# Patient Record
Sex: FEMALE | Race: WHITE | NOT HISPANIC OR LATINO | URBAN - METROPOLITAN AREA
[De-identification: names, ages, dates, MRNs, and addresses within clinical notes are randomized per-mention and may not be internally consistent; named-entity substitution may affect disease eponyms.]

---

## 2023-10-31 ENCOUNTER — TRANSCRIBE ORDERS (OUTPATIENT)
Dept: PHYSICAL THERAPY | Facility: HOSPITAL | Age: 58
End: 2023-10-31

## 2023-10-31 DIAGNOSIS — C50.912 MALIGNANT NEOPLASM OF LEFT FEMALE BREAST, UNSPECIFIED ESTROGEN RECEPTOR STATUS, UNSPECIFIED SITE OF BREAST: Primary | ICD-10-CM

## 2023-11-01 ENCOUNTER — PATIENT OUTREACH (OUTPATIENT)
Dept: OTHER | Facility: HOSPITAL | Age: 58
End: 2023-11-01
Payer: COMMERCIAL

## 2023-11-01 ENCOUNTER — CONSULT (OUTPATIENT)
Dept: ONCOLOGY | Facility: CLINIC | Age: 58
End: 2023-11-01
Payer: COMMERCIAL

## 2023-11-01 VITALS
HEIGHT: 65 IN | WEIGHT: 127 LBS | SYSTOLIC BLOOD PRESSURE: 133 MMHG | TEMPERATURE: 97.5 F | OXYGEN SATURATION: 98 % | RESPIRATION RATE: 18 BRPM | HEART RATE: 98 BPM | BODY MASS INDEX: 21.16 KG/M2 | DIASTOLIC BLOOD PRESSURE: 76 MMHG

## 2023-11-01 DIAGNOSIS — C50.912 MALIGNANT NEOPLASM OF LEFT BREAST IN FEMALE, ESTROGEN RECEPTOR NEGATIVE, UNSPECIFIED SITE OF BREAST: Primary | ICD-10-CM

## 2023-11-01 DIAGNOSIS — I42.7 CARDIOMYOPATHY DUE TO CHEMOTHERAPY: Primary | ICD-10-CM

## 2023-11-01 DIAGNOSIS — C50.912 MALIGNANT NEOPLASM OF LEFT BREAST IN FEMALE, ESTROGEN RECEPTOR NEGATIVE, UNSPECIFIED SITE OF BREAST: ICD-10-CM

## 2023-11-01 DIAGNOSIS — Z17.1 MALIGNANT NEOPLASM OF LEFT BREAST IN FEMALE, ESTROGEN RECEPTOR NEGATIVE, UNSPECIFIED SITE OF BREAST: ICD-10-CM

## 2023-11-01 DIAGNOSIS — T45.1X5A CARDIOMYOPATHY DUE TO CHEMOTHERAPY: Primary | ICD-10-CM

## 2023-11-01 DIAGNOSIS — Z17.1 MALIGNANT NEOPLASM OF LEFT BREAST IN FEMALE, ESTROGEN RECEPTOR NEGATIVE, UNSPECIFIED SITE OF BREAST: Primary | ICD-10-CM

## 2023-11-01 PROBLEM — C50.812 MALIGNANT NEOPLASM OF OVERLAPPING SITES OF LEFT BREAST IN FEMALE, ESTROGEN RECEPTOR NEGATIVE: Status: ACTIVE | Noted: 2023-11-01

## 2023-11-01 NOTE — PROGRESS NOTES
"  Subjective     PROBLEM LIST:  [id number] triple negative (her2 0+) invasive ductal carcinoma of the left breast  Biopsy of a 1.2 cm left breast mass on 10/4/23.  Pathology showed a high grade IDC.    CHIEF COMPLAINT: breast cancer    HISTORY OF PRESENT ILLNESS:  The patient is a 58 y.o. female, referred for evaluation of a recently diagnosed breast cancer.    She presented with a palpable mass in the left breast that she initially thought was a pulled muscle.  She had had a screening mammogram done in July of this year.    She is otherwise in good health.  She lives with her  in [facility].  She works as a school counselor.    REVIEW OF SYSTEMS:  A 14 point review of systems was performed and is negative except as noted above.    No past medical history on file.        Objective     /76   Pulse 98   Temp 97.5 °F (36.4 °C) (Temporal)   Resp 18   Ht 165.1 cm (65\")   Wt 57.6 kg (127 lb)   SpO2 98%   BMI 21.13 kg/m²   Performance Status:0              General: well appearing female in no acute distress  Neuro: alert and oriented  HEENT: sclerae anicteric, oropharynx clear  Lymphatics: no cervical, supraclavicular, or axillary adenopathy  Breast: In the left breast at 3:00 near the chest wall there is a approximately 1 cm palpable movable mass  Extremities: no lower extremity edema  Skin: no rashes, lesions, bruising, or petechiae  Psych: mood and affect appropriate    No results found for: \"WBC\", \"HGB\", \"HCT\", \"MCV\", \"PLT\"  No results found for: \"GLUCOSE\", \"BUN\", \"CREATININE\", \"EGFRIFNONA\", \"EGFRIFAFRI\", \"BCR\", \"K\", \"CO2\", \"CALCIUM\", \"PROTENTOTREF\", \"ALBUMIN\", \"LABIL2\", \"BILIRUBIN\", \"AST\", \"ALT\"    No image results found.            ASSESSMENT AND PLAN:     Swathi Cain is a 58 y.o. female with a clinical stage T1 cN0 M0 triple negative high-grade invasive ductal carcinoma of the left breast.    We discussed neoadjuvant chemotherapy with dose dense Adriamycin and Cytoxan followed by Taxol.   We " reviewed the side effects of this regimen including nausea, fatigue, myelosuppression, infusion reaction, cardiotoxicity, myelodysplasia, neuropathy, alopecia, and constipation or diarrhea.    We discussed that based on the pathologic findings at the time of surgery, that if she still has residual disease we can consider additional treatment such as Xeloda or potentially a clinical trial.    I will order a baseline echocardiogram.    We discussed the clinical trial that is looking at cold compression for the addition of neuropathy.  Our research coordinators will contact her about this.    We will try to start treatment in the next week or 2.    Follow-up with me with cycle 2.             A total greater than 60 mins minutes was spent in face to face patient time, examination, counseling, charting, reviewing test results, and reviewing outside records.    Marga Arreola MD    11/1/2023

## 2023-11-01 NOTE — SIGNIFICANT NOTE
Met patient and her  in consultation with Dr. SMITH. Dr. SMITH reviewed pathology of left breast IG IDC clinical stage IA TNBC and treatment options including chemotherapy before or after surgery. Pt has consult with Dr. Arreola today. MRI ordered. Referral to genetics placed. NN provided education materials and contact info, and encouraged pt to call with any questions. Pt verbalized being overwhelmed with all of the info she received today.     11/01/23 4455   Nurse Navigation   Type of Visit New patient   Location of Visit Cancer Center   Visit Diagnosis Breast - malignant   Referral Source Health professional - outpatient   Treatments Surgery;Chemotherapy   Date of Diagnosis 10/04/23   Chemo - First Consult Appointment 11/01/23   Surgery - First Consult Appointment 11/01/23   Barriers to Care Emotional   Practical Needs Nurse Navigator Referral   Emotional Needs emotional suppport/coping strategies   Physical Needs wigs/caps/cooling cap   Intervention Tasks Performed Education;Symptom management;Cancer prevention/Screening;Outpatient appt;Supportive services referral   Supportive services referral Bioimpedance/Lymphedema;Social Work referral;Genetics referral   Time Navigated Today (Min) 55   Total Time Navigated (Min) 55   Acuity Rating   Time spent with patient 3- greater than 45 minutes   Multimodality treatment coordination and education 3- Complex issues - receiving multiple modalities or concurrent care (chemo, rad, surgery, hospitalization within 30 days)   Caregiver support 1- Family/significant other support available   Distress score 3- 8-10   Coordination of care  - appts 2- Multiple appts   Appt compliance 1- Compliant   ECOG/Karnofsky Score 1- ECOG -Less than or equal to 1,  Karnofsky 90 or greater   PHQ 9 score  1- <10 clinical   Referrals to support services 3- 2 or greater   Acuity score 18   Acuity level Medium acuity

## 2023-11-03 ENCOUNTER — TRANSCRIBE ORDERS (OUTPATIENT)
Dept: GENERAL RADIOLOGY | Facility: HOSPITAL | Age: 58
End: 2023-11-03
Payer: COMMERCIAL

## 2023-11-03 DIAGNOSIS — C50.412 MALIGNANT NEOPLASM OF UPPER-OUTER QUADRANT OF LEFT FEMALE BREAST, UNSPECIFIED ESTROGEN RECEPTOR STATUS: Primary | ICD-10-CM

## 2023-11-06 ENCOUNTER — HOSPITAL ENCOUNTER (OUTPATIENT)
Dept: GENERAL RADIOLOGY | Facility: HOSPITAL | Age: 58
Discharge: HOME | End: 2023-11-06

## 2023-11-06 DIAGNOSIS — C50.412 MALIGNANT NEOPLASM OF UPPER-OUTER QUADRANT OF LEFT FEMALE BREAST, UNSPECIFIED ESTROGEN RECEPTOR STATUS: ICD-10-CM

## 2023-11-06 DIAGNOSIS — Z17.1 MALIGNANT NEOPLASM OF OVERLAPPING SITES OF LEFT BREAST IN FEMALE, ESTROGEN RECEPTOR NEGATIVE: Primary | ICD-10-CM

## 2023-11-06 DIAGNOSIS — C50.812 MALIGNANT NEOPLASM OF OVERLAPPING SITES OF LEFT BREAST IN FEMALE, ESTROGEN RECEPTOR NEGATIVE: Primary | ICD-10-CM

## 2023-11-06 PROCEDURE — 71045 X-RAY EXAM CHEST 1 VIEW: CPT

## 2023-11-06 RX ORDER — CETIRIZINE HYDROCHLORIDE 10 MG/1
10 TABLET ORAL DAILY
COMMUNITY

## 2023-11-06 RX ORDER — TRAZODONE HYDROCHLORIDE 50 MG/1
50 TABLET ORAL NIGHTLY
COMMUNITY

## 2023-11-06 RX ORDER — MULTIPLE VITAMINS W/ MINERALS TAB 9MG-400MCG
1 TAB ORAL DAILY
COMMUNITY

## 2023-11-06 RX ORDER — CHOLECALCIFEROL (VITAMIN D3) 125 MCG
500 CAPSULE ORAL DAILY
COMMUNITY

## 2023-11-06 RX ORDER — ROSUVASTATIN CALCIUM 5 MG/1
5 TABLET, COATED ORAL DAILY
COMMUNITY

## 2023-11-07 ENCOUNTER — RESEARCH ENCOUNTER (OUTPATIENT)
Dept: OTHER | Facility: OTHER | Age: 58
End: 2023-11-07
Payer: COMMERCIAL

## 2023-11-07 ENCOUNTER — EDUCATION (OUTPATIENT)
Dept: ONCOLOGY | Facility: HOSPITAL | Age: 58
End: 2023-11-07
Payer: COMMERCIAL

## 2023-11-07 ENCOUNTER — OFFICE VISIT (OUTPATIENT)
Dept: ONCOLOGY | Facility: CLINIC | Age: 58
End: 2023-11-07
Payer: COMMERCIAL

## 2023-11-07 ENCOUNTER — HOSPITAL ENCOUNTER (OUTPATIENT)
Dept: CARDIOLOGY | Facility: HOSPITAL | Age: 58
Discharge: HOME | End: 2023-11-07
Payer: COMMERCIAL

## 2023-11-07 ENCOUNTER — HOSPITAL ENCOUNTER (OUTPATIENT)
Dept: ONCOLOGY | Facility: HOSPITAL | Age: 58
Discharge: HOME | End: 2023-11-07
Payer: COMMERCIAL

## 2023-11-07 VITALS — HEIGHT: 65 IN | BODY MASS INDEX: 21.16 KG/M2 | WEIGHT: 126.98 LBS

## 2023-11-07 VITALS
WEIGHT: 127 LBS | HEIGHT: 65 IN | SYSTOLIC BLOOD PRESSURE: 155 MMHG | BODY MASS INDEX: 21.16 KG/M2 | TEMPERATURE: 97.9 F | RESPIRATION RATE: 16 BRPM | DIASTOLIC BLOOD PRESSURE: 68 MMHG | OXYGEN SATURATION: 99 % | HEART RATE: 98 BPM

## 2023-11-07 DIAGNOSIS — Z17.1 MALIGNANT NEOPLASM OF OVERLAPPING SITES OF LEFT BREAST IN FEMALE, ESTROGEN RECEPTOR NEGATIVE: ICD-10-CM

## 2023-11-07 DIAGNOSIS — C50.812 MALIGNANT NEOPLASM OF OVERLAPPING SITES OF LEFT BREAST IN FEMALE, ESTROGEN RECEPTOR NEGATIVE: Primary | ICD-10-CM

## 2023-11-07 DIAGNOSIS — Z17.1 MALIGNANT NEOPLASM OF OVERLAPPING SITES OF LEFT BREAST IN FEMALE, ESTROGEN RECEPTOR NEGATIVE: Primary | ICD-10-CM

## 2023-11-07 DIAGNOSIS — L65.8 ALOPECIA DUE TO CYTOTOXIC DRUG: ICD-10-CM

## 2023-11-07 DIAGNOSIS — I42.7 CARDIOMYOPATHY DUE TO CHEMOTHERAPY: ICD-10-CM

## 2023-11-07 DIAGNOSIS — C50.812 MALIGNANT NEOPLASM OF OVERLAPPING SITES OF LEFT BREAST IN FEMALE, ESTROGEN RECEPTOR NEGATIVE: ICD-10-CM

## 2023-11-07 DIAGNOSIS — T45.1X5A ALOPECIA DUE TO CYTOTOXIC DRUG: ICD-10-CM

## 2023-11-07 DIAGNOSIS — T45.1X5A CARDIOMYOPATHY DUE TO CHEMOTHERAPY: ICD-10-CM

## 2023-11-07 LAB
ALBUMIN SERPL-MCNC: 4.5 G/DL (ref 3.5–5.2)
ALBUMIN/GLOB SERPL: 2.3 G/DL
ALP SERPL-CCNC: 74 U/L (ref 39–117)
ALT SERPL W P-5'-P-CCNC: 12 U/L (ref 1–33)
ANION GAP SERPL CALCULATED.3IONS-SCNC: 7 MMOL/L (ref 5–15)
AST SERPL-CCNC: 17 U/L (ref 1–32)
BASOPHILS # BLD AUTO: 0.02 10*3/MM3 (ref 0–0.2)
BASOPHILS NFR BLD AUTO: 0.2 % (ref 0–1.5)
BH CV ECHO LEFT VENTRICLE GLOBAL LONGITUDINAL STRAIN: -20.2 %
BH CV ECHO MEAS - AO MAX PG: 7.1 MMHG
BH CV ECHO MEAS - AO MEAN PG: 4 MMHG
BH CV ECHO MEAS - AO ROOT DIAM: 3 CM
BH CV ECHO MEAS - AO V2 MAX: 133 CM/SEC
BH CV ECHO MEAS - AO V2 VTI: 25.1 CM
BH CV ECHO MEAS - AVA(I,D): 2.13 CM2
BH CV ECHO MEAS - EDV(CUBED): 64 ML
BH CV ECHO MEAS - EDV(MOD-SP2): 54 ML
BH CV ECHO MEAS - EDV(MOD-SP4): 74.6 ML
BH CV ECHO MEAS - EF(MOD-BP): 63.5 %
BH CV ECHO MEAS - EF(MOD-SP2): 61.9 %
BH CV ECHO MEAS - EF(MOD-SP4): 66.6 %
BH CV ECHO MEAS - ESV(CUBED): 13.8 ML
BH CV ECHO MEAS - ESV(MOD-SP2): 20.6 ML
BH CV ECHO MEAS - ESV(MOD-SP4): 24.9 ML
BH CV ECHO MEAS - FS: 40 %
BH CV ECHO MEAS - IVS/LVPW: 0.75 CM
BH CV ECHO MEAS - IVSD: 0.6 CM
BH CV ECHO MEAS - LA DIMENSION: 2.8 CM
BH CV ECHO MEAS - LAT PEAK E' VEL: 12.1 CM/SEC
BH CV ECHO MEAS - LV DIASTOLIC VOL/BSA (35-75): 45.7 CM2
BH CV ECHO MEAS - LV MASS(C)D: 78.4 GRAMS
BH CV ECHO MEAS - LV MAX PG: 3.9 MMHG
BH CV ECHO MEAS - LV MEAN PG: 2 MMHG
BH CV ECHO MEAS - LV SYSTOLIC VOL/BSA (12-30): 15.3 CM2
BH CV ECHO MEAS - LV V1 MAX: 99.2 CM/SEC
BH CV ECHO MEAS - LV V1 VTI: 17 CM
BH CV ECHO MEAS - LVIDD: 4 CM
BH CV ECHO MEAS - LVIDS: 2.4 CM
BH CV ECHO MEAS - LVOT AREA: 3.1 CM2
BH CV ECHO MEAS - LVOT DIAM: 2 CM
BH CV ECHO MEAS - LVPWD: 0.8 CM
BH CV ECHO MEAS - MED PEAK E' VEL: 13.4 CM/SEC
BH CV ECHO MEAS - MV A MAX VEL: 108 CM/SEC
BH CV ECHO MEAS - MV DEC SLOPE: 746 CM/SEC2
BH CV ECHO MEAS - MV DEC TIME: 0.17 SEC
BH CV ECHO MEAS - MV E MAX VEL: 93.4 CM/SEC
BH CV ECHO MEAS - MV E/A: 0.86
BH CV ECHO MEAS - MV MAX PG: 6.1 MMHG
BH CV ECHO MEAS - MV MEAN PG: 3 MMHG
BH CV ECHO MEAS - MV P1/2T: 49.1 MSEC
BH CV ECHO MEAS - MV V2 VTI: 24 CM
BH CV ECHO MEAS - MVA(P1/2T): 4.5 CM2
BH CV ECHO MEAS - MVA(VTI): 2.23 CM2
BH CV ECHO MEAS - PA ACC TIME: 0.12 SEC
BH CV ECHO MEAS - PA V2 MAX: 97.5 CM/SEC
BH CV ECHO MEAS - RAP SYSTOLE: 8 MMHG
BH CV ECHO MEAS - RVSP: 36 MMHG
BH CV ECHO MEAS - SI(MOD-SP2): 20.5 ML/M2
BH CV ECHO MEAS - SI(MOD-SP4): 30.5 ML/M2
BH CV ECHO MEAS - SV(LVOT): 53.4 ML
BH CV ECHO MEAS - SV(MOD-SP2): 33.4 ML
BH CV ECHO MEAS - SV(MOD-SP4): 49.7 ML
BH CV ECHO MEAS - TAPSE (>1.6): 2.21 CM
BH CV ECHO MEAS - TR MAX PG: 28.3 MMHG
BH CV ECHO MEAS - TR MAX VEL: 266 CM/SEC
BH CV ECHO MEASUREMENTS AVERAGE E/E' RATIO: 7.33
BH CV XLRA - RV BASE: 2.7 CM
BH CV XLRA - RV LENGTH: 4.9 CM
BH CV XLRA - RV MID: 2.2 CM
BH CV XLRA - TDI S': 18.6 CM/SEC
BILIRUB SERPL-MCNC: 0.3 MG/DL (ref 0–1.2)
BUN SERPL-MCNC: 13 MG/DL (ref 6–20)
BUN/CREAT SERPL: 19.7 (ref 7–25)
CALCIUM SPEC-SCNC: 9.1 MG/DL (ref 8.6–10.5)
CHLORIDE SERPL-SCNC: 108 MMOL/L (ref 98–107)
CO2 SERPL-SCNC: 26 MMOL/L (ref 22–29)
CREAT SERPL-MCNC: 0.66 MG/DL (ref 0.57–1)
DEPRECATED RDW RBC AUTO: 46.9 FL (ref 37–54)
EGFRCR SERPLBLD CKD-EPI 2021: 103.1 ML/MIN/1.73
EOSINOPHIL # BLD AUTO: 0.03 10*3/MM3 (ref 0–0.4)
EOSINOPHIL NFR BLD AUTO: 0.4 % (ref 0.3–6.2)
ERYTHROCYTE [DISTWIDTH] IN BLOOD BY AUTOMATED COUNT: 12.4 % (ref 12.3–15.4)
GLOBULIN UR ELPH-MCNC: 2 GM/DL
GLUCOSE SERPL-MCNC: 97 MG/DL (ref 65–99)
HCT VFR BLD AUTO: 40.5 % (ref 34–46.6)
HGB BLD-MCNC: 13.5 G/DL (ref 12–15.9)
IMM GRANULOCYTES # BLD AUTO: 0.01 10*3/MM3 (ref 0–0.05)
IMM GRANULOCYTES NFR BLD AUTO: 0.1 % (ref 0–0.5)
LEFT ATRIUM VOLUME INDEX: 16.3 ML/M2
LYMPHOCYTES # BLD AUTO: 2.76 10*3/MM3 (ref 0.7–3.1)
LYMPHOCYTES NFR BLD AUTO: 33.1 % (ref 19.6–45.3)
MCH RBC QN AUTO: 33.6 PG (ref 26.6–33)
MCHC RBC AUTO-ENTMCNC: 33.3 G/DL (ref 31.5–35.7)
MCV RBC AUTO: 100.7 FL (ref 79–97)
MONOCYTES # BLD AUTO: 0.53 10*3/MM3 (ref 0.1–0.9)
MONOCYTES NFR BLD AUTO: 6.3 % (ref 5–12)
NEUTROPHILS NFR BLD AUTO: 5 10*3/MM3 (ref 1.7–7)
NEUTROPHILS NFR BLD AUTO: 59.9 % (ref 42.7–76)
PLATELET # BLD AUTO: 189 10*3/MM3 (ref 140–450)
PMV BLD AUTO: 9 FL (ref 6–12)
POTASSIUM SERPL-SCNC: 3.4 MMOL/L (ref 3.5–5.2)
PROT SERPL-MCNC: 6.5 G/DL (ref 6–8.5)
RBC # BLD AUTO: 4.02 10*6/MM3 (ref 3.77–5.28)
SODIUM SERPL-SCNC: 141 MMOL/L (ref 136–145)
WBC NRBC COR # BLD: 8.35 10*3/MM3 (ref 3.4–10.8)

## 2023-11-07 PROCEDURE — 93356 MYOCRD STRAIN IMG SPCKL TRCK: CPT | Performed by: INTERNAL MEDICINE

## 2023-11-07 PROCEDURE — 36591 DRAW BLOOD OFF VENOUS DEVICE: CPT

## 2023-11-07 PROCEDURE — 80053 COMPREHEN METABOLIC PANEL: CPT | Performed by: INTERNAL MEDICINE

## 2023-11-07 PROCEDURE — 93306 TTE W/DOPPLER COMPLETE: CPT

## 2023-11-07 PROCEDURE — 93356 MYOCRD STRAIN IMG SPCKL TRCK: CPT

## 2023-11-07 PROCEDURE — 93306 TTE W/DOPPLER COMPLETE: CPT | Performed by: INTERNAL MEDICINE

## 2023-11-07 PROCEDURE — 85025 COMPLETE CBC W/AUTO DIFF WBC: CPT | Performed by: INTERNAL MEDICINE

## 2023-11-07 PROCEDURE — 25010000002 HEPARIN LOCK FLUSH PER 10 UNITS: Performed by: INTERNAL MEDICINE

## 2023-11-07 RX ORDER — PALONOSETRON 0.05 MG/ML
0.25 INJECTION, SOLUTION INTRAVENOUS ONCE
Status: CANCELLED | OUTPATIENT
Start: 2023-11-09

## 2023-11-07 RX ORDER — DOXORUBICIN HYDROCHLORIDE 2 MG/ML
60 INJECTION, SOLUTION INTRAVENOUS ONCE
Status: CANCELLED | OUTPATIENT
Start: 2023-11-09

## 2023-11-07 RX ORDER — SODIUM CHLORIDE 9 MG/ML
20 INJECTION, SOLUTION INTRAVENOUS ONCE
Status: CANCELLED | OUTPATIENT
Start: 2023-11-09

## 2023-11-07 RX ORDER — SODIUM CHLORIDE 0.9 % (FLUSH) 0.9 %
10 SYRINGE (ML) INJECTION AS NEEDED
Status: DISCONTINUED | OUTPATIENT
Start: 2023-11-07 | End: 2023-11-08 | Stop reason: HOSPADM

## 2023-11-07 RX ORDER — HEPARIN SODIUM (PORCINE) LOCK FLUSH IV SOLN 100 UNIT/ML 100 UNIT/ML
500 SOLUTION INTRAVENOUS AS NEEDED
Status: CANCELLED | OUTPATIENT
Start: 2023-11-07

## 2023-11-07 RX ORDER — LIDOCAINE AND PRILOCAINE 25; 25 MG/G; MG/G
1 CREAM TOPICAL AS NEEDED
Qty: 30 G | Refills: 3 | Status: SHIPPED | OUTPATIENT
Start: 2023-11-07

## 2023-11-07 RX ORDER — SODIUM CHLORIDE 0.9 % (FLUSH) 0.9 %
10 SYRINGE (ML) INJECTION AS NEEDED
Status: CANCELLED | OUTPATIENT
Start: 2023-11-07

## 2023-11-07 RX ORDER — OLANZAPINE 5 MG/1
5 TABLET ORAL NIGHTLY
Qty: 16 TABLET | Refills: 0 | Status: SHIPPED | OUTPATIENT
Start: 2023-11-07

## 2023-11-07 RX ORDER — ONDANSETRON HYDROCHLORIDE 8 MG/1
8 TABLET, FILM COATED ORAL EVERY 8 HOURS PRN
Qty: 30 TABLET | Refills: 3 | Status: SHIPPED | OUTPATIENT
Start: 2023-11-07

## 2023-11-07 RX ORDER — ONDANSETRON HYDROCHLORIDE 8 MG/1
8 TABLET, FILM COATED ORAL 3 TIMES DAILY PRN
Qty: 30 TABLET | Refills: 5 | Status: SHIPPED | OUTPATIENT
Start: 2023-11-07 | End: 2023-11-07 | Stop reason: SDUPTHER

## 2023-11-07 RX ORDER — OLANZAPINE 5 MG/1
5 TABLET ORAL NIGHTLY
Qty: 4 TABLET | Refills: 3 | Status: SHIPPED | OUTPATIENT
Start: 2023-11-07 | End: 2023-11-07 | Stop reason: SDUPTHER

## 2023-11-07 RX ORDER — HEPARIN SODIUM (PORCINE) LOCK FLUSH IV SOLN 100 UNIT/ML 100 UNIT/ML
500 SOLUTION INTRAVENOUS AS NEEDED
Status: DISCONTINUED | OUTPATIENT
Start: 2023-11-07 | End: 2023-11-08 | Stop reason: HOSPADM

## 2023-11-07 RX ADMIN — HEPARIN 500 UNITS: 100 SYRINGE at 12:20

## 2023-11-07 RX ADMIN — Medication 10 ML: at 12:20

## 2023-11-07 NOTE — PLAN OF CARE
[facility]  [address] [place] [address]  [phone]      CHEMOTHERAPY EDUCATION    NAME:  Swathi Cain      : 1965           DATE: 23    Medication Education Sheets: (select all that apply)  Cyclophosphamide, Doxorubicin, and Paclitaxel + Neulasta OnPro    Other Education Sheets: (select all that apply)  CINV, Diarrhea, Neulasta OnPro Patient Guide, and Symptom Tracker Sheet and KANWAL Information    Chemotherapy Regimen:   Part 1:   OP Breast DD AC DOXOrubicin / Cyclophosphamide  IV on day 1 of every 14-day cycle for 4 cycles  Pegfilgrastim SQ on day 2 of every 14-day cycle for 4 cycles    --followed by--    Part 2:  Paclitaxel IV on day 1 of every 7-day cycle for 12 cycles    TOPICS EDUCATION PROVIDED COMMENTS   ANEMIA:  role of RBC, cause, s/s, ways to manage, role of transfusion [x] Reviewed the role of RBC and the use of transfusions if hemoglobin decreases too much.  Patient to notify us if she experiences shortness of breath, dizziness, or palpitations.  Also let patient know she could feel more tired than usual and to try to stay active, but rest if she needs to.    THROMBOCYTOPENIA:  role of platelet, cause, s/s, ways to prevent bleeding, things to avoid, when to seek help [x] Reviewed the role of platelets in blood clotting and when to call clinic (bloody nose that bleeds for 5 minutes despite pressure, a cut that won't stop bleeding despite pressure, gums that bleed excessively with brushing or flossing). Recommended using a soft bristle toothbrush and blowing the nose gently.    NEUTROPENIA:  role of WBC, cause, infection precautions, s/s of infection, when to call MD [x] Reviewed the role of WBC, good infection prevention practices, and when to call the clinic (temperature 100.4F, sore throat, burning urination, etc)  COVID Vaccines:  COVID vaccine received  Flu Vaccine:  flu vaccine received   NUTRITION & APPETITE CHANGES:  importance  of maintaining healthy diet & weight, ways to manage to improve intake, dietary consult, exercise regimen, electrolyte and/or blood glucose abnormalities [x] Decreased Appetite: Discussed the risk of decreased appetite. Recommended eating smaller, more frequent meals. Instructed the patient to contact the clinic if losing weight or having difficulty eating enough to maintain energy level.   DIARRHEA:  causes, s/s of dehydration, ways to manage, dietary changes, when to call MD [x] Chemotherapy : Discussed the risk of diarrhea. Instructed patient on use OTC loperamide with diarrhea onset, but emphasized the importance of calling the clinic if 4-6 episodes in 24 hours not relieved by OTC loperamide.   NAUSEA & VOMITING:  cause, use of antiemetics, dietary changes, when to call MD [x] Emetic risk:   Part 1: High  Part 2: Low  Premeds:   Part 1: Dexamethasone, Fosaprepitant, and Palonosetron  Part 2: Dexamethasone helps to prevent nausea and infusion reaction  Scheduled home meds:  Part 1: Olanzapine 5 mg by mouth at night on days 1,2,3,4 after chemotherapy to prevent delayed nausea  Part 2: None  PRN home meds: Ondansetron 8mg PO TID PRN N/V  Pharmacy home meds sent to: [facility] in [place]    Instructed the patient to take the scheduled anti-nausea medications even if she does not feel nauseous.  I explained this is to prevent delayed nausea.    Instructed the patient to take a dose of the PRN medication at the first onset of nausea and if it's not working to call us for additional medications.  Also provided non-drug measures to mitigate nausea.   MOUTH SORES:  causes, oral care, ways to manage [x] Mouth sores can be prevented by making a mouth wash mixture of salt, baking soda, and water. The patient was instructed to swish and spit four times daily after meals and before bedtime. Also recommended using a soft bristle toothbrush and avoiding alcohol-containing OTC mouthwashes.    ALOPECIA:  cause,  ways to manage, resources [x] Discussed the possibility of hair loss with the patient. Informed patient that she could request a prescription for a wig if desired and most of the cost is usually covered by insurance. Recommended covering the head with a hat and/or protecting the skin on the head with SPF 30 or higher.    NERVOUS SYSTEM CHANGES:  causes, s/s, neuropathies, cognitive changes, ways to manage [x] Discussed the adverse effect of peripheral neuropathy and signs/symptoms associated with this adverse effect. Instructed patient to call if symptoms become more frequent or worsen.    PAIN:  causes, ways to manage [x] Discussed muscle and joint aches/pains with chemotherapy, and recommended the use of OTC pain relief with ibuprofen or acetaminophen if needed.  Growth Factors: Discussed the possibility for bone pain with pegfilgrastim, and reviewed the use of loratadine 10 mg by mouth at night on days 2-8 of each cycle to help manage this side effect.  Vesicant Pain: Instructed patient to notify the nurse of any pain at the IV site during the infusion.   EMLA Cream: Discussed rx for EMLA cream (sent to [facility] in [place]), and the benefit of use 45-60 minutes prior to access of port for lab draws / infusions.   SKIN & NAIL CHANGES:  cause, s/s, ways to manage [x] Part 2: Informed the patient of the possibility for dark or white lines on finger and toenails during treatment, and that nails may become brittle and even fall off in extreme cases. This will likely reverse after treatment concludes.    ORGAN TOXICITIES:  cause, s/s, need for diagnostic tests, labs, when to notify MD [x] Discussed potential effects on organ systems, monitoring, diagnostic tests, labs, and when to notify the clinic. Discussed the signs/symptoms of the following: cardiotoxicity (part 1) and hepatotoxicity (part 2).   INFUSION RELATED REACTIONS or INJECTION-SITE REACTION:  Cause, s/s, anaphylaxis, monitoring, etc. [x]  Premeds:   Part 1: None  Part 2: Dexamethasone, Diphenhydramine, and Famotidine    Discussed the risk of an infusion reaction and symptoms such as: fever, chills, dizziness, itchiness or rash, flushing, trouble breathing, wheezing, sudden back pain, or feeling faint.  Instructed the patient to notify her nurse if she starts feeling weird at any point during her infusion.    Reviewed how infusion reactions are managed.   SURVIVORSHIP:  distress, distress assessment, secondary malignancies, early/late effects, follow-up, social issues, social support [x] Discussed the rare, but possible risk of secondary malignancies months to years after treatment with cyclophosphamide and doxorubicin, most commonly acute myeloid leukemia.   MISCELLANEOUS:  drug interactions, administration, labs, etc. [x] Discussed chemotherapy schedule, lab draws, infusion times, and total expected visit time.   DDIs:  reviewed the increased possibility of serotonin syndrome with the combined use of Zyprexa, Trazodone, Zoloft, and Zofran. Patient advised to watch for signs of mental status changes, tremor, muscle rigidity, diaphoresis, and hyperthermia.   Drug-food interactions: eating grapefruit and drinking grapefruit juice.  Lab draws: On or before day 1 of each cycle, no sooner than 3 days early.  Reviewed the possibility for hemorrhagic cystitis and emphasized the importance of adequate hydration and frequent voiding of the bladder.  Doxorubicin Red Body Fluids: Discussed the possibility of red body fluids for about 2-3 days after doxorubicin.  Recommend the patient not wear contacts during this time because they could be stained pink.  Patient instructed to contact clinic if urine appears red / pink more than 3 days after receiving doxorubicin.   INFERTILITY & SEXUALITY:    causes, fertility preservation options, sexuality changes, ways to manage, importance of birth control [x] IV Oncology Therapy: Reviewed safe sex practices and the  importance of minimizing exposure to body fluids for 48 hours after each dose of IV oncology therapy. The patient is not of childbearing potential. LMP at age 48.   HOME CARE:  storing of oral chemo, how to manage bodily fluids [x] IV - Counseled on management of soiled linens and proper flush technique.  Discussed how to manage all the side effects at home and advised when to contact the MD office.     Medications:  Prior to Admission medications    Medication Sig Start Date End Date Taking? Authorizing Provider   cetirizine (zyrTEC) 10 MG tablet Take 1 tablet by mouth Daily.    Shani Jefferson MD   multivitamin with minerals (Hair Skin and Nails Formula) tablet tablet Take 1 tablet by mouth Daily.    Shani Jefferson MD   multivitamin with minerals tablet tablet Take 1 tablet by mouth Daily.    Shani Jefferson MD   rosuvastatin (CRESTOR) 5 MG tablet Take 1 tablet by mouth Daily.    Shani Jefferson MD   sertraline (ZOLOFT) 50 MG tablet Take 1 tablet by mouth Daily.    Shani Jefferson MD   traZODone (DESYREL) 50 MG tablet Take 1 tablet by mouth Every Night.    Shani Jefferson MD   vitamin B-12 (CYANOCOBALAMIN) 500 MCG tablet Take 1 tablet by mouth Daily.    Shani Jefferson MD     Notes: All questions and concerns were addressed. Provided a personalized treatment calendar to patient (includes treatment and lab schedule). Provided patient with contact information for the pharmacist and clinic while instructing her to call if any questions or concerns arise. Informed consent for treatment was obtained. Patient and her  were both receptive to information and expressed understanding.     Ann Cowden Mayer, [place], [facility]  Oncology Clinical Pharmacist  Phone [phone]    11/7/2023  10:48 EST

## 2023-11-07 NOTE — PROGRESS NOTES
"CHEMOTHERAPY PREPARATION    Swathi Cain  [id number]  1965    Subjective   Chief Complaint: Treatment Preparation and Needs Assessment    History of present illness:  Swathi Cain is a 58 y.o. year old female who is here today for chemotherapy preparation and needs assessment. The patient has been diagnosed with breast cancer and is scheduled to begin treatment with DOXOrubicin / Cyclophosphamide followed by Taxol.       Oncology History:    Oncology/Hematology History   Malignant neoplasm of overlapping sites of left breast in female, estrogen receptor negative   11/1/2023 Initial Diagnosis    Malignant neoplasm of overlapping sites of left breast in female, estrogen receptor negative     11/7/2023 -  Chemotherapy    OP CENTRAL VENOUS ACCESS DEVICE Access, Care, and Maintenance (CVAD)     11/9/2023 -  Chemotherapy    OP Breast DD AC DOXOrubicin / Cyclophosphamide      1/5/2024 -  Chemotherapy    OP BREAST PACLitaxel  (Weekly X 12)         The current medication list and allergy list were reviewed and reconciled.     Past Medical History, Past Surgical History, Social History, Family History have been reviewed and are without significant changes except as mentioned.    Review of Systems   Constitutional: Negative.    HENT: Negative.     Eyes: Negative.    Respiratory: Negative.     Cardiovascular: Negative.    Gastrointestinal: Negative.    Endocrine: Negative.    Genitourinary: Negative.    Musculoskeletal: Negative.    Skin: Negative.    Allergic/Immunologic: Negative.    Neurological: Negative.    Hematological: Negative.    Psychiatric/Behavioral:  Positive for decreased concentration and sleep disturbance. Negative for confusion and hallucinations. The patient is nervous/anxious.        Objective   Physical Exam  Vital Signs: /68   Pulse 98   Temp 97.9 °F (36.6 °C)   Resp 16   Ht 165.1 cm (65\")   Wt 57.6 kg (127 lb)   SpO2 99%   BMI 21.13 kg/m²   Vitals:    11/07/23 1304   PainSc: 0-No pain "           General Appearance:  alert, cooperative, no apparent distress and appears stated age   Neurologic/Psych: A&O x 3, gait steady, appropriate affect   HEENT:  Normocephalic, without obvious abnormality, mucous membranes moist   Lungs:   Clear to auscultation bilaterally; respirations regular, even, and unlabored bilaterally   Heart:  Regular rate and rhythm, no murmurs appreciated   Extremities: Normal, atraumatic; no clubbing, cyanosis, or edema    Skin: No rashes, lesions, or abnormal coloration noted     ECOG Performance Status: 0 - Asymptomatic            NEEDS ASSESSMENTS    Genetics  The patient's new diagnosis and family history have been reviewed for genetic counseling needs. A genetic referral is recommended.  Referral already placed.  We will follow-up on making sure patient has genetics appointment scheduled.      Psychosocial  The patient has completed a PHQ-9 Depression Screening and the Distress Thermometer (DT) today.   PHQ-9 Total Score: 2 . PHQ-9 results show 1-4 (Minimal Depression). The patient scored their distress today as 8 on a scale of 0-10 with 0 being no distress and 10 being extreme distress.   Problems marked by the patient as being an issue for them within the last week include emotional problems and physical problems.   Results were reviewed along with psychosocial resources offered by our cancer center. Our oncology social worker will be flagged for a DT score of 4 or above, and a same day call will be made for a score of 9 or 10. A mental health referral is recommended at this time. The patient is not accepting of a referral to [facility].   Copies of patient's questionnaires will be scanned into EMR for details and further reference.    Barriers to care  A barriers form was also completed by the patient today. We discussed services offered by our facility to help her have adequate access to care. The patient was given the name and card for our Oncology Social Worker.  "Based upon barriers assessment today, the patient will require a follow-up call from the  to further discuss needs.   A copy of the barriers form will also be scanned into EMR for details and further reference.     VAD Assessment  The patient and I discussed planned intervenous chemotherapy as well as other IV treatments that are often needed throughout the course of treatment. These may include, but are not limited to blood transfusions, antibiotics, and IV hydration. The patient already had a Port-A-Cath insertion prior to initiation of treatment.     Advance Care Planning   ACP discussion was held with the patient during this visit. Patient does not have an advance directive, information provided.  The patient and I discussed advanced care planning, \"Conversations that Matter\".   This service was offered, free of charge, for development of advance directives with a certified ACP facilitator.         Palliative Care  The patient and I discussed palliative care services. Palliative care is not the same as Hospice care. This is specialized medical care for people living with serious illness with the goal of improving quality of life for the patient and their family. [facility] has partnered with [facility] to offer our patients outpatient palliative care early along with their treatment to assist in coordination of care, symptom management, pain management, and medical decision making.  Oncology criteria for palliative care referral is not met at this time. The patient is not interested in a palliative care consultation.     Additional Referral needs  none      CHEMOTHERAPY EDUCATION    Booklets Given: Chemotherapy and You [x]  Eating Hints [x]    Sexuality/Fertility Books []      Chemotherapy/Biotherapy Education Sheets: (list all that apply)  nausea management, acid reflux management, diarrhea management, Cancer resourse contacts information, skin and mouth care, vaccination information, " and wig information                                                                                                                                                                 Chemotherapy Regimen:   Treatment Plans       Name Type Plan Dates Plan Provider         Active    OP Breast DD AC DOXOrubicin / Cyclophosphamide  ONCOLOGY TREATMENT  11/8/2023 - Present Marga Arreola MD                      DETAILED CHEMOTHERAPY TEACHING COMPLETED BY PHARMACY. CHEMOTHERAPY CONSENT COMPLETED BY PHARMACY. SEE PHARMACY EDUCATION FOR DOCUMENTATION.     Chemotherapy education comprehension reviewed. Questions answered and additional information discussed on topics including:  Anemia, Thrombocytopenia, Neutropenia, Nutrition and appetite changes, Constipation, Diarrhea, Nausea & vomiting, Mouth sores, Alopecia, Nervous system changes, Pain, Skin & nail changes, and Organ toxicities        Assessment and Plan:    Diagnoses and all orders for this visit:    1. 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 This time includes time spent by me in the following activities: preparing for the visit, obtaining and/or reviewing a separately obtained history, performing a medically appropriate examination and/or  evaluation, counseling and educating the patient/family/caregiver, ordering medications, tests, or procedures, referring and communicating with other health care professionals, and documenting information in the medical record.     The patient and I have reviewed their new cancer diagnosis and scheduled treatment plan. Needs assessment was completed including genetics, psychosocial needs, barriers to care, VAD evaluation, advanced care planning, and palliative care services. Referrals have been ordered as appropriate based upon our evaluation and patient desires.     Chemotherapy teaching was also completed today as documented above. Adequate time was given to answer all questions to her satisfaction. Patient and family are aware of their care team members and contact information if they have questions or problems throughout the treatment course. Needs assessments and education has been completed. The patient is adequately prepared to begin treatment as scheduled.         Sally Arango, APRN     11/07/23

## 2023-11-07 NOTE — RESEARCH
Patient Name:  Swathi Cain  YOB: 1965  Patient Age:  58 y.o.  Patient's Sex:  female    Date of Service:  11/07/2023    Provider:  ADAM Arreola MD                                       RESEARCH INFORMED CONSENT                                     Protocol:  ASCEND2 / THR-CS-001  Subject ID#:  [id number]    Information (all 8 elements of the ICF) concerning the protocol including, description of procedures to be followed, participant responsibilities, confidentiality, foreseeable risks, compensation, availability, benefits and alternatives to participation, was reviewed with the research participant in an understandable language. The participant was encouraged to ask questions throughout the informed consent process. Any questions and/or concerns were answered to the participant's satisfaction.    After giving the participant time to consider, the participant chose to voluntarily participate in the study. The informed consent document signature process was completed.    Participant authorization for the use and disclosure of protected health information for research purposes (HIPAA Privacy Rule) was acknowledged and signed on the date noted on the consent form.    Contact information for the research department and physician/investigator was provided. A copy of the signed informed consent form was given to the study participant.    No study specific assessments were completed prior to obtaining informed consent.    By signing this document, I attest that I participated in the informed consent discussion with the participant.    Thank you,  Rosio Moise, RN, BSN, CRC

## 2023-11-08 ENCOUNTER — HOSPITAL ENCOUNTER (OUTPATIENT)
Dept: MRI IMAGING | Facility: HOSPITAL | Age: 58
Discharge: HOME | End: 2023-11-08
Admitting: SURGERY
Payer: COMMERCIAL

## 2023-11-08 ENCOUNTER — DOCUMENTATION (OUTPATIENT)
Dept: ONCOLOGY | Facility: CLINIC | Age: 58
End: 2023-11-08
Payer: COMMERCIAL

## 2023-11-08 DIAGNOSIS — C50.412 MALIGNANT NEOPLASM OF UPPER-OUTER QUADRANT OF LEFT FEMALE BREAST, UNSPECIFIED ESTROGEN RECEPTOR STATUS: ICD-10-CM

## 2023-11-08 PROCEDURE — 77049 MRI BREAST C-+ W/CAD BI: CPT

## 2023-11-08 PROCEDURE — 0 GADOBENATE DIMEGLUMINE 529 MG/ML SOLUTION: Performed by: SURGERY

## 2023-11-08 PROCEDURE — A9577 INJ MULTIHANCE: HCPCS | Performed by: SURGERY

## 2023-11-08 RX ADMIN — GADOBENATE DIMEGLUMINE 11 ML: 529 INJECTION, SOLUTION INTRAVENOUS at 15:39

## 2023-11-08 NOTE — PROGRESS NOTES
Distress Screening Follow-up    Name: Swathi Cain    : 1965    Diagnosis: Malignant neoplasm of overlapping sites of left breast in female, estrogen receptor negative     Location of Distress Screening: Needs Assessment  and Breast Surgery     Distress Level: 8 (2023  1:00 PM)    Physical Concerns:  Sleep: Y  Substance abuse: N  Fatigue: N  Tobacco use: N  Memory or concentration: Y  Sexual health: N  Changes in eating: N  Loss or change of physical abilities: N  Pain: N      Emotional Concerns:  Worry or anxiety: Y  Sadness or depression: N  Loss of interest or enjoyment: N  Grief or loss: N  Fear: N  Loneliness: N  Anger: N  Changes in appearance: N  Feelings of worthlessness or being a burden: N    Social Concerns:  Relationship with spouse or partner: N  Relationship with children: N  Relationship with family members: N  Relationship with friends or coworkers: N  Communication with health care team: N  Ability to have children: N    Practical Concerns:  Taking care of myself: N  Taking care of others: N  Work: N  School: N  Housing: N  Finances: N  Insurance: N  Transportation: N  : N  Having enough food: N  Access to medicine: N  Treatment decisions: N      Spiritual Concerns:  Sense of meaning or purpose: N  Changes in senait or beliefs: N  Death, dying or afterlife: N  Conflict between beliefs and cancer treatments: N  Relationship with the sacred: N  Ritual or dietary needs: N       Interventions: n/a      Comments:  LENNOX contacted pt to follow up on Distress Screen from recent visit. LENNOX provided introductions and explained nature of the call. Pt communicated she is doing well at this time, and denied any needs. LENNOX educated on role and services, and provided information on [facility] should she like a referral in the future. Pt was familiar with [facility] and thanked LENNOX for the information. LENNOX provided contact information and will be available ongoing.

## 2023-11-09 ENCOUNTER — HOSPITAL ENCOUNTER (OUTPATIENT)
Dept: ONCOLOGY | Facility: HOSPITAL | Age: 58
Discharge: HOME | End: 2023-11-09
Admitting: INTERNAL MEDICINE
Payer: COMMERCIAL

## 2023-11-09 ENCOUNTER — DOCUMENTATION (OUTPATIENT)
Dept: NUTRITION | Facility: HOSPITAL | Age: 58
End: 2023-11-09
Payer: COMMERCIAL

## 2023-11-09 VITALS
BODY MASS INDEX: 20.83 KG/M2 | WEIGHT: 125 LBS | DIASTOLIC BLOOD PRESSURE: 91 MMHG | HEIGHT: 65 IN | RESPIRATION RATE: 16 BRPM | TEMPERATURE: 98 F | SYSTOLIC BLOOD PRESSURE: 170 MMHG | HEART RATE: 101 BPM

## 2023-11-09 DIAGNOSIS — Z17.1 MALIGNANT NEOPLASM OF OVERLAPPING SITES OF LEFT BREAST IN FEMALE, ESTROGEN RECEPTOR NEGATIVE: Primary | ICD-10-CM

## 2023-11-09 DIAGNOSIS — C50.812 MALIGNANT NEOPLASM OF OVERLAPPING SITES OF LEFT BREAST IN FEMALE, ESTROGEN RECEPTOR NEGATIVE: Primary | ICD-10-CM

## 2023-11-09 PROCEDURE — 25810000003 SODIUM CHLORIDE 0.9 % SOLUTION: Performed by: NURSE PRACTITIONER

## 2023-11-09 PROCEDURE — 25810000003 SODIUM CHLORIDE 0.9 % SOLUTION 250 ML FLEX CONT: Performed by: NURSE PRACTITIONER

## 2023-11-09 PROCEDURE — 96375 TX/PRO/DX INJ NEW DRUG ADDON: CPT

## 2023-11-09 PROCEDURE — 25010000002 FOSAPREPITANT PER 1 MG: Performed by: NURSE PRACTITIONER

## 2023-11-09 PROCEDURE — 25010000002 DOXORUBICIN PER 10 MG: Performed by: NURSE PRACTITIONER

## 2023-11-09 PROCEDURE — 96367 TX/PROPH/DG ADDL SEQ IV INF: CPT

## 2023-11-09 PROCEDURE — 96411 CHEMO IV PUSH ADDL DRUG: CPT

## 2023-11-09 PROCEDURE — 25010000002 PALONOSETRON PER 25 MCG: Performed by: NURSE PRACTITIONER

## 2023-11-09 PROCEDURE — 25010000002 PEGFILGRASTIM 6 MG/0.6ML PREFILLED SYRINGE KIT: Performed by: NURSE PRACTITIONER

## 2023-11-09 PROCEDURE — 25010000002 HEPARIN LOCK FLUSH PER 10 UNITS: Performed by: INTERNAL MEDICINE

## 2023-11-09 PROCEDURE — 96413 CHEMO IV INFUSION 1 HR: CPT

## 2023-11-09 PROCEDURE — 25010000002 CYCLOPHOSPHAMIDE 2 GM/10ML SOLUTION 10 ML VIAL: Performed by: NURSE PRACTITIONER

## 2023-11-09 PROCEDURE — 96377 APPLICATON ON-BODY INJECTOR: CPT

## 2023-11-09 PROCEDURE — 25010000002 DEXAMETHASONE SODIUM PHOSPHATE 100 MG/10ML SOLUTION: Performed by: NURSE PRACTITIONER

## 2023-11-09 RX ORDER — HEPARIN SODIUM (PORCINE) LOCK FLUSH IV SOLN 100 UNIT/ML 100 UNIT/ML
500 SOLUTION INTRAVENOUS AS NEEDED
Status: DISCONTINUED | OUTPATIENT
Start: 2023-11-09 | End: 2023-11-10 | Stop reason: HOSPADM

## 2023-11-09 RX ORDER — HEPARIN SODIUM (PORCINE) LOCK FLUSH IV SOLN 100 UNIT/ML 100 UNIT/ML
500 SOLUTION INTRAVENOUS AS NEEDED
OUTPATIENT
Start: 2023-11-09

## 2023-11-09 RX ORDER — SODIUM CHLORIDE 0.9 % (FLUSH) 0.9 %
10 SYRINGE (ML) INJECTION AS NEEDED
OUTPATIENT
Start: 2023-11-09

## 2023-11-09 RX ORDER — PALONOSETRON 0.05 MG/ML
0.25 INJECTION, SOLUTION INTRAVENOUS ONCE
Status: COMPLETED | OUTPATIENT
Start: 2023-11-09 | End: 2023-11-09

## 2023-11-09 RX ORDER — SODIUM CHLORIDE 9 MG/ML
20 INJECTION, SOLUTION INTRAVENOUS ONCE
Status: COMPLETED | OUTPATIENT
Start: 2023-11-09 | End: 2023-11-09

## 2023-11-09 RX ORDER — DOXORUBICIN HYDROCHLORIDE 2 MG/ML
60 INJECTION, SOLUTION INTRAVENOUS ONCE
Status: COMPLETED | OUTPATIENT
Start: 2023-11-09 | End: 2023-11-09

## 2023-11-09 RX ADMIN — DOXORUBICIN HYDROCHLORIDE 49 MG: 2 INJECTION, SOLUTION INTRAVENOUS at 12:50

## 2023-11-09 RX ADMIN — DEXAMETHASONE SODIUM PHOSPHATE 12 MG: 10 INJECTION, SOLUTION INTRAMUSCULAR; INTRAVENOUS at 11:39

## 2023-11-09 RX ADMIN — PALONOSETRON HYDROCHLORIDE 0.25 MG: 0.25 INJECTION INTRAVENOUS at 11:37

## 2023-11-09 RX ADMIN — HEPARIN 500 UNITS: 100 SYRINGE at 13:47

## 2023-11-09 RX ADMIN — SODIUM CHLORIDE 20 ML/HR: 9 INJECTION, SOLUTION INTRAVENOUS at 11:30

## 2023-11-09 RX ADMIN — CYCLOPHOSPHAMIDE 980 MG: 200 INJECTION, SOLUTION INTRAVENOUS at 13:01

## 2023-11-09 RX ADMIN — PEGFILGRASTIM 6 MG: KIT SUBCUTANEOUS at 13:48

## 2023-11-09 RX ADMIN — FOSAPREPITANT 150 MG: 150 INJECTION, POWDER, LYOPHILIZED, FOR SOLUTION INTRAVENOUS at 11:39

## 2023-11-09 NOTE — ADDENDUM NOTE
Encounter addended by: Luz Elena Kinsey RN on: 11/9/2023 2:37 PM   Actions taken: Flowsheet accepted

## 2023-11-09 NOTE — PROGRESS NOTES
"Outpatient Oncology Nutrition     Reason for Visit:  Oncology Nutrition Screening and Patient Education / Met with patient during her initial chemotherapy infusion appointment.     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    Goal: 64 ounces     Enteral Feeding       Anthropometric Measurements     Height:    Ht Readings from Last 1 Encounters:   23 165.1 cm (65\")       Weight:    Wt Readings from Last 1 Encounters:   23 56.7 kg (125 lb)       BMI: 20.8 - Normal  Usual Body Weight: ~130#   Weight Change:  ~5# (3.8%) x 5 weeks     Review of Lab Data (Time Frame - 1 month / 2 month)   Labs reviewed - 23    Medication Review   MAR reviewed - MVI and Vitamin B12 noted     Nutrition Focused Physical Findings       Nutrition Impact Symptoms   Altered appetite due to nerves since diagnosis but states she is still eating well    Physical Activity   Normal with no limitations    Current Nutritional Intake     Oral diet:  Regular     Oral nutritional supplements: Arbonne protein drink     Intake: oral intake has been normal     Malnutrition Risk Assessment     Recent weight loss over the past 6 months:  Yes    How much weight loss:  1 = 2-13 lbs    Eating poorly because of a decreased appetite:  0 " "= No    Malnutrition Screening Score:     MST = 0 or 1 Patient not at risk for malnutrition    Nutrition Diagnosis     Problem    Etiology    Signs / Symptoms      Nutrition Intervention   Discussed the importance of good nutrition during her treatment course focusing on adequate calorie, protein, nutrient and fluid intake.  Advised her to be consuming smaller more frequent meals/snacks throughout the day to aid with potential nausea management.  Emphasized the importance of protein and its role in the diet; reviewed high protein foods; and recommended she have a protein source at each meal/snack.  Discussed ONS and their role in the diet.  Advised her to drink ONS as needed to aid with calorie / protein intake.  Provided and reviewed written diet material \"Nutritional Considerations in Breast Cancer\" and discussed the basics of survivorship nutrition.    Goal   To achieve adequate nutritional and hydration intake.  To aid with nutrition impact symptom management as needed.     Monitoring / Evaluation   Answered her questions and she voiced understanding of information discussed.  RD's contact information provided and encouraged to call with questions.  Will follow up as indicated.     Vandana Chow MS, RD, LD   "

## 2023-11-13 ENCOUNTER — TELEPHONE (OUTPATIENT)
Dept: MRI IMAGING | Facility: HOSPITAL | Age: 58
End: 2023-11-13
Payer: COMMERCIAL

## 2023-11-16 ENCOUNTER — CLINICAL SUPPORT (OUTPATIENT)
Dept: GENETICS | Facility: HOSPITAL | Age: 58
End: 2023-11-16
Payer: COMMERCIAL

## 2023-11-16 DIAGNOSIS — Z13.79 GENETIC TESTING: Primary | ICD-10-CM

## 2023-11-16 DIAGNOSIS — Z17.1 MALIGNANT NEOPLASM OF OVERLAPPING SITES OF LEFT BREAST IN FEMALE, ESTROGEN RECEPTOR NEGATIVE: ICD-10-CM

## 2023-11-16 DIAGNOSIS — C50.812 MALIGNANT NEOPLASM OF OVERLAPPING SITES OF LEFT BREAST IN FEMALE, ESTROGEN RECEPTOR NEGATIVE: ICD-10-CM

## 2023-11-16 NOTE — PROGRESS NOTES
Swathi Cain, a 58-year-old female, was seen for genetic counseling due to a personal history of breast cancer. Ms. Cain was recently diagnosed with a left-sided triple negative breast cancer at age 58. She is currently undergoing chemotherapy. Ms. Cain retains her uterus and ovaries. She had a colonoscopy five years ago and reports no history polyps. She was interested in discussing her risk for a hereditary cancer syndrome.  Ms. Cain was interested in pursuing a multi-gene panel to evaluate her risk of cancer; therefore, the Hereditary Breast Cancer Guidelines-Based Panel was ordered through [employer] which analyzes BRCA1/2 and 11 additional genes associated with an increased cancer risk. Results are expected in 2-3 weeks.    PERTINENT FAMILY HISTORY: (See attached pedigree)   Mother:  Skin cancer  Mat Cousin 1:  Tongue cancer  Mat Cousin 2:  Leukemia    Records regarding the family history were not available for review.     RISK ASSESSMENT:  Ms. Cain's personal history of triple negative breast cancer led to concern for a hereditary cancer syndrome. We discussed BRCA1/2 testing as well as the option of pursuing a panel that would test for other genes known to impact cancer risk in addition to BRCA1/2. She meets NCCN criteria for BRCA1/2 testing based on her personal history of triple negative breast cancer. These risk assessments are based on the family history information provided at the time of the appointment and could change in the future should new information be obtained.    GENETIC COUNSELING: We reviewed the family history information in detail. Cases of cancer follow three general patterns: sporadic, familial, and hereditary. While most cancer is sporadic, some cases appear to occur in family clusters. These cases are said to be familial and account for 10-20% of cancer cases. Familial cases may be due to a combination of shared genes and environmental factors among family members.  In even  fewer families, the cancer is said to be inherited, and the genes responsible for the cancer are known.      Family histories typical of hereditary cancer syndromes usually include multiple first- and second-degree relatives diagnosed with cancer types that define a syndrome. These cases tend to be diagnosed at younger-than-expected ages and can be bilateral or multifocal. The cancer in these families follows an autosomal dominant inheritance pattern, which indicates the likely presence of a mutation in a cancer susceptibility gene. Children and siblings of an individual believed to carry this mutation have a 50% chance of inheriting that mutation, thereby inheriting the increased risk to develop cancer. These mutations can be passed down from the maternal or the paternal lineage.    Hereditary breast cancer accounts for 5-10% of all cases of breast cancer. A significant proportion of hereditary breast and ovarian cancer can be attributed to mutations in the BRCA1 and BRCA2 genes. Mutations in these genes confer an increased risk for breast cancer, ovarian cancer, male breast cancer, prostate cancer, and pancreatic cancer. Women with a BRCA1 or BRCA2 mutation who have already been diagnosed with breast cancer have a 40-60% lifetime risk of a second breast cancer. Women with a BRCA1 or BRCA2 mutation have up to a 56% risk of ovarian cancer. BRCA1 has been more significantly associated with triple negative breast cancers than other genes.      There are other genes that are known to be associated with an increased risk for cancer. Some of these genes have well defined cancer risks and established management guidelines. Other genes that can be tested for have been more recently described, and there may be less data regarding the risks and therefore may not have established management guidelines. We reviewed that in some cases, the identification of a genetic mutation may impact treatment options for some types of  cancer. We discussed these limitations at length. Based on Ms. Cain's desire to get as much information as possible regarding her personal risks and potential risks for her family, she opted to pursue testing through a panel that would look at several other genes known to increase the risk for cancer.    GENETIC TESTING:  The risks, benefits and limitations of genetic testing and implications for clinical management following testing were reviewed. DNA test results can influence decisions regarding screening, prevention and surgical management. Genetic testing can have significant psychological implications for both individuals and families. Also discussed was the possibility of employment and insurance discrimination based on genetic test results and the laws in place to prevent this ([place]).    We discussed panel testing, which would involve testing for BRCA1/2 as well as 11 additional genes that are associated with increased cancer risk. The benefits and limitations of genetic testing were discussed, and Ms. Cain decided to pursue testing via the panel. The implications of a positive or negative test result were discussed. We discussed the possibility that, in some cases, genetic test results may be informative or may be ambiguous due to the identification of a genetic variant. These variants may or may not be associated with an increased cancer risk. With multigene panel testing, it is not uncommon for a variant of uncertain significance (VUS) to be identified. If a VUS is identified, testing family members is typically not recommended and screening recommendations are made based on the family history. The laboratories that perform genetic testing work to reclassify the VUS and send out an amended report if and when a VUS is reclassified. The majority of variant findings are ultimately reclassified to a negative result. Given her personal and family history, a negative test result would not eliminate all cancer  risk to her relatives, although the risk would not be as high as it would with positive genetic testing.      PLAN: Genetic testing via the Hereditary Breast Cancer Guidelines-Based Panel through [employer] was ordered. She plans to have her blood drawn at [facility] on 11/22. Results are expected in 2-3 weeks. Ms. Cain is welcome to contact us in the meantime with any questions she may have at [phone].      Freya Bailey MS, [facility], [facility]  Licensed Certified Genetic Counselor

## 2023-11-17 DIAGNOSIS — C50.812 MALIGNANT NEOPLASM OF OVERLAPPING SITES OF LEFT BREAST IN FEMALE, ESTROGEN RECEPTOR NEGATIVE: Primary | ICD-10-CM

## 2023-11-17 DIAGNOSIS — Z17.1 MALIGNANT NEOPLASM OF OVERLAPPING SITES OF LEFT BREAST IN FEMALE, ESTROGEN RECEPTOR NEGATIVE: Primary | ICD-10-CM

## 2023-11-22 ENCOUNTER — HOSPITAL ENCOUNTER (OUTPATIENT)
Dept: ONCOLOGY | Facility: HOSPITAL | Age: 58
Discharge: HOME | End: 2023-11-22
Admitting: INTERNAL MEDICINE
Payer: COMMERCIAL

## 2023-11-22 ENCOUNTER — OFFICE VISIT (OUTPATIENT)
Dept: ONCOLOGY | Facility: CLINIC | Age: 58
End: 2023-11-22
Payer: COMMERCIAL

## 2023-11-22 VITALS
HEIGHT: 65 IN | WEIGHT: 125 LBS | DIASTOLIC BLOOD PRESSURE: 73 MMHG | TEMPERATURE: 97.3 F | BODY MASS INDEX: 20.83 KG/M2 | OXYGEN SATURATION: 99 % | SYSTOLIC BLOOD PRESSURE: 142 MMHG | HEART RATE: 131 BPM | RESPIRATION RATE: 16 BRPM

## 2023-11-22 VITALS — HEART RATE: 121 BPM

## 2023-11-22 DIAGNOSIS — C50.812 MALIGNANT NEOPLASM OF OVERLAPPING SITES OF LEFT BREAST IN FEMALE, ESTROGEN RECEPTOR NEGATIVE: Primary | ICD-10-CM

## 2023-11-22 DIAGNOSIS — Z17.1 MALIGNANT NEOPLASM OF OVERLAPPING SITES OF LEFT BREAST IN FEMALE, ESTROGEN RECEPTOR NEGATIVE: Primary | ICD-10-CM

## 2023-11-22 LAB
ALBUMIN SERPL-MCNC: 4 G/DL (ref 3.5–5.2)
ALBUMIN/GLOB SERPL: 1.6 G/DL
ALP SERPL-CCNC: 97 U/L (ref 39–117)
ALT SERPL W P-5'-P-CCNC: 15 U/L (ref 1–33)
ANION GAP SERPL CALCULATED.3IONS-SCNC: 9 MMOL/L (ref 5–15)
AST SERPL-CCNC: 18 U/L (ref 1–32)
BASOPHILS # BLD AUTO: 0.04 10*3/MM3 (ref 0–0.2)
BASOPHILS NFR BLD AUTO: 0.6 % (ref 0–1.5)
BILIRUB SERPL-MCNC: <0.2 MG/DL (ref 0–1.2)
BUN SERPL-MCNC: 11 MG/DL (ref 6–20)
BUN/CREAT SERPL: 15.1 (ref 7–25)
CALCIUM SPEC-SCNC: 8.6 MG/DL (ref 8.6–10.5)
CHLORIDE SERPL-SCNC: 104 MMOL/L (ref 98–107)
CO2 SERPL-SCNC: 24 MMOL/L (ref 22–29)
CREAT SERPL-MCNC: 0.73 MG/DL (ref 0.57–1)
DEPRECATED RDW RBC AUTO: 46.4 FL (ref 37–54)
EGFRCR SERPLBLD CKD-EPI 2021: 96.7 ML/MIN/1.73
EOSINOPHIL # BLD AUTO: 0.02 10*3/MM3 (ref 0–0.4)
EOSINOPHIL NFR BLD AUTO: 0.3 % (ref 0.3–6.2)
ERYTHROCYTE [DISTWIDTH] IN BLOOD BY AUTOMATED COUNT: 12.3 % (ref 12.3–15.4)
GLOBULIN UR ELPH-MCNC: 2.5 GM/DL
GLUCOSE SERPL-MCNC: 115 MG/DL (ref 65–99)
HCT VFR BLD AUTO: 34.5 % (ref 34–46.6)
HGB BLD-MCNC: 11.4 G/DL (ref 12–15.9)
IMM GRANULOCYTES # BLD AUTO: 0.23 10*3/MM3 (ref 0–0.05)
IMM GRANULOCYTES NFR BLD AUTO: 3.2 % (ref 0–0.5)
LYMPHOCYTES # BLD AUTO: 1.46 10*3/MM3 (ref 0.7–3.1)
LYMPHOCYTES NFR BLD AUTO: 20.4 % (ref 19.6–45.3)
MCH RBC QN AUTO: 33.8 PG (ref 26.6–33)
MCHC RBC AUTO-ENTMCNC: 33 G/DL (ref 31.5–35.7)
MCV RBC AUTO: 102.4 FL (ref 79–97)
MONOCYTES # BLD AUTO: 1.04 10*3/MM3 (ref 0.1–0.9)
MONOCYTES NFR BLD AUTO: 14.5 % (ref 5–12)
NEUTROPHILS NFR BLD AUTO: 4.37 10*3/MM3 (ref 1.7–7)
NEUTROPHILS NFR BLD AUTO: 61 % (ref 42.7–76)
PLATELET # BLD AUTO: 229 10*3/MM3 (ref 140–450)
PMV BLD AUTO: 9.2 FL (ref 6–12)
POTASSIUM SERPL-SCNC: 3.5 MMOL/L (ref 3.5–5.2)
PROT SERPL-MCNC: 6.5 G/DL (ref 6–8.5)
RBC # BLD AUTO: 3.37 10*6/MM3 (ref 3.77–5.28)
SODIUM SERPL-SCNC: 137 MMOL/L (ref 136–145)
WBC NRBC COR # BLD AUTO: 7.16 10*3/MM3 (ref 3.4–10.8)

## 2023-11-22 PROCEDURE — 96375 TX/PRO/DX INJ NEW DRUG ADDON: CPT

## 2023-11-22 PROCEDURE — 96377 APPLICATON ON-BODY INJECTOR: CPT

## 2023-11-22 PROCEDURE — 85025 COMPLETE CBC W/AUTO DIFF WBC: CPT | Performed by: INTERNAL MEDICINE

## 2023-11-22 PROCEDURE — 25010000002 PALONOSETRON PER 25 MCG: Performed by: NURSE PRACTITIONER

## 2023-11-22 PROCEDURE — 99214 OFFICE O/P EST MOD 30 MIN: CPT | Performed by: INTERNAL MEDICINE

## 2023-11-22 PROCEDURE — 96367 TX/PROPH/DG ADDL SEQ IV INF: CPT

## 2023-11-22 PROCEDURE — 25010000002 CYCLOPHOSPHAMIDE 2 GM/10ML SOLUTION 10 ML VIAL: Performed by: NURSE PRACTITIONER

## 2023-11-22 PROCEDURE — 96413 CHEMO IV INFUSION 1 HR: CPT

## 2023-11-22 PROCEDURE — 25010000002 FOSAPREPITANT PER 1 MG: Performed by: NURSE PRACTITIONER

## 2023-11-22 PROCEDURE — 25010000002 HEPARIN LOCK FLUSH PER 10 UNITS: Performed by: INTERNAL MEDICINE

## 2023-11-22 PROCEDURE — 80053 COMPREHEN METABOLIC PANEL: CPT | Performed by: INTERNAL MEDICINE

## 2023-11-22 PROCEDURE — 25810000003 SODIUM CHLORIDE 0.9 % SOLUTION: Performed by: NURSE PRACTITIONER

## 2023-11-22 PROCEDURE — 25010000002 DEXAMETHASONE SODIUM PHOSPHATE 100 MG/10ML SOLUTION: Performed by: NURSE PRACTITIONER

## 2023-11-22 PROCEDURE — 25810000003 SODIUM CHLORIDE 0.9 % SOLUTION 250 ML FLEX CONT: Performed by: NURSE PRACTITIONER

## 2023-11-22 PROCEDURE — 25010000002 PEGFILGRASTIM 6 MG/0.6ML PREFILLED SYRINGE KIT: Performed by: NURSE PRACTITIONER

## 2023-11-22 PROCEDURE — 25010000002 DOXORUBICIN PER 10 MG: Performed by: NURSE PRACTITIONER

## 2023-11-22 PROCEDURE — 96411 CHEMO IV PUSH ADDL DRUG: CPT

## 2023-11-22 RX ORDER — SODIUM CHLORIDE 0.9 % (FLUSH) 0.9 %
10 SYRINGE (ML) INJECTION AS NEEDED
OUTPATIENT
Start: 2023-11-22

## 2023-11-22 RX ORDER — PALONOSETRON 0.05 MG/ML
0.25 INJECTION, SOLUTION INTRAVENOUS ONCE
Status: CANCELLED | OUTPATIENT
Start: 2023-11-22

## 2023-11-22 RX ORDER — PALONOSETRON 0.05 MG/ML
0.25 INJECTION, SOLUTION INTRAVENOUS ONCE
Status: COMPLETED | OUTPATIENT
Start: 2023-11-22 | End: 2023-11-22

## 2023-11-22 RX ORDER — HEPARIN SODIUM (PORCINE) LOCK FLUSH IV SOLN 100 UNIT/ML 100 UNIT/ML
500 SOLUTION INTRAVENOUS AS NEEDED
OUTPATIENT
Start: 2023-11-22

## 2023-11-22 RX ORDER — DOXORUBICIN HYDROCHLORIDE 2 MG/ML
60 INJECTION, SOLUTION INTRAVENOUS ONCE
Status: COMPLETED | OUTPATIENT
Start: 2023-11-22 | End: 2023-11-22

## 2023-11-22 RX ORDER — DOXORUBICIN HYDROCHLORIDE 2 MG/ML
60 INJECTION, SOLUTION INTRAVENOUS ONCE
Status: CANCELLED | OUTPATIENT
Start: 2023-11-22

## 2023-11-22 RX ORDER — SODIUM CHLORIDE 9 MG/ML
20 INJECTION, SOLUTION INTRAVENOUS ONCE
Status: COMPLETED | OUTPATIENT
Start: 2023-11-22 | End: 2023-11-22

## 2023-11-22 RX ORDER — SODIUM CHLORIDE 0.9 % (FLUSH) 0.9 %
10 SYRINGE (ML) INJECTION AS NEEDED
Status: DISCONTINUED | OUTPATIENT
Start: 2023-11-22 | End: 2023-11-23 | Stop reason: HOSPADM

## 2023-11-22 RX ORDER — SODIUM CHLORIDE 9 MG/ML
20 INJECTION, SOLUTION INTRAVENOUS ONCE
Status: CANCELLED | OUTPATIENT
Start: 2023-11-22

## 2023-11-22 RX ORDER — HEPARIN SODIUM (PORCINE) LOCK FLUSH IV SOLN 100 UNIT/ML 100 UNIT/ML
500 SOLUTION INTRAVENOUS AS NEEDED
Status: DISCONTINUED | OUTPATIENT
Start: 2023-11-22 | End: 2023-11-23 | Stop reason: HOSPADM

## 2023-11-22 RX ADMIN — Medication 10 ML: at 15:04

## 2023-11-22 RX ADMIN — DOXORUBICIN HYDROCHLORIDE 49 MG: 2 INJECTION, SOLUTION INTRAVENOUS at 14:02

## 2023-11-22 RX ADMIN — CYCLOPHOSPHAMIDE 980 MG: 200 INJECTION, SOLUTION INTRAVENOUS at 14:28

## 2023-11-22 RX ADMIN — PEGFILGRASTIM 6 MG: KIT SUBCUTANEOUS at 15:11

## 2023-11-22 RX ADMIN — PALONOSETRON HYDROCHLORIDE 0.25 MG: 0.25 INJECTION, SOLUTION INTRAVENOUS at 13:07

## 2023-11-22 RX ADMIN — SODIUM CHLORIDE 150 MG: 9 INJECTION, SOLUTION INTRAVENOUS at 13:11

## 2023-11-22 RX ADMIN — DEXAMETHASONE SODIUM PHOSPHATE 12 MG: 10 INJECTION, SOLUTION INTRAMUSCULAR; INTRAVENOUS at 13:08

## 2023-11-22 RX ADMIN — SODIUM CHLORIDE 20 ML/HR: 9 INJECTION, SOLUTION INTRAVENOUS at 13:04

## 2023-11-22 RX ADMIN — HEPARIN 500 UNITS: 100 SYRINGE at 15:04

## 2023-11-22 NOTE — PROGRESS NOTES
"      PROBLEM LIST:  [id number] triple negative (her2 0+) invasive ductal carcinoma of the left breast  Biopsy of a 1.2 cm left breast mass on 10/4/23.  Pathology showed a high grade IDC.  Neoadjuvant chemotherapy with ddAC followed by taxol started 11/9/23    Subjective     CHIEF COMPLAINT: breast cancer    HISTORY OF PRESENT ILLNESS:   Swathi Cain returns for follow-up.   She had her first dose of chemotherapy.  Overall she says it was not bad.  She did not have any nausea.  She has noticed that the breast mass seems a little bit smaller.  She started having hair loss this morning.      Objective      /73   Pulse (!) 131   Temp 97.3 °F (36.3 °C) (Infrared)   Resp 16   Ht 165.1 cm (65\")   Wt 56.7 kg (125 lb)   SpO2 99%   BMI 20.80 kg/m²      Performance Status:  ECOG score: 0             General: well appearing female in no acute distress  Neuro: alert and oriented  HEENT: sclera anicteric, oropharynx clear  Breast: In the left breast at 3:00 there is a movable approximately 8 mm mass, softer compared to previous exam.  Cardiovascular: regular rate and rhythm, no murmurs  Lungs: clear to auscultation bilaterally  Abdomen: soft, nontender, nondistended.  No palpable organomegaly  Extremeties: no lower extremity edema  Skin: no rashes, lesions, bruising, or petechiae  Psych: mood and affect appropriate            RECENT LABS:  Lab Results   Component Value Date    WBC 7.16 11/22/2023    HGB 11.4 (L) 11/22/2023    HCT 34.5 11/22/2023    .4 (H) 11/22/2023     11/22/2023       Lab Results   Component Value Date    GLUCOSE 97 11/07/2023    BUN 13 11/07/2023    CREATININE 0.66 11/07/2023    BCR 19.7 11/07/2023    K 3.4 (L) 11/07/2023    CO2 26.0 11/07/2023    CALCIUM 9.1 11/07/2023    ALBUMIN 4.5 11/07/2023    AST 17 11/07/2023    ALT 12 11/07/2023       MRI Breast Bilateral Diagnostic With & Without Contrast  Narrative: BILATERAL BREAST MRI WITH CONTRAST     CLINICAL HISTORY: Patient is a " 58-year-old female who was previously  diagnosed with invasive ductal carcinoma status post biopsy of a  suspicious palpable mass specifically at the far lateral 4 o'clock  position. Evaluate the left breast for extent of disease. Evaluate the  right breast for malignancy.     TECHNIQUE: MRI was performed on a 1.5 Patricia magnet utilizing an  8-channel sentinel breast coil. Precontrast spin-echo T1-weighted and  T2-weighted sequences were obtained in the axial plane. Routine dynamic  images were performed following the administration of 11 mL of  MultiHance contrast. Four postcontrast runs were obtained. No contrast  complications occurred. Delayed high resolution postcontrast T1 weighted  sagittal images were also obtained. A CAD system ([employer]) was utilized  for data analysis.     COMPARISON: I have a screening mammogram from 7/31/2023 as well as  diagnostic mammographic and targeted ultrasound images from 9/29/2023 as  well as biopsy images from 10/4/2023 for review and correlation. No  prior breast MRI.     FINDINGS: There are heterogeneous fibroglandular tissues. There is mild  background contrast enhancement of the fibroglandular tissue. Contrast  within the heart is indicative of an adequate contrast bolus.     There is a 1.1 x 1.1 x 1.3 cm rim-enhancing mass within the posterior  depth lateral left breast which correlates nicely with the previously  biopsied mass on the left representing the known malignancy. A vitamin E  capsule also marked that this is the area of palpable concern. There are  no other suspicious enhancing masses or suspicious areas of enhancement  on the left.     There are no suspicious areas of enhancement or suspicious enhancing  masses on the right.     There is no axillary or internal mammary lymphadenopathy.     Incidental note made of MediPort placed over the high right chest within  the superficial tissues.     Impression: Known biopsy-proven 1.3 cm malignancy far lateral left  4  o'clock position.. No MRI findings to suggest multicentric or multifocal  involvement. No adenopathy. No suspicious enhancement on the right.     BI-RADS CATEGORY: 6, KNOWN BIOPSY PROVEN MALIGNANCY     RECOMMENDATIONS: Known left breast malignancy, appropriate clinical  action should be pursued.        This report was finalized on 11/13/2023 10:11 AM by Tatiana Lanier MD.         I personally reviewed the imaging studies.        ASSESSMENT AND PLAN:     Swathi Cain is a 58 y.o. female with a T1 cN0 M0 triple negative invasive ductal carcinoma of the left breast.    She has started neoadjuvant chemotherapy and so far has tolerated it very well with minimal side effects.  We discussed that fatigue will likely get worse if she continues treatment.  She is having a clinical response after the first cycle.  We discussed that even if the mass becomes undetectable on exam, we would still plan to complete the planned treatment.    Follow-up in 2 weeks for cycle 3.      Total time of patient care on day of service including time prior to, face to face with patient, and following visit spent in reviewing records, lab results, imaging studies, discussion with patient, and documentation/charting was > 31 minutes.                    Marga Arreola MD  [facility]    11/22/2023          CC:

## 2023-12-04 ENCOUNTER — TELEPHONE (OUTPATIENT)
Dept: GENETICS | Facility: HOSPITAL | Age: 58
End: 2023-12-04
Payer: COMMERCIAL

## 2023-12-04 ENCOUNTER — DOCUMENTATION (OUTPATIENT)
Dept: GENETICS | Facility: HOSPITAL | Age: 58
End: 2023-12-04
Payer: COMMERCIAL

## 2023-12-04 DIAGNOSIS — Z17.1 MALIGNANT NEOPLASM OF OVERLAPPING SITES OF LEFT BREAST IN FEMALE, ESTROGEN RECEPTOR NEGATIVE: Primary | ICD-10-CM

## 2023-12-04 DIAGNOSIS — C50.812 MALIGNANT NEOPLASM OF OVERLAPPING SITES OF LEFT BREAST IN FEMALE, ESTROGEN RECEPTOR NEGATIVE: Primary | ICD-10-CM

## 2023-12-04 NOTE — PROGRESS NOTES
Swathi Cain, a 58-year-old female, was seen for genetic counseling due to a personal history of breast cancer. Ms. Cain was recently diagnosed with a left-sided triple negative breast cancer at age 58. She is currently undergoing chemotherapy. Ms. Cain retains her uterus and ovaries. She had a colonoscopy five years ago and reports no history polyps. She was interested in discussing her risk for a hereditary cancer syndrome.  Ms. Cain was interested in pursuing a multi-gene panel to evaluate her risk of cancer; therefore, the Hereditary Breast Cancer Guidelines-Based Panel was ordered through [employer] which analyzes BRCA1/2 and 11 additional genes associated with an increased cancer risk. Genetic testing was negative for deleterious mutations in the BRCA1/2 genes and 11 additional genes on this panel (see attached results). These normal results were discussed with Ms. Cain by telephone on 12/42023.    PERTINENT FAMILY HISTORY: (See attached pedigree)   Mother:  Skin cancer  Mat Cousin 1:  Tongue cancer  Mat Cousin 2:  Leukemia    Records regarding the family history were not available for review.     RISK ASSESSMENT:  Ms. Cain's personal history of triple negative breast cancer led to concern for a hereditary cancer syndrome. We discussed BRCA1/2 testing as well as the option of pursuing a panel that would test for other genes known to impact cancer risk in addition to BRCA1/2. She meets NCCN criteria for BRCA1/2 testing based on her personal history of triple negative breast cancer. These risk assessments are based on the family history information provided at the time of the appointment and could change in the future should new information be obtained.    GENETIC COUNSELING: We reviewed the family history information in detail. Cases of cancer follow three general patterns: sporadic, familial, and hereditary. While most cancer is sporadic, some cases appear to occur in family clusters. These cases are  said to be familial and account for 10-20% of cancer cases. Familial cases may be due to a combination of shared genes and environmental factors among family members.  In even fewer families, the cancer is said to be inherited, and the genes responsible for the cancer are known.      Family histories typical of hereditary cancer syndromes usually include multiple first- and second-degree relatives diagnosed with cancer types that define a syndrome. These cases tend to be diagnosed at younger-than-expected ages and can be bilateral or multifocal. The cancer in these families follows an autosomal dominant inheritance pattern, which indicates the likely presence of a mutation in a cancer susceptibility gene. Children and siblings of an individual believed to carry this mutation have a 50% chance of inheriting that mutation, thereby inheriting the increased risk to develop cancer. These mutations can be passed down from the maternal or the paternal lineage.    Hereditary breast cancer accounts for 5-10% of all cases of breast cancer. A significant proportion of hereditary breast and ovarian cancer can be attributed to mutations in the BRCA1 and BRCA2 genes. Mutations in these genes confer an increased risk for breast cancer, ovarian cancer, male breast cancer, prostate cancer, and pancreatic cancer. Women with a BRCA1 or BRCA2 mutation who have already been diagnosed with breast cancer have a 40-60% lifetime risk of a second breast cancer. Women with a BRCA1 or BRCA2 mutation have up to a 56% risk of ovarian cancer. BRCA1 has been more significantly associated with triple negative breast cancers than other genes.      There are other genes that are known to be associated with an increased risk for cancer. Some of these genes have well defined cancer risks and established management guidelines. Other genes that can be tested for have been more recently described, and there may be less data regarding the risks and  therefore may not have established management guidelines. We reviewed that in some cases, the identification of a genetic mutation may impact treatment options for some types of cancer. We discussed these limitations at length. Based on Ms. Cain's desire to get as much information as possible regarding her personal risks and potential risks for her family, she opted to pursue testing through a panel that would look at several other genes known to increase the risk for cancer.    GENETIC TESTING:  The risks, benefits and limitations of genetic testing and implications for clinical management following testing were reviewed. DNA test results can influence decisions regarding screening, prevention and surgical management. Genetic testing can have significant psychological implications for both individuals and families. Also discussed was the possibility of employment and insurance discrimination based on genetic test results and the laws in place to prevent this ([place]).    We discussed panel testing, which would involve testing for BRCA1/2 as well as 11 additional genes that are associated with increased cancer risk. The benefits and limitations of genetic testing were discussed, and Ms. Cain decided to pursue testing via the panel. The implications of a positive or negative test result were discussed. We discussed the possibility that, in some cases, genetic test results may be informative or may be ambiguous due to the identification of a genetic variant. These variants may or may not be associated with an increased cancer risk. With multigene panel testing, it is not uncommon for a variant of uncertain significance (VUS) to be identified. If a VUS is identified, testing family members is typically not recommended and screening recommendations are made based on the family history. The laboratories that perform genetic testing work to reclassify the VUS and send out an amended report if and when a VUS is  reclassified. The majority of variant findings are ultimately reclassified to a negative result. Given her personal and family history, a negative test result would not eliminate all cancer risk to her relatives, although the risk would not be as high as it would with positive genetic testing.      TEST RESULTS:  Genetic testing was negative by sequencing and deletion/duplication testing for mutations in BRCA1/2 and the additional 11 genes on the Hereditary Breast Cancer Guidelines-Based panel. The negative result greatly lowers the risk of a hereditary cancer syndrome for Ms. Cain. Her unaffected female relatives may still be considered to have a somewhat increased risk for breast cancer based on family history. This assessment is based on the information provided at the time of the consultation.    CANCER PREVENTION:  Despite the negative genetic test results, Ms. Cain's female relatives may have a somewhat increased lifetime risk for breast cancer based on family history. Female relatives could have a risk assessment performed using a family history-based model, such as the Tyrer-Cuzick model, to determine their individual risks. Given the increased risk, options available to individuals with an elevated lifetime risk for breast cancer were briefly discussed. Surveillance for relatives found to have an elevated lifetime risk of breast cancer (>20%, versus the average risk of 12%), based on NCCN guidelines, would consist of semi-annual clinical breast exams and monthly self-breast exams starting by age 18 and annual mammography starting 10 years younger than the earliest diagnosis in a close relative, or by age 40. According to an American Cancer Society expert panel and NCCN guidelines, annual breast MRI should be offered to women whose lifetime risk of breast cancer is 20-25 percent or more, also starting 10 years prior to the earliest diagnosis in the family, or by age 40.      PLAN: Genetic counseling  remains available to Ms. Cain. She is welcome to contact us in the future with any questions she may have at [phone]    Freya Bailey MS, [facility] [place], [place]  Licensed Certified Genetic Counselor       Cc: Swathi Lopez MD

## 2023-12-04 NOTE — TELEPHONE ENCOUNTER
Spoke with patient and disclosed negative genetic results. Informed patient these results would be on [email or web address] and sent to her

## 2023-12-07 ENCOUNTER — HOSPITAL ENCOUNTER (OUTPATIENT)
Dept: ONCOLOGY | Facility: HOSPITAL | Age: 58
Discharge: HOME | End: 2023-12-07

## 2023-12-07 ENCOUNTER — HOSPITAL ENCOUNTER (OUTPATIENT)
Dept: ONCOLOGY | Facility: HOSPITAL | Age: 58
Discharge: HOME | End: 2023-12-07
Admitting: INTERNAL MEDICINE
Payer: COMMERCIAL

## 2023-12-07 ENCOUNTER — TELEPHONE (OUTPATIENT)
Dept: ONCOLOGY | Facility: CLINIC | Age: 58
End: 2023-12-07

## 2023-12-07 ENCOUNTER — OFFICE VISIT (OUTPATIENT)
Dept: ONCOLOGY | Facility: CLINIC | Age: 58
End: 2023-12-07
Payer: COMMERCIAL

## 2023-12-07 VITALS
RESPIRATION RATE: 16 BRPM | OXYGEN SATURATION: 98 % | HEART RATE: 116 BPM | WEIGHT: 125 LBS | SYSTOLIC BLOOD PRESSURE: 152 MMHG | TEMPERATURE: 98.2 F | HEIGHT: 65 IN | DIASTOLIC BLOOD PRESSURE: 70 MMHG | BODY MASS INDEX: 20.83 KG/M2

## 2023-12-07 DIAGNOSIS — C50.812 MALIGNANT NEOPLASM OF OVERLAPPING SITES OF LEFT BREAST IN FEMALE, ESTROGEN RECEPTOR NEGATIVE: Primary | ICD-10-CM

## 2023-12-07 DIAGNOSIS — Z17.1 MALIGNANT NEOPLASM OF OVERLAPPING SITES OF LEFT BREAST IN FEMALE, ESTROGEN RECEPTOR NEGATIVE: Primary | ICD-10-CM

## 2023-12-07 LAB
ALBUMIN SERPL-MCNC: 4 G/DL (ref 3.5–5.2)
ALBUMIN/GLOB SERPL: 1.5 G/DL
ALP SERPL-CCNC: 103 U/L (ref 39–117)
ALT SERPL W P-5'-P-CCNC: 15 U/L (ref 1–33)
ANION GAP SERPL CALCULATED.3IONS-SCNC: 10 MMOL/L (ref 5–15)
AST SERPL-CCNC: 16 U/L (ref 1–32)
BASOPHILS # BLD AUTO: 0.04 10*3/MM3 (ref 0–0.2)
BASOPHILS NFR BLD AUTO: 0.4 % (ref 0–1.5)
BILIRUB SERPL-MCNC: <0.2 MG/DL (ref 0–1.2)
BUN SERPL-MCNC: 12 MG/DL (ref 6–20)
BUN/CREAT SERPL: 16.4 (ref 7–25)
CALCIUM SPEC-SCNC: 9 MG/DL (ref 8.6–10.5)
CHLORIDE SERPL-SCNC: 106 MMOL/L (ref 98–107)
CO2 SERPL-SCNC: 23 MMOL/L (ref 22–29)
CREAT SERPL-MCNC: 0.73 MG/DL (ref 0.57–1)
DEPRECATED RDW RBC AUTO: 45 FL (ref 37–54)
EGFRCR SERPLBLD CKD-EPI 2021: 96.7 ML/MIN/1.73
EOSINOPHIL # BLD AUTO: 0.04 10*3/MM3 (ref 0–0.4)
EOSINOPHIL NFR BLD AUTO: 0.4 % (ref 0.3–6.2)
ERYTHROCYTE [DISTWIDTH] IN BLOOD BY AUTOMATED COUNT: 12.6 % (ref 12.3–15.4)
GLOBULIN UR ELPH-MCNC: 2.6 GM/DL
GLUCOSE SERPL-MCNC: 115 MG/DL (ref 65–99)
HCT VFR BLD AUTO: 31.5 % (ref 34–46.6)
HGB BLD-MCNC: 10.7 G/DL (ref 12–15.9)
IMM GRANULOCYTES # BLD AUTO: 0.2 10*3/MM3 (ref 0–0.05)
IMM GRANULOCYTES NFR BLD AUTO: 2.2 % (ref 0–0.5)
LYMPHOCYTES # BLD AUTO: 1.31 10*3/MM3 (ref 0.7–3.1)
LYMPHOCYTES NFR BLD AUTO: 14.5 % (ref 19.6–45.3)
MCH RBC QN AUTO: 34.1 PG (ref 26.6–33)
MCHC RBC AUTO-ENTMCNC: 34 G/DL (ref 31.5–35.7)
MCV RBC AUTO: 100.3 FL (ref 79–97)
MONOCYTES # BLD AUTO: 1.01 10*3/MM3 (ref 0.1–0.9)
MONOCYTES NFR BLD AUTO: 11.2 % (ref 5–12)
NEUTROPHILS NFR BLD AUTO: 6.45 10*3/MM3 (ref 1.7–7)
NEUTROPHILS NFR BLD AUTO: 71.3 % (ref 42.7–76)
PLATELET # BLD AUTO: 162 10*3/MM3 (ref 140–450)
PMV BLD AUTO: 9 FL (ref 6–12)
POTASSIUM SERPL-SCNC: 4 MMOL/L (ref 3.5–5.2)
PROT SERPL-MCNC: 6.6 G/DL (ref 6–8.5)
RBC # BLD AUTO: 3.14 10*6/MM3 (ref 3.77–5.28)
SODIUM SERPL-SCNC: 139 MMOL/L (ref 136–145)
WBC NRBC COR # BLD AUTO: 9.05 10*3/MM3 (ref 3.4–10.8)

## 2023-12-07 PROCEDURE — 96376 TX/PRO/DX INJ SAME DRUG ADON: CPT

## 2023-12-07 PROCEDURE — 25810000003 SODIUM CHLORIDE 0.9 % SOLUTION: Performed by: NURSE PRACTITIONER

## 2023-12-07 PROCEDURE — 96375 TX/PRO/DX INJ NEW DRUG ADDON: CPT

## 2023-12-07 PROCEDURE — 25810000003 SODIUM CHLORIDE 0.9 % SOLUTION 250 ML FLEX CONT: Performed by: NURSE PRACTITIONER

## 2023-12-07 PROCEDURE — 25010000002 DEXAMETHASONE SODIUM PHOSPHATE 100 MG/10ML SOLUTION: Performed by: NURSE PRACTITIONER

## 2023-12-07 PROCEDURE — 96411 CHEMO IV PUSH ADDL DRUG: CPT

## 2023-12-07 PROCEDURE — 80053 COMPREHEN METABOLIC PANEL: CPT | Performed by: INTERNAL MEDICINE

## 2023-12-07 PROCEDURE — 25010000002 CYCLOPHOSPHAMIDE 2 GM/10ML SOLUTION 10 ML VIAL: Performed by: NURSE PRACTITIONER

## 2023-12-07 PROCEDURE — 96367 TX/PROPH/DG ADDL SEQ IV INF: CPT

## 2023-12-07 PROCEDURE — 96377 APPLICATON ON-BODY INJECTOR: CPT

## 2023-12-07 PROCEDURE — 99213 OFFICE O/P EST LOW 20 MIN: CPT | Performed by: NURSE PRACTITIONER

## 2023-12-07 PROCEDURE — 25010000002 HEPARIN LOCK FLUSH PER 10 UNITS: Performed by: INTERNAL MEDICINE

## 2023-12-07 PROCEDURE — 25010000002 PALONOSETRON PER 25 MCG: Performed by: NURSE PRACTITIONER

## 2023-12-07 PROCEDURE — 25010000002 PEGFILGRASTIM 6 MG/0.6ML PREFILLED SYRINGE KIT: Performed by: NURSE PRACTITIONER

## 2023-12-07 PROCEDURE — 25010000002 DOXORUBICIN PER 10 MG: Performed by: NURSE PRACTITIONER

## 2023-12-07 PROCEDURE — 96409 CHEMO IV PUSH SNGL DRUG: CPT

## 2023-12-07 PROCEDURE — 85025 COMPLETE CBC W/AUTO DIFF WBC: CPT | Performed by: INTERNAL MEDICINE

## 2023-12-07 PROCEDURE — 25010000002 FOSAPREPITANT PER 1 MG: Performed by: NURSE PRACTITIONER

## 2023-12-07 PROCEDURE — 96413 CHEMO IV INFUSION 1 HR: CPT

## 2023-12-07 RX ORDER — SODIUM CHLORIDE 9 MG/ML
20 INJECTION, SOLUTION INTRAVENOUS ONCE
Status: CANCELLED | OUTPATIENT
Start: 2023-12-07

## 2023-12-07 RX ORDER — HEPARIN SODIUM (PORCINE) LOCK FLUSH IV SOLN 100 UNIT/ML 100 UNIT/ML
500 SOLUTION INTRAVENOUS AS NEEDED
Status: DISCONTINUED | OUTPATIENT
Start: 2023-12-07 | End: 2023-12-08 | Stop reason: HOSPADM

## 2023-12-07 RX ORDER — PALONOSETRON 0.05 MG/ML
0.25 INJECTION, SOLUTION INTRAVENOUS ONCE
Status: CANCELLED | OUTPATIENT
Start: 2023-12-07

## 2023-12-07 RX ORDER — DOXORUBICIN HYDROCHLORIDE 2 MG/ML
60 INJECTION, SOLUTION INTRAVENOUS ONCE
Status: CANCELLED | OUTPATIENT
Start: 2023-12-07

## 2023-12-07 RX ORDER — SODIUM CHLORIDE 0.9 % (FLUSH) 0.9 %
10 SYRINGE (ML) INJECTION AS NEEDED
OUTPATIENT
Start: 2023-12-07

## 2023-12-07 RX ORDER — HEPARIN SODIUM (PORCINE) LOCK FLUSH IV SOLN 100 UNIT/ML 100 UNIT/ML
500 SOLUTION INTRAVENOUS AS NEEDED
OUTPATIENT
Start: 2023-12-07

## 2023-12-07 RX ORDER — SODIUM CHLORIDE 9 MG/ML
20 INJECTION, SOLUTION INTRAVENOUS ONCE
Status: COMPLETED | OUTPATIENT
Start: 2023-12-07 | End: 2023-12-07

## 2023-12-07 RX ORDER — PALONOSETRON 0.05 MG/ML
0.25 INJECTION, SOLUTION INTRAVENOUS ONCE
Status: COMPLETED | OUTPATIENT
Start: 2023-12-07 | End: 2023-12-07

## 2023-12-07 RX ORDER — DOXORUBICIN HYDROCHLORIDE 2 MG/ML
60 INJECTION, SOLUTION INTRAVENOUS ONCE
Status: COMPLETED | OUTPATIENT
Start: 2023-12-07 | End: 2023-12-07

## 2023-12-07 RX ADMIN — SODIUM CHLORIDE 150 MG: 9 INJECTION, SOLUTION INTRAVENOUS at 12:20

## 2023-12-07 RX ADMIN — DOXORUBICIN HYDROCHLORIDE 49 MG: 2 INJECTION, SOLUTION INTRAVENOUS at 13:07

## 2023-12-07 RX ADMIN — DEXAMETHASONE SODIUM PHOSPHATE 12 MG: 10 INJECTION, SOLUTION INTRAMUSCULAR; INTRAVENOUS at 12:20

## 2023-12-07 RX ADMIN — SODIUM CHLORIDE 20 ML/HR: 9 INJECTION, SOLUTION INTRAVENOUS at 12:15

## 2023-12-07 RX ADMIN — PALONOSETRON 0.25 MG: 0.05 INJECTION, SOLUTION INTRAVENOUS at 12:15

## 2023-12-07 RX ADMIN — HEPARIN 500 UNITS: 100 SYRINGE at 13:57

## 2023-12-07 RX ADMIN — PEGFILGRASTIM 6 MG: KIT SUBCUTANEOUS at 14:00

## 2023-12-07 RX ADMIN — CYCLOPHOSPHAMIDE 980 MG: 200 INJECTION, SOLUTION INTRAVENOUS at 13:19

## 2023-12-07 NOTE — TELEPHONE ENCOUNTER
Pt dropped off set of [employer] insurance claim forms to be completed on her behalf.  Requesting them to be faxed forward.    No form fee collected    Paperwork placed in MA box ([employer])

## 2023-12-07 NOTE — PROGRESS NOTES
"      PROBLEM LIST:  [id number] triple negative (her2 0+) invasive ductal carcinoma of the left breast  Biopsy of a 1.2 cm left breast mass on 10/4/23.  Pathology showed a high grade IDC.  Neoadjuvant chemotherapy with ddAC followed by taxol started 11/9/23    Subjective     CHIEF COMPLAINT: breast cancer    HISTORY OF PRESENT ILLNESS:   Swathi Cain returns for follow-up.   She has completed 2 cycles of dose dense AC.  She tolerated treatment well.  She has some mild fatigue.  She denies any nausea.  She has had some increased nasal drainage which is clear over the past month.  She is using Ocean nasal spray and taking Zyrtec daily.  She denies any cough or shortness of air.    She feels like the breast mass is a little flatter and smaller.      Objective      /70   Pulse 116   Temp 98.2 °F (36.8 °C)   Resp 16   Ht 165.1 cm (65\")   Wt 56.7 kg (125 lb)   SpO2 98%   BMI 20.80 kg/m²   Vitals:    12/07/23 1050   PainSc: 0-No pain               ECOG score: 0             General: well appearing female in no acute distress  Neuro: alert and oriented  HEENT: sclera anicteric, oropharynx clear  Breast: In the left breast at 3:00 there is a movable approximately 5 mm mass  Cardiovascular: regular rate and rhythm, no murmurs  Lungs: clear to auscultation bilaterally  Abdomen: soft, nontender, nondistended.  No palpable organomegaly  Extremeties: no lower extremity edema  Skin: no rashes, lesions, bruising, or petechiae  Psych: mood and affect appropriate            RECENT LABS:  Lab Results   Component Value Date    WBC 9.05 12/07/2023    HGB 10.7 (L) 12/07/2023    HCT 31.5 (L) 12/07/2023    .3 (H) 12/07/2023     12/07/2023       Lab Results   Component Value Date    GLUCOSE 115 (H) 11/22/2023    BUN 11 11/22/2023    CREATININE 0.73 11/22/2023    BCR 15.1 11/22/2023    K 3.5 11/22/2023    CO2 24.0 11/22/2023    CALCIUM 8.6 11/22/2023    ALBUMIN 4.0 11/22/2023    AST 18 11/22/2023    ALT 15 11/22/2023 "       MRI Breast Bilateral Diagnostic With & Without Contrast  Narrative: BILATERAL BREAST MRI WITH CONTRAST     CLINICAL HISTORY: Patient is a 58-year-old female who was previously  diagnosed with invasive ductal carcinoma status post biopsy of a  suspicious palpable mass specifically at the far lateral 4 o'clock  position. Evaluate the left breast for extent of disease. Evaluate the  right breast for malignancy.     TECHNIQUE: MRI was performed on a 1.5 Patricia magnet utilizing an  8-channel sentinel breast coil. Precontrast spin-echo T1-weighted and  T2-weighted sequences were obtained in the axial plane. Routine dynamic  images were performed following the administration of 11 mL of  MultiHance contrast. Four postcontrast runs were obtained. No contrast  complications occurred. Delayed high resolution postcontrast T1 weighted  sagittal images were also obtained. A CAD system ([employer]) was utilized  for data analysis.     COMPARISON: I have a screening mammogram from 7/31/2023 as well as  diagnostic mammographic and targeted ultrasound images from 9/29/2023 as  well as biopsy images from 10/4/2023 for review and correlation. No  prior breast MRI.     FINDINGS: There are heterogeneous fibroglandular tissues. There is mild  background contrast enhancement of the fibroglandular tissue. Contrast  within the heart is indicative of an adequate contrast bolus.     There is a 1.1 x 1.1 x 1.3 cm rim-enhancing mass within the posterior  depth lateral left breast which correlates nicely with the previously  biopsied mass on the left representing the known malignancy. A vitamin E  capsule also marked that this is the area of palpable concern. There are  no other suspicious enhancing masses or suspicious areas of enhancement  on the left.     There are no suspicious areas of enhancement or suspicious enhancing  masses on the right.     There is no axillary or internal mammary lymphadenopathy.     Incidental note made of  MediPort placed over the high right chest within  the superficial tissues.     Impression: Known biopsy-proven 1.3 cm malignancy far lateral left 4  o'clock position.. No MRI findings to suggest multicentric or multifocal  involvement. No adenopathy. No suspicious enhancement on the right.     BI-RADS CATEGORY: 6, KNOWN BIOPSY PROVEN MALIGNANCY     RECOMMENDATIONS: Known left breast malignancy, appropriate clinical  action should be pursued.        This report was finalized on 11/13/2023 10:11 AM by Tatiana Lanier MD.         I personally reviewed the imaging studies.        ASSESSMENT AND PLAN:     Swathi Cain is a 58 y.o. female with a T1 cN0 M0 triple negative invasive ductal carcinoma of the left breast.    She has completed 2 cycles of neoadjuvant dose dense AC and is tolerating it fairly well with minimal side effects.  We will continue with cycle 3 with doses unchanged.  CBC from today was reviewed and is stable.    She is considering the cryo compression study which would begin with the Taxol.  Research staff will be meeting with patient today in infusion.    Follow-up in 2 weeks for cycle 4.                        Sally Arango, ANABELA  [facility] Hematology and On[facility]    12/7/2023          CC:

## 2023-12-08 ENCOUNTER — DOCUMENTATION (OUTPATIENT)
Dept: ONCOLOGY | Facility: CLINIC | Age: 58
End: 2023-12-08
Payer: COMMERCIAL

## 2023-12-15 DIAGNOSIS — C50.812 MALIGNANT NEOPLASM OF OVERLAPPING SITES OF LEFT BREAST IN FEMALE, ESTROGEN RECEPTOR NEGATIVE: Primary | ICD-10-CM

## 2023-12-15 DIAGNOSIS — Z17.1 MALIGNANT NEOPLASM OF OVERLAPPING SITES OF LEFT BREAST IN FEMALE, ESTROGEN RECEPTOR NEGATIVE: Primary | ICD-10-CM

## 2023-12-21 ENCOUNTER — RESEARCH ENCOUNTER (OUTPATIENT)
Dept: OTHER | Facility: OTHER | Age: 58
End: 2023-12-21
Payer: COMMERCIAL

## 2023-12-21 ENCOUNTER — OFFICE VISIT (OUTPATIENT)
Dept: ONCOLOGY | Facility: CLINIC | Age: 58
End: 2023-12-21
Payer: COMMERCIAL

## 2023-12-21 ENCOUNTER — HOSPITAL ENCOUNTER (OUTPATIENT)
Dept: ONCOLOGY | Facility: HOSPITAL | Age: 58
Discharge: HOME | End: 2023-12-21
Admitting: INTERNAL MEDICINE
Payer: COMMERCIAL

## 2023-12-21 VITALS
OXYGEN SATURATION: 100 % | WEIGHT: 126 LBS | DIASTOLIC BLOOD PRESSURE: 76 MMHG | TEMPERATURE: 98.5 F | RESPIRATION RATE: 16 BRPM | HEART RATE: 118 BPM | SYSTOLIC BLOOD PRESSURE: 132 MMHG | HEIGHT: 65 IN | BODY MASS INDEX: 20.99 KG/M2

## 2023-12-21 DIAGNOSIS — Z17.1 MALIGNANT NEOPLASM OF OVERLAPPING SITES OF LEFT BREAST IN FEMALE, ESTROGEN RECEPTOR NEGATIVE: Primary | ICD-10-CM

## 2023-12-21 DIAGNOSIS — C50.812 MALIGNANT NEOPLASM OF OVERLAPPING SITES OF LEFT BREAST IN FEMALE, ESTROGEN RECEPTOR NEGATIVE: Primary | ICD-10-CM

## 2023-12-21 LAB
ALBUMIN SERPL-MCNC: 4.1 G/DL (ref 3.5–5.2)
ALBUMIN/GLOB SERPL: 1.7 G/DL
ALP SERPL-CCNC: 104 U/L (ref 39–117)
ALT SERPL W P-5'-P-CCNC: 15 U/L (ref 1–33)
ANION GAP SERPL CALCULATED.3IONS-SCNC: 9 MMOL/L (ref 5–15)
AST SERPL-CCNC: 18 U/L (ref 1–32)
BASOPHILS # BLD AUTO: 0.05 10*3/MM3 (ref 0–0.2)
BASOPHILS NFR BLD AUTO: 0.5 % (ref 0–1.5)
BILIRUB SERPL-MCNC: 0.2 MG/DL (ref 0–1.2)
BUN SERPL-MCNC: 13 MG/DL (ref 6–20)
BUN/CREAT SERPL: 22.4 (ref 7–25)
CALCIUM SPEC-SCNC: 8.9 MG/DL (ref 8.6–10.5)
CHLORIDE SERPL-SCNC: 106 MMOL/L (ref 98–107)
CO2 SERPL-SCNC: 24 MMOL/L (ref 22–29)
CREAT SERPL-MCNC: 0.58 MG/DL (ref 0.57–1)
DEPRECATED RDW RBC AUTO: 48.2 FL (ref 37–54)
EGFRCR SERPLBLD CKD-EPI 2021: 106.4 ML/MIN/1.73
EOSINOPHIL # BLD AUTO: 0.06 10*3/MM3 (ref 0–0.4)
EOSINOPHIL NFR BLD AUTO: 0.6 % (ref 0.3–6.2)
ERYTHROCYTE [DISTWIDTH] IN BLOOD BY AUTOMATED COUNT: 14.8 % (ref 12.3–15.4)
GLOBULIN UR ELPH-MCNC: 2.4 GM/DL
GLUCOSE SERPL-MCNC: 119 MG/DL (ref 65–99)
HCT VFR BLD AUTO: 30.2 % (ref 34–46.6)
HGB BLD-MCNC: 10.1 G/DL (ref 12–15.9)
IMM GRANULOCYTES # BLD AUTO: 0.43 10*3/MM3 (ref 0–0.05)
IMM GRANULOCYTES NFR BLD AUTO: 4.5 % (ref 0–0.5)
LYMPHOCYTES # BLD AUTO: 1.09 10*3/MM3 (ref 0.7–3.1)
LYMPHOCYTES NFR BLD AUTO: 11.5 % (ref 19.6–45.3)
MCH RBC QN AUTO: 34 PG (ref 26.6–33)
MCHC RBC AUTO-ENTMCNC: 33.4 G/DL (ref 31.5–35.7)
MCV RBC AUTO: 101.7 FL (ref 79–97)
MONOCYTES # BLD AUTO: 1.11 10*3/MM3 (ref 0.1–0.9)
MONOCYTES NFR BLD AUTO: 11.7 % (ref 5–12)
NEUTROPHILS NFR BLD AUTO: 6.72 10*3/MM3 (ref 1.7–7)
NEUTROPHILS NFR BLD AUTO: 71.2 % (ref 42.7–76)
PLATELET # BLD AUTO: 224 10*3/MM3 (ref 140–450)
PMV BLD AUTO: 9.2 FL (ref 6–12)
POTASSIUM SERPL-SCNC: 4.1 MMOL/L (ref 3.5–5.2)
PROT SERPL-MCNC: 6.5 G/DL (ref 6–8.5)
RBC # BLD AUTO: 2.97 10*6/MM3 (ref 3.77–5.28)
SODIUM SERPL-SCNC: 139 MMOL/L (ref 136–145)
WBC NRBC COR # BLD AUTO: 9.46 10*3/MM3 (ref 3.4–10.8)

## 2023-12-21 PROCEDURE — 25810000003 SODIUM CHLORIDE 0.9 % SOLUTION: Performed by: NURSE PRACTITIONER

## 2023-12-21 PROCEDURE — 25010000002 DEXAMETHASONE SODIUM PHOSPHATE 100 MG/10ML SOLUTION: Performed by: NURSE PRACTITIONER

## 2023-12-21 PROCEDURE — 25010000002 CYCLOPHOSPHAMIDE 2 GM/10ML SOLUTION 10 ML VIAL: Performed by: NURSE PRACTITIONER

## 2023-12-21 PROCEDURE — 25010000002 DOXORUBICIN PER 10 MG: Performed by: NURSE PRACTITIONER

## 2023-12-21 PROCEDURE — 25010000002 FOSAPREPITANT PER 1 MG: Performed by: NURSE PRACTITIONER

## 2023-12-21 PROCEDURE — 96367 TX/PROPH/DG ADDL SEQ IV INF: CPT

## 2023-12-21 PROCEDURE — 25010000002 PEGFILGRASTIM 6 MG/0.6ML PREFILLED SYRINGE KIT: Performed by: NURSE PRACTITIONER

## 2023-12-21 PROCEDURE — 96409 CHEMO IV PUSH SNGL DRUG: CPT

## 2023-12-21 PROCEDURE — 80053 COMPREHEN METABOLIC PANEL: CPT | Performed by: INTERNAL MEDICINE

## 2023-12-21 PROCEDURE — 99213 OFFICE O/P EST LOW 20 MIN: CPT | Performed by: NURSE PRACTITIONER

## 2023-12-21 PROCEDURE — 25810000003 SODIUM CHLORIDE 0.9 % SOLUTION 250 ML FLEX CONT: Performed by: NURSE PRACTITIONER

## 2023-12-21 PROCEDURE — 85025 COMPLETE CBC W/AUTO DIFF WBC: CPT | Performed by: INTERNAL MEDICINE

## 2023-12-21 PROCEDURE — 96376 TX/PRO/DX INJ SAME DRUG ADON: CPT

## 2023-12-21 PROCEDURE — 96411 CHEMO IV PUSH ADDL DRUG: CPT

## 2023-12-21 PROCEDURE — 96375 TX/PRO/DX INJ NEW DRUG ADDON: CPT

## 2023-12-21 PROCEDURE — 25010000002 PALONOSETRON PER 25 MCG: Performed by: NURSE PRACTITIONER

## 2023-12-21 PROCEDURE — 96413 CHEMO IV INFUSION 1 HR: CPT

## 2023-12-21 PROCEDURE — 25010000002 HEPARIN LOCK FLUSH PER 10 UNITS: Performed by: INTERNAL MEDICINE

## 2023-12-21 PROCEDURE — 96377 APPLICATON ON-BODY INJECTOR: CPT

## 2023-12-21 RX ORDER — DOXORUBICIN HYDROCHLORIDE 2 MG/ML
60 INJECTION, SOLUTION INTRAVENOUS ONCE
Status: COMPLETED | OUTPATIENT
Start: 2023-12-21 | End: 2023-12-21

## 2023-12-21 RX ORDER — HEPARIN SODIUM (PORCINE) LOCK FLUSH IV SOLN 100 UNIT/ML 100 UNIT/ML
500 SOLUTION INTRAVENOUS AS NEEDED
Status: DISCONTINUED | OUTPATIENT
Start: 2023-12-21 | End: 2023-12-22 | Stop reason: HOSPADM

## 2023-12-21 RX ORDER — SODIUM CHLORIDE 9 MG/ML
20 INJECTION, SOLUTION INTRAVENOUS ONCE
Status: CANCELLED | OUTPATIENT
Start: 2023-12-21

## 2023-12-21 RX ORDER — SODIUM CHLORIDE 0.9 % (FLUSH) 0.9 %
10 SYRINGE (ML) INJECTION AS NEEDED
OUTPATIENT
Start: 2023-12-21

## 2023-12-21 RX ORDER — SODIUM CHLORIDE 9 MG/ML
20 INJECTION, SOLUTION INTRAVENOUS ONCE
Status: COMPLETED | OUTPATIENT
Start: 2023-12-21 | End: 2023-12-21

## 2023-12-21 RX ORDER — SODIUM CHLORIDE 0.9 % (FLUSH) 0.9 %
10 SYRINGE (ML) INJECTION AS NEEDED
Status: DISCONTINUED | OUTPATIENT
Start: 2023-12-21 | End: 2023-12-22 | Stop reason: HOSPADM

## 2023-12-21 RX ORDER — PALONOSETRON 0.05 MG/ML
0.25 INJECTION, SOLUTION INTRAVENOUS ONCE
Status: CANCELLED | OUTPATIENT
Start: 2023-12-21

## 2023-12-21 RX ORDER — DOXORUBICIN HYDROCHLORIDE 2 MG/ML
60 INJECTION, SOLUTION INTRAVENOUS ONCE
Status: CANCELLED | OUTPATIENT
Start: 2023-12-21

## 2023-12-21 RX ORDER — HEPARIN SODIUM (PORCINE) LOCK FLUSH IV SOLN 100 UNIT/ML 100 UNIT/ML
500 SOLUTION INTRAVENOUS AS NEEDED
OUTPATIENT
Start: 2023-12-21

## 2023-12-21 RX ORDER — PALONOSETRON 0.05 MG/ML
0.25 INJECTION, SOLUTION INTRAVENOUS ONCE
Status: COMPLETED | OUTPATIENT
Start: 2023-12-21 | End: 2023-12-21

## 2023-12-21 RX ADMIN — PALONOSETRON HYDROCHLORIDE 0.25 MG: 0.25 INJECTION INTRAVENOUS at 10:04

## 2023-12-21 RX ADMIN — CYCLOPHOSPHAMIDE 980 MG: 200 INJECTION, SOLUTION INTRAVENOUS at 11:26

## 2023-12-21 RX ADMIN — SODIUM CHLORIDE 150 MG: 9 INJECTION, SOLUTION INTRAVENOUS at 10:05

## 2023-12-21 RX ADMIN — PEGFILGRASTIM 6 MG: KIT SUBCUTANEOUS at 12:13

## 2023-12-21 RX ADMIN — DEXAMETHASONE SODIUM PHOSPHATE 12 MG: 10 INJECTION, SOLUTION INTRAMUSCULAR; INTRAVENOUS at 10:05

## 2023-12-21 RX ADMIN — HEPARIN 500 UNITS: 100 SYRINGE at 12:09

## 2023-12-21 RX ADMIN — DOXORUBICIN HYDROCHLORIDE 49 MG: 2 INJECTION, SOLUTION INTRAVENOUS at 11:07

## 2023-12-21 RX ADMIN — SODIUM CHLORIDE 20 ML/HR: 9 INJECTION, SOLUTION INTRAVENOUS at 10:04

## 2023-12-21 NOTE — RESEARCH
Patient Name:  Swathi Cain  YOB: 1965  Patient Age:  58 y.o.  Patient's Sex:  female    Date of Service:  12/21/2023    Provider:  ADAM Arreola MD                     RESEARCH NOTE                  Protocol --ICE COMPRESS: Randomized Trial of Limb Cryocompression Versus Continuous Compression Versus Low Cyclic Compression for the Prevention of Taxane-Induced Peripheral Neuropathy   NCT #[id number]      Information (all 8 elements of the ICF) concerning the protocol including, description of procedures to be followed, participant responsibilities, confidentiality, foreseeable risks, compensation, availability, benefits and alternatives to participation, was reviewed with the research participant in an understandable language. The participant was encouraged to ask questions throughout the informed consent process. Any questions and/or concerns were answered to the participant's satisfaction.     After giving the participant time to consider, the participant chose to voluntarily participate in the study. The informed consent document signature process was completed.     Participant authorization for the use and disclosure of protected health information for research purposes (HIPAA Privacy Rule) was acknowledged and signed on the date noted on the consent form.     Contact information for the research department and physician/investigator was provided. A copy of the signed informed consent form was given to the study participant.     No study specific assessments were completed prior to obtaining informed consent.     By signing this document, I attest that I participated in the informed consent discussion with the participant.    At the time of consent, the participant was given the Patient Information Sheet.    Required QOLs were completed. Eligibility to be reviewed and plan for randomization to study next week within protocol timeframe.    The participant agreed to specimen biobanking which will  be collected and processed prior to first treatment, once participant is enrolled.  The following assessments will also be completed prior to first treatment:  Neuropen (monofilament and Neurotip)  [place] ([address])  Timed Get Up and Go  Participant plans to begin treatment, Thursday, 01/04/2024.    Thank you.  Rosio Moise RN, BSN, CRC

## 2023-12-21 NOTE — PROGRESS NOTES
"      PROBLEM LIST:  [id number] triple negative (her2 0+) invasive ductal carcinoma of the left breast  Biopsy of a 1.2 cm left breast mass on 10/4/23.  Pathology showed a high grade IDC.  Neoadjuvant chemotherapy with ddAC followed by taxol started 11/9/23    Subjective     CHIEF COMPLAINT: breast cancer    HISTORY OF PRESENT ILLNESS:   Swathi Cain returns for follow-up.   She has completed 3 cycles of dose dense AC.  She tolerated treatment well.  She noticed some increased fatigue following cycle 3.  She had 1 day of nausea that was controlled with Zofran.  She denies any cough or shortness of air.    The last few days she has been able to exercise about 30 minutes a day.        Objective      /76   Pulse 118   Temp 98.5 °F (36.9 °C) (Temporal)   Resp 16   Ht 165.1 cm (65\")   Wt 57.2 kg (126 lb)   SpO2 100%   BMI 20.97 kg/m²   Vitals:    12/21/23 0854   PainSc: 0-No pain                 ECOG score: 0             General: well appearing female in no acute distress  Neuro: alert and oriented  HEENT: sclera anicteric, oropharynx clear  Breast: In the left breast at 3:00 there is no longer palpable mass noted  Cardiovascular: regular rate and rhythm, no murmurs  Lungs: clear to auscultation bilaterally  Abdomen: soft, nontender, nondistended.  No palpable organomegaly  Extremeties: no lower extremity edema  Skin: no rashes, lesions, bruising, or petechiae  Psych: mood and affect appropriate            RECENT LABS:  Lab Results   Component Value Date    WBC 9.46 12/21/2023    HGB 10.1 (L) 12/21/2023    HCT 30.2 (L) 12/21/2023    .7 (H) 12/21/2023     12/21/2023       Lab Results   Component Value Date    GLUCOSE 115 (H) 12/07/2023    BUN 12 12/07/2023    CREATININE 0.73 12/07/2023    BCR 16.4 12/07/2023    K 4.0 12/07/2023    CO2 23.0 12/07/2023    CALCIUM 9.0 12/07/2023    ALBUMIN 4.0 12/07/2023    AST 16 12/07/2023    ALT 15 12/07/2023       MRI Breast Bilateral Diagnostic With & Without " Contrast  Narrative: BILATERAL BREAST MRI WITH CONTRAST     CLINICAL HISTORY: Patient is a 58-year-old female who was previously  diagnosed with invasive ductal carcinoma status post biopsy of a  suspicious palpable mass specifically at the far lateral 4 o'clock  position. Evaluate the left breast for extent of disease. Evaluate the  right breast for malignancy.     TECHNIQUE: MRI was performed on a 1.5 Patricia magnet utilizing an  8-channel sentinel breast coil. Precontrast spin-echo T1-weighted and  T2-weighted sequences were obtained in the axial plane. Routine dynamic  images were performed following the administration of 11 mL of  MultiHance contrast. Four postcontrast runs were obtained. No contrast  complications occurred. Delayed high resolution postcontrast T1 weighted  sagittal images were also obtained. A CAD system ([employer]) was utilized  for data analysis.     COMPARISON: I have a screening mammogram from 7/31/2023 as well as  diagnostic mammographic and targeted ultrasound images from 9/29/2023 as  well as biopsy images from 10/4/2023 for review and correlation. No  prior breast MRI.     FINDINGS: There are heterogeneous fibroglandular tissues. There is mild  background contrast enhancement of the fibroglandular tissue. Contrast  within the heart is indicative of an adequate contrast bolus.     There is a 1.1 x 1.1 x 1.3 cm rim-enhancing mass within the posterior  depth lateral left breast which correlates nicely with the previously  biopsied mass on the left representing the known malignancy. A vitamin E  capsule also marked that this is the area of palpable concern. There are  no other suspicious enhancing masses or suspicious areas of enhancement  on the left.     There are no suspicious areas of enhancement or suspicious enhancing  masses on the right.     There is no axillary or internal mammary lymphadenopathy.     Incidental note made of MediPort placed over the high right chest within  the  superficial tissues.     Impression: Known biopsy-proven 1.3 cm malignancy far lateral left 4  o'clock position.. No MRI findings to suggest multicentric or multifocal  involvement. No adenopathy. No suspicious enhancement on the right.     BI-RADS CATEGORY: 6, KNOWN BIOPSY PROVEN MALIGNANCY     RECOMMENDATIONS: Known left breast malignancy, appropriate clinical  action should be pursued.        This report was finalized on 11/13/2023 10:11 AM by Tatiana Lanier MD.         I personally reviewed the imaging studies.        ASSESSMENT AND PLAN:     Swathi Cain is a 58 y.o. female with a T1 cN0 M0 triple negative invasive ductal carcinoma of the left breast.    She has completed 3 cycles of neoadjuvant dose dense AC and is tolerating it fairly well with minimal side effects.  We will continue with cycle 4 with doses unchanged.  CBC from today was reviewed and is stable.  In 2 weeks she will begin weekly Taxol.  We discussed potential side effects of Taxol including myelosuppression, fatigue, hair loss, body aches and neuropathy.    She has consented to the cryo compression study which will begin with Taxol in 2 weeks.  Research staff will be meeting with patient today to have consent signed.    Follow-up in 2 weeks to begin weekly Taxol.                        Sally Arango APRN  [facility] Hematology and Oncology    12/21/2023          CC:

## 2024-01-04 ENCOUNTER — DOCUMENTATION (OUTPATIENT)
Dept: NUTRITION | Facility: HOSPITAL | Age: 59
End: 2024-01-04
Payer: COMMERCIAL

## 2024-01-04 ENCOUNTER — HOSPITAL ENCOUNTER (OUTPATIENT)
Dept: ONCOLOGY | Facility: HOSPITAL | Age: 59
Discharge: HOME | End: 2024-01-04
Admitting: INTERNAL MEDICINE
Payer: COMMERCIAL

## 2024-01-04 ENCOUNTER — OFFICE VISIT (OUTPATIENT)
Dept: ONCOLOGY | Facility: CLINIC | Age: 59
End: 2024-01-04
Payer: COMMERCIAL

## 2024-01-04 ENCOUNTER — RESEARCH ENCOUNTER (OUTPATIENT)
Dept: OTHER | Facility: OTHER | Age: 59
End: 2024-01-04
Payer: COMMERCIAL

## 2024-01-04 VITALS
OXYGEN SATURATION: 98 % | TEMPERATURE: 97.5 F | WEIGHT: 125 LBS | HEIGHT: 65 IN | RESPIRATION RATE: 18 BRPM | HEART RATE: 125 BPM | DIASTOLIC BLOOD PRESSURE: 76 MMHG | SYSTOLIC BLOOD PRESSURE: 140 MMHG | BODY MASS INDEX: 20.83 KG/M2

## 2024-01-04 VITALS — DIASTOLIC BLOOD PRESSURE: 68 MMHG | RESPIRATION RATE: 16 BRPM | HEART RATE: 96 BPM | SYSTOLIC BLOOD PRESSURE: 130 MMHG

## 2024-01-04 DIAGNOSIS — Z17.1 MALIGNANT NEOPLASM OF OVERLAPPING SITES OF LEFT BREAST IN FEMALE, ESTROGEN RECEPTOR NEGATIVE: Primary | ICD-10-CM

## 2024-01-04 DIAGNOSIS — C50.812 MALIGNANT NEOPLASM OF OVERLAPPING SITES OF LEFT BREAST IN FEMALE, ESTROGEN RECEPTOR NEGATIVE: Primary | ICD-10-CM

## 2024-01-04 LAB
ALBUMIN SERPL-MCNC: 4.1 G/DL (ref 3.5–5.2)
ALBUMIN/GLOB SERPL: 1.8 G/DL
ALP SERPL-CCNC: 105 U/L (ref 39–117)
ALT SERPL W P-5'-P-CCNC: 17 U/L (ref 1–33)
ANION GAP SERPL CALCULATED.3IONS-SCNC: 11 MMOL/L (ref 5–15)
AST SERPL-CCNC: 17 U/L (ref 1–32)
BASOPHILS # BLD AUTO: 0.04 10*3/MM3 (ref 0–0.2)
BASOPHILS NFR BLD AUTO: 0.6 % (ref 0–1.5)
BILIRUB SERPL-MCNC: 0.2 MG/DL (ref 0–1.2)
BUN SERPL-MCNC: 8 MG/DL (ref 6–20)
BUN/CREAT SERPL: 13.3 (ref 7–25)
CALCIUM SPEC-SCNC: 8.8 MG/DL (ref 8.6–10.5)
CHLORIDE SERPL-SCNC: 108 MMOL/L (ref 98–107)
CO2 SERPL-SCNC: 22 MMOL/L (ref 22–29)
CREAT SERPL-MCNC: 0.6 MG/DL (ref 0.57–1)
DEPRECATED RDW RBC AUTO: 60.9 FL (ref 37–54)
EGFRCR SERPLBLD CKD-EPI 2021: 105.5 ML/MIN/1.73
EOSINOPHIL # BLD AUTO: 0.06 10*3/MM3 (ref 0–0.4)
EOSINOPHIL NFR BLD AUTO: 0.9 % (ref 0.3–6.2)
ERYTHROCYTE [DISTWIDTH] IN BLOOD BY AUTOMATED COUNT: 17.7 % (ref 12.3–15.4)
GLOBULIN UR ELPH-MCNC: 2.3 GM/DL
GLUCOSE SERPL-MCNC: 119 MG/DL (ref 65–99)
HCT VFR BLD AUTO: 28.4 % (ref 34–46.6)
HGB BLD-MCNC: 9.2 G/DL (ref 12–15.9)
IMM GRANULOCYTES # BLD AUTO: 0.1 10*3/MM3 (ref 0–0.05)
IMM GRANULOCYTES NFR BLD AUTO: 1.6 % (ref 0–0.5)
LYMPHOCYTES # BLD AUTO: 0.82 10*3/MM3 (ref 0.7–3.1)
LYMPHOCYTES NFR BLD AUTO: 13 % (ref 19.6–45.3)
MCH RBC QN AUTO: 34.3 PG (ref 26.6–33)
MCHC RBC AUTO-ENTMCNC: 32.4 G/DL (ref 31.5–35.7)
MCV RBC AUTO: 106 FL (ref 79–97)
MONOCYTES # BLD AUTO: 1.04 10*3/MM3 (ref 0.1–0.9)
MONOCYTES NFR BLD AUTO: 16.5 % (ref 5–12)
NEUTROPHILS NFR BLD AUTO: 4.26 10*3/MM3 (ref 1.7–7)
NEUTROPHILS NFR BLD AUTO: 67.4 % (ref 42.7–76)
PLATELET # BLD AUTO: 161 10*3/MM3 (ref 140–450)
PMV BLD AUTO: 9.6 FL (ref 6–12)
POTASSIUM SERPL-SCNC: 4 MMOL/L (ref 3.5–5.2)
PROT SERPL-MCNC: 6.4 G/DL (ref 6–8.5)
RBC # BLD AUTO: 2.68 10*6/MM3 (ref 3.77–5.28)
SODIUM SERPL-SCNC: 141 MMOL/L (ref 136–145)
WBC NRBC COR # BLD AUTO: 6.32 10*3/MM3 (ref 3.4–10.8)

## 2024-01-04 PROCEDURE — 99214 OFFICE O/P EST MOD 30 MIN: CPT | Performed by: INTERNAL MEDICINE

## 2024-01-04 PROCEDURE — 25010000002 PACLITAXEL PER 1 MG: Performed by: INTERNAL MEDICINE

## 2024-01-04 PROCEDURE — 25810000003 SODIUM CHLORIDE 0.9 % SOLUTION 250 ML FLEX CONT: Performed by: INTERNAL MEDICINE

## 2024-01-04 PROCEDURE — 96375 TX/PRO/DX INJ NEW DRUG ADDON: CPT

## 2024-01-04 PROCEDURE — 25010000002 DIPHENHYDRAMINE PER 50 MG: Performed by: INTERNAL MEDICINE

## 2024-01-04 PROCEDURE — 85025 COMPLETE CBC W/AUTO DIFF WBC: CPT | Performed by: INTERNAL MEDICINE

## 2024-01-04 PROCEDURE — 25010000002 HEPARIN LOCK FLUSH PER 10 UNITS: Performed by: INTERNAL MEDICINE

## 2024-01-04 PROCEDURE — 25010000002 DEXAMETHASONE SODIUM PHOSPHATE 100 MG/10ML SOLUTION: Performed by: INTERNAL MEDICINE

## 2024-01-04 PROCEDURE — 80053 COMPREHEN METABOLIC PANEL: CPT | Performed by: INTERNAL MEDICINE

## 2024-01-04 PROCEDURE — 96413 CHEMO IV INFUSION 1 HR: CPT

## 2024-01-04 PROCEDURE — 96367 TX/PROPH/DG ADDL SEQ IV INF: CPT

## 2024-01-04 PROCEDURE — 25810000003 SODIUM CHLORIDE 0.9 % SOLUTION: Performed by: INTERNAL MEDICINE

## 2024-01-04 RX ORDER — DIPHENHYDRAMINE HYDROCHLORIDE 50 MG/ML
50 INJECTION INTRAMUSCULAR; INTRAVENOUS AS NEEDED
OUTPATIENT
Start: 2024-01-18

## 2024-01-04 RX ORDER — SODIUM CHLORIDE 9 MG/ML
20 INJECTION, SOLUTION INTRAVENOUS ONCE
Status: CANCELLED | OUTPATIENT
Start: 2024-01-04

## 2024-01-04 RX ORDER — FAMOTIDINE 10 MG/ML
20 INJECTION, SOLUTION INTRAVENOUS ONCE
OUTPATIENT
Start: 2024-01-18

## 2024-01-04 RX ORDER — FAMOTIDINE 10 MG/ML
20 INJECTION, SOLUTION INTRAVENOUS AS NEEDED
OUTPATIENT
Start: 2024-01-18

## 2024-01-04 RX ORDER — FAMOTIDINE 10 MG/ML
20 INJECTION, SOLUTION INTRAVENOUS ONCE
OUTPATIENT
Start: 2024-01-25

## 2024-01-04 RX ORDER — FAMOTIDINE 10 MG/ML
20 INJECTION, SOLUTION INTRAVENOUS AS NEEDED
Status: CANCELLED | OUTPATIENT
Start: 2024-01-04

## 2024-01-04 RX ORDER — SODIUM CHLORIDE 9 MG/ML
20 INJECTION, SOLUTION INTRAVENOUS ONCE
OUTPATIENT
Start: 2024-01-25

## 2024-01-04 RX ORDER — FAMOTIDINE 10 MG/ML
20 INJECTION, SOLUTION INTRAVENOUS ONCE
OUTPATIENT
Start: 2024-01-11

## 2024-01-04 RX ORDER — DIPHENHYDRAMINE HYDROCHLORIDE 50 MG/ML
50 INJECTION INTRAMUSCULAR; INTRAVENOUS AS NEEDED
OUTPATIENT
Start: 2024-01-25

## 2024-01-04 RX ORDER — FAMOTIDINE 10 MG/ML
20 INJECTION, SOLUTION INTRAVENOUS ONCE
Status: CANCELLED | OUTPATIENT
Start: 2024-01-04

## 2024-01-04 RX ORDER — DIPHENHYDRAMINE HYDROCHLORIDE 50 MG/ML
50 INJECTION INTRAMUSCULAR; INTRAVENOUS AS NEEDED
OUTPATIENT
Start: 2024-01-11

## 2024-01-04 RX ORDER — HEPARIN SODIUM (PORCINE) LOCK FLUSH IV SOLN 100 UNIT/ML 100 UNIT/ML
500 SOLUTION INTRAVENOUS AS NEEDED
OUTPATIENT
Start: 2024-01-04

## 2024-01-04 RX ORDER — SODIUM CHLORIDE 9 MG/ML
20 INJECTION, SOLUTION INTRAVENOUS ONCE
OUTPATIENT
Start: 2024-01-18

## 2024-01-04 RX ORDER — FAMOTIDINE 10 MG/ML
20 INJECTION, SOLUTION INTRAVENOUS AS NEEDED
OUTPATIENT
Start: 2024-01-25

## 2024-01-04 RX ORDER — SODIUM CHLORIDE 0.9 % (FLUSH) 0.9 %
10 SYRINGE (ML) INJECTION AS NEEDED
OUTPATIENT
Start: 2024-01-04

## 2024-01-04 RX ORDER — FAMOTIDINE 10 MG/ML
20 INJECTION, SOLUTION INTRAVENOUS ONCE
Status: COMPLETED | OUTPATIENT
Start: 2024-01-04 | End: 2024-01-04

## 2024-01-04 RX ORDER — FAMOTIDINE 10 MG/ML
20 INJECTION, SOLUTION INTRAVENOUS AS NEEDED
OUTPATIENT
Start: 2024-01-11

## 2024-01-04 RX ORDER — SODIUM CHLORIDE 9 MG/ML
20 INJECTION, SOLUTION INTRAVENOUS ONCE
OUTPATIENT
Start: 2024-01-11

## 2024-01-04 RX ORDER — DIPHENHYDRAMINE HYDROCHLORIDE 50 MG/ML
50 INJECTION INTRAMUSCULAR; INTRAVENOUS AS NEEDED
Status: CANCELLED | OUTPATIENT
Start: 2024-01-04

## 2024-01-04 RX ORDER — SODIUM CHLORIDE 9 MG/ML
20 INJECTION, SOLUTION INTRAVENOUS ONCE
Status: COMPLETED | OUTPATIENT
Start: 2024-01-04 | End: 2024-01-04

## 2024-01-04 RX ORDER — HEPARIN SODIUM (PORCINE) LOCK FLUSH IV SOLN 100 UNIT/ML 100 UNIT/ML
500 SOLUTION INTRAVENOUS AS NEEDED
Status: DISCONTINUED | OUTPATIENT
Start: 2024-01-04 | End: 2024-01-05 | Stop reason: HOSPADM

## 2024-01-04 RX ADMIN — HEPARIN 500 UNITS: 100 SYRINGE at 11:59

## 2024-01-04 RX ADMIN — DEXAMETHASONE SODIUM PHOSPHATE 12 MG: 10 INJECTION, SOLUTION INTRAMUSCULAR; INTRAVENOUS at 09:47

## 2024-01-04 RX ADMIN — SODIUM CHLORIDE 130 MG: 9 INJECTION, SOLUTION INTRAVENOUS at 10:50

## 2024-01-04 RX ADMIN — SODIUM CHLORIDE 20 ML/HR: 9 INJECTION, SOLUTION INTRAVENOUS at 09:40

## 2024-01-04 RX ADMIN — FAMOTIDINE 20 MG: 10 INJECTION, SOLUTION INTRAVENOUS at 10:20

## 2024-01-04 RX ADMIN — DIPHENHYDRAMINE HYDROCHLORIDE 50 MG: 50 INJECTION INTRAMUSCULAR; INTRAVENOUS at 10:20

## 2024-01-04 NOTE — RESEARCH
Patient Name:  Swathi Cain  YOB: 1965  Patient Age:  58 y.o.  Patient's Sex:  female    Date of Service:  01/04/2024    Provider:                       RESEARCH NOTE                  Protocol --ICE COMPRESS: Randomized Trial of Limb Cryocompression Versus Continuous Compression Versus Low Cyclic Compression for the Prevention of Taxane-Induced Peripheral Neuropathy   NCT #[id number]     Subject ID:  [id number]  ARM 3:  Low Cyclic Compression     Participant here for C1D1 of Taxol.  Planned every week x 12 cycles.     Randomized to low cyclic compression.    Port in place.  All four extremities used for device intervention.  Upon assessment, there were no signs of redness, open wounds or break in skin integrity prior to start of device intervention.  Participant tolerated device intervention without any adjustments. No AEs observed.     Prior to start of treatment, neuroassessments completed as well as the Timed Get Up and Go evaluation.  Study blood specimens also collected and processed per protocol prior to start of treatment.     RTC in 1 week for #2/12 Taxane infusions and device intervention.       Thank you.  Rosio Moise, RN, BSN, CRC

## 2024-01-04 NOTE — PROGRESS NOTES
Onc Nutrition    Patient: Swathi Cain  YOB: 1965    Diagnosis: clinical stage T1 cN0 M0 triple negative high-grade invasive ductal carcinoma of the left breast     Surgery: once neoadjuvant chemo is completed      Neoadjuvant Chemotherapy: OP Breast DD AC DOXOrubicin / Cyclophosphamide - 4/4 cycles completed 12/21/23 followed by PACLitaxel - every 7 days x 12 cycles (cycle 1)    Weight - 125# / stable     Follow up with patient during her infusion appointment.  Note patient has completed her first portion of chemo and is starting on the second portion today.  She reports tolerating treatment well thus far.  She states her appetite and oral intake have been normal and she denies nutritional complaints at this time.    Reviewed the importance of good nutrition during her treatment course.  Advised her to be consuming smaller more frequent snacks throughout the day to aid with potential nausea management.  Discussed the importance of protein, reviewed high protein foods, and recommended she include protein at each meal / snack.  Also reviewed the importance of hydration and recommended she continue to sip on water throughout the day with a goal of ~64 ounces daily.      She denies nutritional questions at this time.  Advised her to contact RD if questions arise.  She voiced understanding of information discussed.  Will follow up as indicated.     Vandana Chow RD  01/04/24

## 2024-01-11 ENCOUNTER — RESEARCH ENCOUNTER (OUTPATIENT)
Dept: OTHER | Facility: OTHER | Age: 59
End: 2024-01-11
Payer: COMMERCIAL

## 2024-01-11 ENCOUNTER — APPOINTMENT (OUTPATIENT)
Dept: ONCOLOGY | Facility: HOSPITAL | Age: 59
End: 2024-01-11
Payer: COMMERCIAL

## 2024-01-11 ENCOUNTER — HOSPITAL ENCOUNTER (OUTPATIENT)
Dept: ONCOLOGY | Facility: HOSPITAL | Age: 59
Discharge: HOME | End: 2024-01-11

## 2024-01-11 ENCOUNTER — HOSPITAL ENCOUNTER (OUTPATIENT)
Dept: ONCOLOGY | Facility: HOSPITAL | Age: 59
Discharge: HOME | End: 2024-01-11
Admitting: INTERNAL MEDICINE
Payer: COMMERCIAL

## 2024-01-11 VITALS
RESPIRATION RATE: 18 BRPM | WEIGHT: 124 LBS | DIASTOLIC BLOOD PRESSURE: 63 MMHG | HEART RATE: 89 BPM | HEIGHT: 65 IN | BODY MASS INDEX: 20.66 KG/M2 | TEMPERATURE: 97.5 F | SYSTOLIC BLOOD PRESSURE: 106 MMHG

## 2024-01-11 DIAGNOSIS — Z17.1 MALIGNANT NEOPLASM OF OVERLAPPING SITES OF LEFT BREAST IN FEMALE, ESTROGEN RECEPTOR NEGATIVE: Primary | ICD-10-CM

## 2024-01-11 DIAGNOSIS — C50.812 MALIGNANT NEOPLASM OF OVERLAPPING SITES OF LEFT BREAST IN FEMALE, ESTROGEN RECEPTOR NEGATIVE: Primary | ICD-10-CM

## 2024-01-11 LAB
ALBUMIN SERPL-MCNC: 4.3 G/DL (ref 3.5–5.2)
ALBUMIN/GLOB SERPL: 1.9 G/DL
ALP SERPL-CCNC: 81 U/L (ref 39–117)
ALT SERPL W P-5'-P-CCNC: 34 U/L (ref 1–33)
ANION GAP SERPL CALCULATED.3IONS-SCNC: 9 MMOL/L (ref 5–15)
AST SERPL-CCNC: 36 U/L (ref 1–32)
BASOPHILS # BLD AUTO: 0.06 10*3/MM3 (ref 0–0.2)
BASOPHILS NFR BLD AUTO: 1.1 % (ref 0–1.5)
BILIRUB SERPL-MCNC: 0.2 MG/DL (ref 0–1.2)
BUN SERPL-MCNC: 11 MG/DL (ref 6–20)
BUN/CREAT SERPL: 16.9 (ref 7–25)
CALCIUM SPEC-SCNC: 9.2 MG/DL (ref 8.6–10.5)
CHLORIDE SERPL-SCNC: 106 MMOL/L (ref 98–107)
CO2 SERPL-SCNC: 25 MMOL/L (ref 22–29)
CREAT SERPL-MCNC: 0.65 MG/DL (ref 0.57–1)
DEPRECATED RDW RBC AUTO: 70.1 FL (ref 37–54)
EGFRCR SERPLBLD CKD-EPI 2021: 103.5 ML/MIN/1.73
EOSINOPHIL # BLD AUTO: 0.09 10*3/MM3 (ref 0–0.4)
EOSINOPHIL NFR BLD AUTO: 1.6 % (ref 0.3–6.2)
ERYTHROCYTE [DISTWIDTH] IN BLOOD BY AUTOMATED COUNT: 18.9 % (ref 12.3–15.4)
GLOBULIN UR ELPH-MCNC: 2.3 GM/DL
GLUCOSE SERPL-MCNC: 95 MG/DL (ref 65–99)
HCT VFR BLD AUTO: 27.2 % (ref 34–46.6)
HGB BLD-MCNC: 9.2 G/DL (ref 12–15.9)
IMM GRANULOCYTES # BLD AUTO: 0.03 10*3/MM3 (ref 0–0.05)
IMM GRANULOCYTES NFR BLD AUTO: 0.5 % (ref 0–0.5)
LYMPHOCYTES # BLD AUTO: 0.98 10*3/MM3 (ref 0.7–3.1)
LYMPHOCYTES NFR BLD AUTO: 17.9 % (ref 19.6–45.3)
MCH RBC QN AUTO: 35.8 PG (ref 26.6–33)
MCHC RBC AUTO-ENTMCNC: 33.8 G/DL (ref 31.5–35.7)
MCV RBC AUTO: 105.8 FL (ref 79–97)
MONOCYTES # BLD AUTO: 0.73 10*3/MM3 (ref 0.1–0.9)
MONOCYTES NFR BLD AUTO: 13.3 % (ref 5–12)
NEUTROPHILS NFR BLD AUTO: 3.58 10*3/MM3 (ref 1.7–7)
NEUTROPHILS NFR BLD AUTO: 65.6 % (ref 42.7–76)
PLATELET # BLD AUTO: 242 10*3/MM3 (ref 140–450)
PMV BLD AUTO: 8.9 FL (ref 6–12)
POTASSIUM SERPL-SCNC: 4.1 MMOL/L (ref 3.5–5.2)
PROT SERPL-MCNC: 6.6 G/DL (ref 6–8.5)
RBC # BLD AUTO: 2.57 10*6/MM3 (ref 3.77–5.28)
SODIUM SERPL-SCNC: 140 MMOL/L (ref 136–145)
WBC NRBC COR # BLD AUTO: 5.47 10*3/MM3 (ref 3.4–10.8)

## 2024-01-11 PROCEDURE — 25810000003 SODIUM CHLORIDE 0.9 % SOLUTION 250 ML FLEX CONT: Performed by: INTERNAL MEDICINE

## 2024-01-11 PROCEDURE — 96413 CHEMO IV INFUSION 1 HR: CPT

## 2024-01-11 PROCEDURE — 25010000002 HEPARIN LOCK FLUSH PER 10 UNITS: Performed by: INTERNAL MEDICINE

## 2024-01-11 PROCEDURE — 85025 COMPLETE CBC W/AUTO DIFF WBC: CPT | Performed by: INTERNAL MEDICINE

## 2024-01-11 PROCEDURE — 96375 TX/PRO/DX INJ NEW DRUG ADDON: CPT

## 2024-01-11 PROCEDURE — 96367 TX/PROPH/DG ADDL SEQ IV INF: CPT

## 2024-01-11 PROCEDURE — 80053 COMPREHEN METABOLIC PANEL: CPT | Performed by: INTERNAL MEDICINE

## 2024-01-11 PROCEDURE — 25010000002 PACLITAXEL PER 1 MG: Performed by: INTERNAL MEDICINE

## 2024-01-11 PROCEDURE — 25810000003 SODIUM CHLORIDE 0.9 % SOLUTION: Performed by: INTERNAL MEDICINE

## 2024-01-11 PROCEDURE — 25010000002 DIPHENHYDRAMINE PER 50 MG: Performed by: INTERNAL MEDICINE

## 2024-01-11 PROCEDURE — 25010000002 DEXAMETHASONE SODIUM PHOSPHATE 100 MG/10ML SOLUTION: Performed by: INTERNAL MEDICINE

## 2024-01-11 RX ORDER — SODIUM CHLORIDE 0.9 % (FLUSH) 0.9 %
10 SYRINGE (ML) INJECTION AS NEEDED
OUTPATIENT
Start: 2024-01-11

## 2024-01-11 RX ORDER — HEPARIN SODIUM (PORCINE) LOCK FLUSH IV SOLN 100 UNIT/ML 100 UNIT/ML
500 SOLUTION INTRAVENOUS AS NEEDED
OUTPATIENT
Start: 2024-01-11

## 2024-01-11 RX ORDER — FAMOTIDINE 10 MG/ML
20 INJECTION, SOLUTION INTRAVENOUS ONCE
Status: COMPLETED | OUTPATIENT
Start: 2024-01-11 | End: 2024-01-11

## 2024-01-11 RX ORDER — HEPARIN SODIUM (PORCINE) LOCK FLUSH IV SOLN 100 UNIT/ML 100 UNIT/ML
500 SOLUTION INTRAVENOUS AS NEEDED
Status: DISCONTINUED | OUTPATIENT
Start: 2024-01-11 | End: 2024-01-12 | Stop reason: HOSPADM

## 2024-01-11 RX ORDER — SODIUM CHLORIDE 9 MG/ML
20 INJECTION, SOLUTION INTRAVENOUS ONCE
Status: COMPLETED | OUTPATIENT
Start: 2024-01-11 | End: 2024-01-11

## 2024-01-11 RX ADMIN — DIPHENHYDRAMINE HYDROCHLORIDE 25 MG: 50 INJECTION INTRAMUSCULAR; INTRAVENOUS at 11:45

## 2024-01-11 RX ADMIN — SODIUM CHLORIDE 130 MG: 9 INJECTION, SOLUTION INTRAVENOUS at 12:30

## 2024-01-11 RX ADMIN — FAMOTIDINE 20 MG: 10 INJECTION, SOLUTION INTRAVENOUS at 11:45

## 2024-01-11 RX ADMIN — DEXAMETHASONE SODIUM PHOSPHATE 12 MG: 10 INJECTION, SOLUTION INTRAMUSCULAR; INTRAVENOUS at 11:26

## 2024-01-11 RX ADMIN — SODIUM CHLORIDE 20 ML/HR: 9 INJECTION, SOLUTION INTRAVENOUS at 11:26

## 2024-01-11 RX ADMIN — HEPARIN 500 UNITS: 100 SYRINGE at 14:20

## 2024-01-11 NOTE — RESEARCH
Patient Name:  Swathi Cain  YOB: 1965  Patient Age:  58 y.o.  Patient's Sex:  female    Date of Service:  01/11/2024    Provider:  ADAM Arreola MD                     RESEARCH NOTE                  Protocol --ICE COMPRESS: Randomized Trial of Limb Cryocompression Versus Continuous Compression Versus Low Cyclic Compression for the Prevention of Taxane-Induced Peripheral Neuropathy   NCT #[id number]     Subject ID:  [id number]  ARM 3:  Low Cyclic Compression     Participant here for C2D1 of Taxol.  Planned every week x 12 cycles.     Randomized to low cyclic compression.    Port in place.  All four extremities used for device intervention.  Upon assessment, there were no signs of redness, open wounds or break in skin integrity prior to start of device intervention.  Participant tolerated device intervention without any adjustments. No AEs observed.        RTC in 1 week for #3/12 Taxane infusions and device intervention.       Thank you.  Rosio Moise, RN, BSN, CRC

## 2024-01-18 ENCOUNTER — HOSPITAL ENCOUNTER (OUTPATIENT)
Dept: ONCOLOGY | Facility: HOSPITAL | Age: 59
Discharge: HOME | End: 2024-01-18
Admitting: INTERNAL MEDICINE
Payer: COMMERCIAL

## 2024-01-18 ENCOUNTER — RESEARCH ENCOUNTER (OUTPATIENT)
Dept: OTHER | Facility: OTHER | Age: 59
End: 2024-01-18
Payer: COMMERCIAL

## 2024-01-18 VITALS
SYSTOLIC BLOOD PRESSURE: 105 MMHG | WEIGHT: 125 LBS | RESPIRATION RATE: 16 BRPM | HEIGHT: 65 IN | BODY MASS INDEX: 20.83 KG/M2 | HEART RATE: 94 BPM | TEMPERATURE: 99 F | DIASTOLIC BLOOD PRESSURE: 64 MMHG

## 2024-01-18 DIAGNOSIS — C50.812 MALIGNANT NEOPLASM OF OVERLAPPING SITES OF LEFT BREAST IN FEMALE, ESTROGEN RECEPTOR NEGATIVE: Primary | ICD-10-CM

## 2024-01-18 DIAGNOSIS — Z17.1 MALIGNANT NEOPLASM OF OVERLAPPING SITES OF LEFT BREAST IN FEMALE, ESTROGEN RECEPTOR NEGATIVE: Primary | ICD-10-CM

## 2024-01-18 LAB
ALBUMIN SERPL-MCNC: 4.2 G/DL (ref 3.5–5.2)
ALBUMIN/GLOB SERPL: 2.6 G/DL
ALP SERPL-CCNC: 72 U/L (ref 39–117)
ALT SERPL W P-5'-P-CCNC: 33 U/L (ref 1–33)
ANION GAP SERPL CALCULATED.3IONS-SCNC: 8 MMOL/L (ref 5–15)
AST SERPL-CCNC: 27 U/L (ref 1–32)
BASOPHILS # BLD AUTO: 0.04 10*3/MM3 (ref 0–0.2)
BASOPHILS NFR BLD AUTO: 1 % (ref 0–1.5)
BILIRUB SERPL-MCNC: 0.3 MG/DL (ref 0–1.2)
BUN SERPL-MCNC: 10 MG/DL (ref 6–20)
BUN/CREAT SERPL: 17.2 (ref 7–25)
CALCIUM SPEC-SCNC: 9.1 MG/DL (ref 8.6–10.5)
CHLORIDE SERPL-SCNC: 106 MMOL/L (ref 98–107)
CO2 SERPL-SCNC: 25 MMOL/L (ref 22–29)
CREAT SERPL-MCNC: 0.58 MG/DL (ref 0.57–1)
DEPRECATED RDW RBC AUTO: 73.2 FL (ref 37–54)
EGFRCR SERPLBLD CKD-EPI 2021: 106.4 ML/MIN/1.73
EOSINOPHIL # BLD AUTO: 0.27 10*3/MM3 (ref 0–0.4)
EOSINOPHIL NFR BLD AUTO: 7 % (ref 0.3–6.2)
ERYTHROCYTE [DISTWIDTH] IN BLOOD BY AUTOMATED COUNT: 19.1 % (ref 12.3–15.4)
GLOBULIN UR ELPH-MCNC: 1.6 GM/DL
GLUCOSE SERPL-MCNC: 86 MG/DL (ref 65–99)
HCT VFR BLD AUTO: 27 % (ref 34–46.6)
HGB BLD-MCNC: 9 G/DL (ref 12–15.9)
IMM GRANULOCYTES # BLD AUTO: 0.01 10*3/MM3 (ref 0–0.05)
IMM GRANULOCYTES NFR BLD AUTO: 0.3 % (ref 0–0.5)
LYMPHOCYTES # BLD AUTO: 0.7 10*3/MM3 (ref 0.7–3.1)
LYMPHOCYTES NFR BLD AUTO: 18.3 % (ref 19.6–45.3)
MCH RBC QN AUTO: 35.7 PG (ref 26.6–33)
MCHC RBC AUTO-ENTMCNC: 33.3 G/DL (ref 31.5–35.7)
MCV RBC AUTO: 107.1 FL (ref 79–97)
MONOCYTES # BLD AUTO: 0.56 10*3/MM3 (ref 0.1–0.9)
MONOCYTES NFR BLD AUTO: 14.6 % (ref 5–12)
NEUTROPHILS NFR BLD AUTO: 2.25 10*3/MM3 (ref 1.7–7)
NEUTROPHILS NFR BLD AUTO: 58.8 % (ref 42.7–76)
PLATELET # BLD AUTO: 166 10*3/MM3 (ref 140–450)
PMV BLD AUTO: 9.4 FL (ref 6–12)
POTASSIUM SERPL-SCNC: 4.2 MMOL/L (ref 3.5–5.2)
PROT SERPL-MCNC: 5.8 G/DL (ref 6–8.5)
RBC # BLD AUTO: 2.52 10*6/MM3 (ref 3.77–5.28)
SODIUM SERPL-SCNC: 139 MMOL/L (ref 136–145)
WBC NRBC COR # BLD AUTO: 3.83 10*3/MM3 (ref 3.4–10.8)

## 2024-01-18 PROCEDURE — 25010000002 HEPARIN LOCK FLUSH PER 10 UNITS: Performed by: INTERNAL MEDICINE

## 2024-01-18 PROCEDURE — 25010000002 DEXAMETHASONE SODIUM PHOSPHATE 100 MG/10ML SOLUTION: Performed by: INTERNAL MEDICINE

## 2024-01-18 PROCEDURE — 25810000003 SODIUM CHLORIDE 0.9 % SOLUTION 250 ML FLEX CONT: Performed by: INTERNAL MEDICINE

## 2024-01-18 PROCEDURE — 96375 TX/PRO/DX INJ NEW DRUG ADDON: CPT

## 2024-01-18 PROCEDURE — 85025 COMPLETE CBC W/AUTO DIFF WBC: CPT | Performed by: INTERNAL MEDICINE

## 2024-01-18 PROCEDURE — 96413 CHEMO IV INFUSION 1 HR: CPT

## 2024-01-18 PROCEDURE — 80053 COMPREHEN METABOLIC PANEL: CPT | Performed by: INTERNAL MEDICINE

## 2024-01-18 PROCEDURE — 25810000003 SODIUM CHLORIDE 0.9 % SOLUTION: Performed by: INTERNAL MEDICINE

## 2024-01-18 PROCEDURE — 25010000002 PACLITAXEL PER 1 MG: Performed by: INTERNAL MEDICINE

## 2024-01-18 PROCEDURE — 25010000002 DIPHENHYDRAMINE PER 50 MG: Performed by: INTERNAL MEDICINE

## 2024-01-18 RX ORDER — SODIUM CHLORIDE 0.9 % (FLUSH) 0.9 %
10 SYRINGE (ML) INJECTION AS NEEDED
OUTPATIENT
Start: 2024-01-18

## 2024-01-18 RX ORDER — HEPARIN SODIUM (PORCINE) LOCK FLUSH IV SOLN 100 UNIT/ML 100 UNIT/ML
500 SOLUTION INTRAVENOUS AS NEEDED
Status: DISCONTINUED | OUTPATIENT
Start: 2024-01-18 | End: 2024-01-19 | Stop reason: HOSPADM

## 2024-01-18 RX ORDER — FAMOTIDINE 10 MG/ML
20 INJECTION, SOLUTION INTRAVENOUS ONCE
Status: COMPLETED | OUTPATIENT
Start: 2024-01-18 | End: 2024-01-18

## 2024-01-18 RX ORDER — SODIUM CHLORIDE 9 MG/ML
20 INJECTION, SOLUTION INTRAVENOUS ONCE
Status: COMPLETED | OUTPATIENT
Start: 2024-01-18 | End: 2024-01-18

## 2024-01-18 RX ORDER — HEPARIN SODIUM (PORCINE) LOCK FLUSH IV SOLN 100 UNIT/ML 100 UNIT/ML
500 SOLUTION INTRAVENOUS AS NEEDED
OUTPATIENT
Start: 2024-01-18

## 2024-01-18 RX ADMIN — DIPHENHYDRAMINE HYDROCHLORIDE 25 MG: 50 INJECTION INTRAMUSCULAR; INTRAVENOUS at 11:08

## 2024-01-18 RX ADMIN — SODIUM CHLORIDE 20 ML/HR: 9 INJECTION, SOLUTION INTRAVENOUS at 11:03

## 2024-01-18 RX ADMIN — HEPARIN 500 UNITS: 100 SYRINGE at 13:58

## 2024-01-18 RX ADMIN — DEXAMETHASONE SODIUM PHOSPHATE 12 MG: 10 INJECTION, SOLUTION INTRAMUSCULAR; INTRAVENOUS at 11:36

## 2024-01-18 RX ADMIN — FAMOTIDINE 20 MG: 10 INJECTION, SOLUTION INTRAVENOUS at 11:04

## 2024-01-18 RX ADMIN — PACLITAXEL 130 MG: 6 INJECTION, SOLUTION INTRAVENOUS at 12:33

## 2024-01-18 NOTE — RESEARCH
Patient Name:  Swathi Cain  YOB: 1965  Patient Age:  58 y.o.  Patient's Sex:  female    Date of Service:  01/18/2024    Provider:  ADAM Arreola MD                     RESEARCH NOTE                  Protocol --ICE COMPRESS: Randomized Trial of Limb Cryocompression Versus Continuous Compression Versus Low Cyclic Compression for the Prevention of Taxane-Induced Peripheral Neuropathy   NCT #[id number]     Subject ID:  [id number]  ARM 3:  Low Cyclic Compression     Participant here for C3D1 of Taxol.  Planned every week x 12 cycles.     Randomized to low cyclic compression.    Port in place.  All four extremities used for device intervention.  Upon assessment, there were no signs of redness, open wounds or break in skin integrity prior to start of device intervention.  Participant tolerated device intervention without any adjustments. No AEs observed.        RTC in 1 week for #4/12 Taxane infusions and device intervention.       Thank you.  Rosio Moise, RN, BSN, CRC

## 2024-01-25 ENCOUNTER — RESEARCH ENCOUNTER (OUTPATIENT)
Dept: OTHER | Facility: OTHER | Age: 59
End: 2024-01-25
Payer: COMMERCIAL

## 2024-01-25 ENCOUNTER — HOSPITAL ENCOUNTER (OUTPATIENT)
Dept: ONCOLOGY | Facility: HOSPITAL | Age: 59
Discharge: HOME | End: 2024-01-25
Payer: COMMERCIAL

## 2024-01-25 ENCOUNTER — OFFICE VISIT (OUTPATIENT)
Dept: ONCOLOGY | Facility: CLINIC | Age: 59
End: 2024-01-25
Payer: COMMERCIAL

## 2024-01-25 VITALS
SYSTOLIC BLOOD PRESSURE: 135 MMHG | HEART RATE: 117 BPM | RESPIRATION RATE: 18 BRPM | WEIGHT: 127 LBS | HEIGHT: 65 IN | OXYGEN SATURATION: 99 % | BODY MASS INDEX: 21.16 KG/M2 | DIASTOLIC BLOOD PRESSURE: 59 MMHG | TEMPERATURE: 97.2 F

## 2024-01-25 VITALS — HEART RATE: 95 BPM | DIASTOLIC BLOOD PRESSURE: 60 MMHG | SYSTOLIC BLOOD PRESSURE: 121 MMHG

## 2024-01-25 DIAGNOSIS — Z17.1 MALIGNANT NEOPLASM OF OVERLAPPING SITES OF LEFT BREAST IN FEMALE, ESTROGEN RECEPTOR NEGATIVE: Primary | ICD-10-CM

## 2024-01-25 DIAGNOSIS — C50.812 MALIGNANT NEOPLASM OF OVERLAPPING SITES OF LEFT BREAST IN FEMALE, ESTROGEN RECEPTOR NEGATIVE: Primary | ICD-10-CM

## 2024-01-25 LAB
ALBUMIN SERPL-MCNC: 3.9 G/DL (ref 3.5–5.2)
ALBUMIN/GLOB SERPL: 1.6 G/DL
ALP SERPL-CCNC: 65 U/L (ref 39–117)
ALT SERPL W P-5'-P-CCNC: 34 U/L (ref 1–33)
ANION GAP SERPL CALCULATED.3IONS-SCNC: 10 MMOL/L (ref 5–15)
AST SERPL-CCNC: 29 U/L (ref 1–32)
BASOPHILS # BLD AUTO: 0.04 10*3/MM3 (ref 0–0.2)
BASOPHILS NFR BLD AUTO: 0.9 % (ref 0–1.5)
BILIRUB SERPL-MCNC: 0.2 MG/DL (ref 0–1.2)
BUN SERPL-MCNC: 10 MG/DL (ref 6–20)
BUN/CREAT SERPL: 15.9 (ref 7–25)
CALCIUM SPEC-SCNC: 9.1 MG/DL (ref 8.6–10.5)
CHLORIDE SERPL-SCNC: 106 MMOL/L (ref 98–107)
CO2 SERPL-SCNC: 24 MMOL/L (ref 22–29)
CREAT SERPL-MCNC: 0.63 MG/DL (ref 0.57–1)
DEPRECATED RDW RBC AUTO: 75.2 FL (ref 37–54)
EGFRCR SERPLBLD CKD-EPI 2021: 104.3 ML/MIN/1.73
EOSINOPHIL # BLD AUTO: 0.32 10*3/MM3 (ref 0–0.4)
EOSINOPHIL NFR BLD AUTO: 7.2 % (ref 0.3–6.2)
ERYTHROCYTE [DISTWIDTH] IN BLOOD BY AUTOMATED COUNT: 18.7 % (ref 12.3–15.4)
GLOBULIN UR ELPH-MCNC: 2.4 GM/DL
GLUCOSE SERPL-MCNC: 113 MG/DL (ref 65–99)
HCT VFR BLD AUTO: 27.7 % (ref 34–46.6)
HGB BLD-MCNC: 9.1 G/DL (ref 12–15.9)
IMM GRANULOCYTES # BLD AUTO: 0.01 10*3/MM3 (ref 0–0.05)
IMM GRANULOCYTES NFR BLD AUTO: 0.2 % (ref 0–0.5)
LYMPHOCYTES # BLD AUTO: 0.69 10*3/MM3 (ref 0.7–3.1)
LYMPHOCYTES NFR BLD AUTO: 15.5 % (ref 19.6–45.3)
MCH RBC QN AUTO: 36.3 PG (ref 26.6–33)
MCHC RBC AUTO-ENTMCNC: 32.9 G/DL (ref 31.5–35.7)
MCV RBC AUTO: 110.4 FL (ref 79–97)
MONOCYTES # BLD AUTO: 0.51 10*3/MM3 (ref 0.1–0.9)
MONOCYTES NFR BLD AUTO: 11.5 % (ref 5–12)
NEUTROPHILS NFR BLD AUTO: 2.88 10*3/MM3 (ref 1.7–7)
NEUTROPHILS NFR BLD AUTO: 64.7 % (ref 42.7–76)
PLATELET # BLD AUTO: 190 10*3/MM3 (ref 140–450)
PMV BLD AUTO: 9.5 FL (ref 6–12)
POTASSIUM SERPL-SCNC: 4.1 MMOL/L (ref 3.5–5.2)
PROT SERPL-MCNC: 6.3 G/DL (ref 6–8.5)
RBC # BLD AUTO: 2.51 10*6/MM3 (ref 3.77–5.28)
SODIUM SERPL-SCNC: 140 MMOL/L (ref 136–145)
WBC NRBC COR # BLD AUTO: 4.45 10*3/MM3 (ref 3.4–10.8)

## 2024-01-25 PROCEDURE — 99213 OFFICE O/P EST LOW 20 MIN: CPT | Performed by: NURSE PRACTITIONER

## 2024-01-25 PROCEDURE — 25010000002 DIPHENHYDRAMINE PER 50 MG: Performed by: INTERNAL MEDICINE

## 2024-01-25 PROCEDURE — 85025 COMPLETE CBC W/AUTO DIFF WBC: CPT | Performed by: INTERNAL MEDICINE

## 2024-01-25 PROCEDURE — 96375 TX/PRO/DX INJ NEW DRUG ADDON: CPT

## 2024-01-25 PROCEDURE — 25010000002 DEXAMETHASONE SODIUM PHOSPHATE 100 MG/10ML SOLUTION: Performed by: INTERNAL MEDICINE

## 2024-01-25 PROCEDURE — 25810000003 SODIUM CHLORIDE 0.9 % SOLUTION: Performed by: INTERNAL MEDICINE

## 2024-01-25 PROCEDURE — 25010000002 PACLITAXEL PER 1 MG: Performed by: INTERNAL MEDICINE

## 2024-01-25 PROCEDURE — 80053 COMPREHEN METABOLIC PANEL: CPT | Performed by: INTERNAL MEDICINE

## 2024-01-25 PROCEDURE — 25810000003 SODIUM CHLORIDE 0.9 % SOLUTION 250 ML FLEX CONT: Performed by: INTERNAL MEDICINE

## 2024-01-25 PROCEDURE — 96413 CHEMO IV INFUSION 1 HR: CPT

## 2024-01-25 PROCEDURE — 25010000002 HEPARIN LOCK FLUSH PER 10 UNITS: Performed by: INTERNAL MEDICINE

## 2024-01-25 RX ORDER — SODIUM CHLORIDE 0.9 % (FLUSH) 0.9 %
10 SYRINGE (ML) INJECTION AS NEEDED
OUTPATIENT
Start: 2024-01-25

## 2024-01-25 RX ORDER — FAMOTIDINE 10 MG/ML
20 INJECTION, SOLUTION INTRAVENOUS AS NEEDED
OUTPATIENT
Start: 2024-02-15

## 2024-01-25 RX ORDER — HEPARIN SODIUM (PORCINE) LOCK FLUSH IV SOLN 100 UNIT/ML 100 UNIT/ML
500 SOLUTION INTRAVENOUS AS NEEDED
Status: DISCONTINUED | OUTPATIENT
Start: 2024-01-25 | End: 2024-01-27 | Stop reason: HOSPADM

## 2024-01-25 RX ORDER — DIPHENHYDRAMINE HYDROCHLORIDE 50 MG/ML
50 INJECTION INTRAMUSCULAR; INTRAVENOUS AS NEEDED
OUTPATIENT
Start: 2024-02-01

## 2024-01-25 RX ORDER — FAMOTIDINE 10 MG/ML
20 INJECTION, SOLUTION INTRAVENOUS AS NEEDED
OUTPATIENT
Start: 2024-02-08

## 2024-01-25 RX ORDER — SODIUM CHLORIDE 9 MG/ML
20 INJECTION, SOLUTION INTRAVENOUS ONCE
OUTPATIENT
Start: 2024-02-15

## 2024-01-25 RX ORDER — FAMOTIDINE 10 MG/ML
20 INJECTION, SOLUTION INTRAVENOUS ONCE
OUTPATIENT
Start: 2024-02-15

## 2024-01-25 RX ORDER — HEPARIN SODIUM (PORCINE) LOCK FLUSH IV SOLN 100 UNIT/ML 100 UNIT/ML
500 SOLUTION INTRAVENOUS AS NEEDED
OUTPATIENT
Start: 2024-01-25

## 2024-01-25 RX ORDER — DIPHENHYDRAMINE HYDROCHLORIDE 50 MG/ML
50 INJECTION INTRAMUSCULAR; INTRAVENOUS AS NEEDED
OUTPATIENT
Start: 2024-02-08

## 2024-01-25 RX ORDER — SODIUM CHLORIDE 9 MG/ML
20 INJECTION, SOLUTION INTRAVENOUS ONCE
OUTPATIENT
Start: 2024-02-08

## 2024-01-25 RX ORDER — FAMOTIDINE 10 MG/ML
20 INJECTION, SOLUTION INTRAVENOUS ONCE
OUTPATIENT
Start: 2024-02-08

## 2024-01-25 RX ORDER — FAMOTIDINE 10 MG/ML
20 INJECTION, SOLUTION INTRAVENOUS AS NEEDED
OUTPATIENT
Start: 2024-02-01

## 2024-01-25 RX ORDER — FAMOTIDINE 10 MG/ML
20 INJECTION, SOLUTION INTRAVENOUS ONCE
OUTPATIENT
Start: 2024-02-01

## 2024-01-25 RX ORDER — SODIUM CHLORIDE 9 MG/ML
20 INJECTION, SOLUTION INTRAVENOUS ONCE
OUTPATIENT
Start: 2024-02-01

## 2024-01-25 RX ORDER — FAMOTIDINE 10 MG/ML
20 INJECTION, SOLUTION INTRAVENOUS ONCE
Status: COMPLETED | OUTPATIENT
Start: 2024-01-25 | End: 2024-01-25

## 2024-01-25 RX ORDER — SODIUM CHLORIDE 9 MG/ML
20 INJECTION, SOLUTION INTRAVENOUS ONCE
Status: COMPLETED | OUTPATIENT
Start: 2024-01-25 | End: 2024-01-25

## 2024-01-25 RX ORDER — DIPHENHYDRAMINE HYDROCHLORIDE 50 MG/ML
50 INJECTION INTRAMUSCULAR; INTRAVENOUS AS NEEDED
OUTPATIENT
Start: 2024-02-15

## 2024-01-25 RX ADMIN — DIPHENHYDRAMINE HYDROCHLORIDE 25 MG: 50 INJECTION INTRAMUSCULAR; INTRAVENOUS at 10:10

## 2024-01-25 RX ADMIN — FAMOTIDINE 20 MG: 10 INJECTION, SOLUTION INTRAVENOUS at 10:08

## 2024-01-25 RX ADMIN — SODIUM CHLORIDE 130 MG: 9 INJECTION, SOLUTION INTRAVENOUS at 11:12

## 2024-01-25 RX ADMIN — DEXAMETHASONE SODIUM PHOSPHATE 12 MG: 10 INJECTION, SOLUTION INTRAMUSCULAR; INTRAVENOUS at 10:10

## 2024-01-25 RX ADMIN — HEPARIN 500 UNITS: 100 SYRINGE at 12:59

## 2024-01-25 RX ADMIN — SODIUM CHLORIDE 20 ML/HR: 9 INJECTION, SOLUTION INTRAVENOUS at 10:04

## 2024-01-25 NOTE — RESEARCH
Patient Name:  Swathi Cain  YOB: 1965  Patient Age:  58 y.o.  Patient's Sex:  female    Date of Service:  01/25/2024    Provider:  ADAM Arreola MD                     RESEARCH NOTE                  Protocol --ICE COMPRESS: Randomized Trial of Limb Cryocompression Versus Continuous Compression Versus Low Cyclic Compression for the Prevention of Taxane-Induced Peripheral Neuropathy   NCT #[id number]     Subject ID:  [id number]  ARM 3:  Low Cyclic Compression     Participant here for C4D1 of Taxol.  Planned every week x 12 cycles.     Randomized to low cyclic compression.    Port in place.  All four extremities used for device intervention.  Upon assessment, there were no signs of redness, open wounds or break in skin integrity prior to start of device intervention.  Participant tolerated device intervention without any adjustments. No AEs observed.        RTC in 1 week for #5/12 Taxane infusions and device intervention.       Thank you.  Rosio Moise, RN, BSN, CRC

## 2024-01-25 NOTE — PROGRESS NOTES
"      PROBLEM LIST:  [id number] triple negative (her2 0+) invasive ductal carcinoma of the left breast  Biopsy of a 1.2 cm left breast mass on 10/4/23.  Pathology showed a high grade IDC.  Neoadjuvant chemotherapy with ddAC followed by taxol started 11/9/23    Subjective     CHIEF COMPLAINT: breast cancer    HISTORY OF PRESENT ILLNESS:   Swathi Cain returns for follow-up.     She has completed dose dense AC and 3 cycles of weekly Taxol.  She is tolerating the Taxol very well.  She denies any peripheral neuropathy.  No nausea or vomiting.  No body aches.  She has some mild fatigue but continues to work.            Objective      /59   Pulse 117   Temp 97.2 °F (36.2 °C)   Resp 18   Ht 165.1 cm (65\")   Wt 57.6 kg (127 lb)   SpO2 99%   BMI 21.13 kg/m²   Vitals:    01/25/24 0911   PainSc: 0-No pain                 ECOG score: 0             General: well appearing female in no acute distress  Neuro: alert and oriented  HEENT: sclera anicteric, oropharynx clear  Breast: In the left breast at 3:00 there is no palpable mass noted  Extremeties: no lower extremity edema  Skin: no rashes, lesions, bruising, or petechiae  Psych: mood and affect appropriate            RECENT LABS:  Lab Results   Component Value Date    WBC 3.83 01/18/2024    HGB 9.0 (L) 01/18/2024    HCT 27.0 (L) 01/18/2024    .1 (H) 01/18/2024     01/18/2024       Lab Results   Component Value Date    GLUCOSE 86 01/18/2024    BUN 10 01/18/2024    CREATININE 0.58 01/18/2024    BCR 17.2 01/18/2024    K 4.2 01/18/2024    CO2 25.0 01/18/2024    CALCIUM 9.1 01/18/2024    ALBUMIN 4.2 01/18/2024    AST 27 01/18/2024    ALT 33 01/18/2024                 ASSESSMENT AND PLAN:     Swathi Cain is a 58 y.o. female with a T1 cN0 M0 triple negative invasive ductal carcinoma of the left breast.    She has completed 3 cycles of weekly Taxol.  She is tolerating the Taxol well.  CBC from today was reviewed and is stable.  We will proceed with cycle 4 " today unchanged.    She continues to be a part of the cryo compression study.    She is still undecided about mastectomy versus lumpectomy.    Follow-up in 3 weeks.                    Sally Arango APRN  [facility]    1/25/2024          CC:

## 2024-01-26 ENCOUNTER — TELEPHONE (OUTPATIENT)
Dept: ONCOLOGY | Facility: CLINIC | Age: 59
End: 2024-01-26
Payer: COMMERCIAL

## 2024-01-26 NOTE — TELEPHONE ENCOUNTER
Caller: Swathi Cain    Relationship to patient: Self    Best call back number: [phone]    Chief complaint: PT CALLED TO RESCHEDULE     Type of visit: LAB FOLLOW UP 1 AND INFUSION     Requested date: 2-16-24 STARTING AT 9 OR 10 AM     If rescheduling, when is the original appointment: 2-15-24

## 2024-02-01 ENCOUNTER — HOSPITAL ENCOUNTER (OUTPATIENT)
Dept: ONCOLOGY | Facility: HOSPITAL | Age: 59
Discharge: HOME | End: 2024-02-01
Payer: COMMERCIAL

## 2024-02-01 VITALS
HEART RATE: 94 BPM | TEMPERATURE: 97.2 F | BODY MASS INDEX: 20.66 KG/M2 | HEIGHT: 65 IN | SYSTOLIC BLOOD PRESSURE: 103 MMHG | WEIGHT: 124 LBS | DIASTOLIC BLOOD PRESSURE: 57 MMHG | RESPIRATION RATE: 16 BRPM

## 2024-02-01 DIAGNOSIS — C50.812 MALIGNANT NEOPLASM OF OVERLAPPING SITES OF LEFT BREAST IN FEMALE, ESTROGEN RECEPTOR NEGATIVE: Primary | ICD-10-CM

## 2024-02-01 DIAGNOSIS — Z17.1 MALIGNANT NEOPLASM OF OVERLAPPING SITES OF LEFT BREAST IN FEMALE, ESTROGEN RECEPTOR NEGATIVE: Primary | ICD-10-CM

## 2024-02-01 LAB
ALBUMIN SERPL-MCNC: 4.1 G/DL (ref 3.5–5.2)
ALBUMIN/GLOB SERPL: 1.8 G/DL
ALP SERPL-CCNC: 68 U/L (ref 39–117)
ALT SERPL W P-5'-P-CCNC: 19 U/L (ref 1–33)
ANION GAP SERPL CALCULATED.3IONS-SCNC: 8 MMOL/L (ref 5–15)
AST SERPL-CCNC: 17 U/L (ref 1–32)
BASOPHILS # BLD AUTO: 0.04 10*3/MM3 (ref 0–0.2)
BASOPHILS NFR BLD AUTO: 0.8 % (ref 0–1.5)
BILIRUB SERPL-MCNC: 0.2 MG/DL (ref 0–1.2)
BUN SERPL-MCNC: 11 MG/DL (ref 6–20)
BUN/CREAT SERPL: 19.6 (ref 7–25)
CALCIUM SPEC-SCNC: 8.9 MG/DL (ref 8.6–10.5)
CHLORIDE SERPL-SCNC: 107 MMOL/L (ref 98–107)
CO2 SERPL-SCNC: 25 MMOL/L (ref 22–29)
CREAT SERPL-MCNC: 0.56 MG/DL (ref 0.57–1)
DEPRECATED RDW RBC AUTO: 69.1 FL (ref 37–54)
EGFRCR SERPLBLD CKD-EPI 2021: 107.3 ML/MIN/1.73
EOSINOPHIL # BLD AUTO: 0.27 10*3/MM3 (ref 0–0.4)
EOSINOPHIL NFR BLD AUTO: 5.6 % (ref 0.3–6.2)
ERYTHROCYTE [DISTWIDTH] IN BLOOD BY AUTOMATED COUNT: 17.1 % (ref 12.3–15.4)
GLOBULIN UR ELPH-MCNC: 2.3 GM/DL
GLUCOSE SERPL-MCNC: 94 MG/DL (ref 65–99)
HCT VFR BLD AUTO: 29 % (ref 34–46.6)
HGB BLD-MCNC: 9.6 G/DL (ref 12–15.9)
IMM GRANULOCYTES # BLD AUTO: 0.02 10*3/MM3 (ref 0–0.05)
IMM GRANULOCYTES NFR BLD AUTO: 0.4 % (ref 0–0.5)
LYMPHOCYTES # BLD AUTO: 0.83 10*3/MM3 (ref 0.7–3.1)
LYMPHOCYTES NFR BLD AUTO: 17.2 % (ref 19.6–45.3)
MCH RBC QN AUTO: 36.5 PG (ref 26.6–33)
MCHC RBC AUTO-ENTMCNC: 33.1 G/DL (ref 31.5–35.7)
MCV RBC AUTO: 110.3 FL (ref 79–97)
MONOCYTES # BLD AUTO: 0.5 10*3/MM3 (ref 0.1–0.9)
MONOCYTES NFR BLD AUTO: 10.4 % (ref 5–12)
NEUTROPHILS NFR BLD AUTO: 3.17 10*3/MM3 (ref 1.7–7)
NEUTROPHILS NFR BLD AUTO: 65.6 % (ref 42.7–76)
PLATELET # BLD AUTO: 202 10*3/MM3 (ref 140–450)
PMV BLD AUTO: 9.1 FL (ref 6–12)
POTASSIUM SERPL-SCNC: 4.3 MMOL/L (ref 3.5–5.2)
PROT SERPL-MCNC: 6.4 G/DL (ref 6–8.5)
RBC # BLD AUTO: 2.63 10*6/MM3 (ref 3.77–5.28)
SODIUM SERPL-SCNC: 140 MMOL/L (ref 136–145)
WBC NRBC COR # BLD AUTO: 4.83 10*3/MM3 (ref 3.4–10.8)

## 2024-02-01 PROCEDURE — 96413 CHEMO IV INFUSION 1 HR: CPT

## 2024-02-01 PROCEDURE — 25010000002 DEXAMETHASONE SODIUM PHOSPHATE 100 MG/10ML SOLUTION: Performed by: NURSE PRACTITIONER

## 2024-02-01 PROCEDURE — 25810000003 SODIUM CHLORIDE 0.9 % SOLUTION: Performed by: NURSE PRACTITIONER

## 2024-02-01 PROCEDURE — 85025 COMPLETE CBC W/AUTO DIFF WBC: CPT | Performed by: NURSE PRACTITIONER

## 2024-02-01 PROCEDURE — 25010000002 HEPARIN LOCK FLUSH PER 10 UNITS: Performed by: INTERNAL MEDICINE

## 2024-02-01 PROCEDURE — 80053 COMPREHEN METABOLIC PANEL: CPT | Performed by: NURSE PRACTITIONER

## 2024-02-01 PROCEDURE — 96375 TX/PRO/DX INJ NEW DRUG ADDON: CPT

## 2024-02-01 PROCEDURE — 25010000002 PACLITAXEL PER 1 MG: Performed by: NURSE PRACTITIONER

## 2024-02-01 PROCEDURE — 25010000002 DIPHENHYDRAMINE PER 50 MG: Performed by: NURSE PRACTITIONER

## 2024-02-01 PROCEDURE — 25810000003 SODIUM CHLORIDE 0.9 % SOLUTION 250 ML FLEX CONT: Performed by: NURSE PRACTITIONER

## 2024-02-01 RX ORDER — HEPARIN SODIUM (PORCINE) LOCK FLUSH IV SOLN 100 UNIT/ML 100 UNIT/ML
500 SOLUTION INTRAVENOUS AS NEEDED
Status: DISCONTINUED | OUTPATIENT
Start: 2024-02-01 | End: 2024-02-02 | Stop reason: HOSPADM

## 2024-02-01 RX ORDER — FAMOTIDINE 10 MG/ML
20 INJECTION, SOLUTION INTRAVENOUS ONCE
Status: COMPLETED | OUTPATIENT
Start: 2024-02-01 | End: 2024-02-01

## 2024-02-01 RX ORDER — SODIUM CHLORIDE 0.9 % (FLUSH) 0.9 %
10 SYRINGE (ML) INJECTION AS NEEDED
OUTPATIENT
Start: 2024-02-01

## 2024-02-01 RX ORDER — SODIUM CHLORIDE 0.9 % (FLUSH) 0.9 %
10 SYRINGE (ML) INJECTION AS NEEDED
Status: DISCONTINUED | OUTPATIENT
Start: 2024-02-01 | End: 2024-02-02 | Stop reason: HOSPADM

## 2024-02-01 RX ORDER — HEPARIN SODIUM (PORCINE) LOCK FLUSH IV SOLN 100 UNIT/ML 100 UNIT/ML
500 SOLUTION INTRAVENOUS AS NEEDED
OUTPATIENT
Start: 2024-02-01

## 2024-02-01 RX ORDER — SODIUM CHLORIDE 9 MG/ML
20 INJECTION, SOLUTION INTRAVENOUS ONCE
Status: COMPLETED | OUTPATIENT
Start: 2024-02-01 | End: 2024-02-01

## 2024-02-01 RX ADMIN — DIPHENHYDRAMINE HYDROCHLORIDE 25 MG: 50 INJECTION INTRAMUSCULAR; INTRAVENOUS at 11:17

## 2024-02-01 RX ADMIN — HEPARIN 500 UNITS: 100 SYRINGE at 13:11

## 2024-02-01 RX ADMIN — DEXAMETHASONE SODIUM PHOSPHATE 12 MG: 10 INJECTION, SOLUTION INTRAMUSCULAR; INTRAVENOUS at 11:17

## 2024-02-01 RX ADMIN — FAMOTIDINE 20 MG: 10 INJECTION, SOLUTION INTRAVENOUS at 11:18

## 2024-02-01 RX ADMIN — SODIUM CHLORIDE 130 MG: 9 INJECTION, SOLUTION INTRAVENOUS at 11:58

## 2024-02-01 RX ADMIN — Medication 10 ML: at 13:10

## 2024-02-01 RX ADMIN — SODIUM CHLORIDE 20 ML/HR: 9 INJECTION, SOLUTION INTRAVENOUS at 11:17

## 2024-02-06 ENCOUNTER — RESEARCH ENCOUNTER (OUTPATIENT)
Dept: OTHER | Facility: OTHER | Age: 59
End: 2024-02-06
Payer: COMMERCIAL

## 2024-02-06 NOTE — RESEARCH
Patient Name:  Swathi Cain  YOB: 1965  Patient Age:  58 y.o.  Patient's Sex:  female    Date of Service:  02/01/2024    Provider:  ADAM Arreola MD                     RESEARCH NOTE                  Protocol --ICE COMPRESS: Randomized Trial of Limb Cryocompression Versus Continuous Compression Versus Low Cyclic Compression for the Prevention of Taxane-Induced Peripheral Neuropathy   NCT #[id number]     Subject ID:  [id number]  ARM 3:  Low Cyclic Compression     Participant here for C5D1 of Taxol.  Planned every week x 12 cycles.     Randomized to low cyclic compression.    Port in place.  All four extremities used for device intervention.  Upon assessment, there were no signs of redness, open wounds or break in skin integrity prior to start of device intervention.  Participant tolerated device intervention without any adjustments. No AEs observed.        RTC in 1 week for #6/12 Taxol infusions and device intervention.       Thank you.  Rosio Moise, RN, BSN, CRC

## 2024-02-08 ENCOUNTER — RESEARCH ENCOUNTER (OUTPATIENT)
Dept: OTHER | Facility: OTHER | Age: 59
End: 2024-02-08
Payer: COMMERCIAL

## 2024-02-08 ENCOUNTER — HOSPITAL ENCOUNTER (OUTPATIENT)
Dept: ONCOLOGY | Facility: HOSPITAL | Age: 59
Discharge: HOME | End: 2024-02-08
Payer: COMMERCIAL

## 2024-02-08 VITALS
HEART RATE: 94 BPM | SYSTOLIC BLOOD PRESSURE: 110 MMHG | HEIGHT: 65 IN | BODY MASS INDEX: 20.49 KG/M2 | WEIGHT: 123 LBS | DIASTOLIC BLOOD PRESSURE: 54 MMHG | RESPIRATION RATE: 16 BRPM | TEMPERATURE: 98.5 F

## 2024-02-08 DIAGNOSIS — C50.812 MALIGNANT NEOPLASM OF OVERLAPPING SITES OF LEFT BREAST IN FEMALE, ESTROGEN RECEPTOR NEGATIVE: Primary | ICD-10-CM

## 2024-02-08 DIAGNOSIS — Z17.1 MALIGNANT NEOPLASM OF OVERLAPPING SITES OF LEFT BREAST IN FEMALE, ESTROGEN RECEPTOR NEGATIVE: Primary | ICD-10-CM

## 2024-02-08 LAB
ALBUMIN SERPL-MCNC: 4.1 G/DL (ref 3.5–5.2)
ALBUMIN/GLOB SERPL: 1.7 G/DL
ALP SERPL-CCNC: 70 U/L (ref 39–117)
ALT SERPL W P-5'-P-CCNC: 19 U/L (ref 1–33)
ANION GAP SERPL CALCULATED.3IONS-SCNC: 7 MMOL/L (ref 5–15)
AST SERPL-CCNC: 19 U/L (ref 1–32)
BASOPHILS # BLD AUTO: 0.04 10*3/MM3 (ref 0–0.2)
BASOPHILS NFR BLD AUTO: 0.9 % (ref 0–1.5)
BILIRUB SERPL-MCNC: 0.2 MG/DL (ref 0–1.2)
BUN SERPL-MCNC: 12 MG/DL (ref 6–20)
BUN/CREAT SERPL: 21.1 (ref 7–25)
CALCIUM SPEC-SCNC: 9.2 MG/DL (ref 8.6–10.5)
CHLORIDE SERPL-SCNC: 106 MMOL/L (ref 98–107)
CO2 SERPL-SCNC: 26 MMOL/L (ref 22–29)
CREAT SERPL-MCNC: 0.57 MG/DL (ref 0.57–1)
DEPRECATED RDW RBC AUTO: 64.8 FL (ref 37–54)
EGFRCR SERPLBLD CKD-EPI 2021: 106.8 ML/MIN/1.73
EOSINOPHIL # BLD AUTO: 0.36 10*3/MM3 (ref 0–0.4)
EOSINOPHIL NFR BLD AUTO: 7.8 % (ref 0.3–6.2)
ERYTHROCYTE [DISTWIDTH] IN BLOOD BY AUTOMATED COUNT: 15.7 % (ref 12.3–15.4)
GLOBULIN UR ELPH-MCNC: 2.4 GM/DL
GLUCOSE SERPL-MCNC: 103 MG/DL (ref 65–99)
HCT VFR BLD AUTO: 29.2 % (ref 34–46.6)
HGB BLD-MCNC: 9.6 G/DL (ref 12–15.9)
IMM GRANULOCYTES # BLD AUTO: 0 10*3/MM3 (ref 0–0.05)
IMM GRANULOCYTES NFR BLD AUTO: 0 % (ref 0–0.5)
LYMPHOCYTES # BLD AUTO: 0.85 10*3/MM3 (ref 0.7–3.1)
LYMPHOCYTES NFR BLD AUTO: 18.4 % (ref 19.6–45.3)
MCH RBC QN AUTO: 36.6 PG (ref 26.6–33)
MCHC RBC AUTO-ENTMCNC: 32.9 G/DL (ref 31.5–35.7)
MCV RBC AUTO: 111.5 FL (ref 79–97)
MONOCYTES # BLD AUTO: 0.5 10*3/MM3 (ref 0.1–0.9)
MONOCYTES NFR BLD AUTO: 10.8 % (ref 5–12)
NEUTROPHILS NFR BLD AUTO: 2.88 10*3/MM3 (ref 1.7–7)
NEUTROPHILS NFR BLD AUTO: 62.1 % (ref 42.7–76)
PLATELET # BLD AUTO: 182 10*3/MM3 (ref 140–450)
PMV BLD AUTO: 9.1 FL (ref 6–12)
POTASSIUM SERPL-SCNC: 4.1 MMOL/L (ref 3.5–5.2)
PROT SERPL-MCNC: 6.5 G/DL (ref 6–8.5)
RBC # BLD AUTO: 2.62 10*6/MM3 (ref 3.77–5.28)
SODIUM SERPL-SCNC: 139 MMOL/L (ref 136–145)
WBC NRBC COR # BLD AUTO: 4.63 10*3/MM3 (ref 3.4–10.8)

## 2024-02-08 PROCEDURE — 96367 TX/PROPH/DG ADDL SEQ IV INF: CPT

## 2024-02-08 PROCEDURE — 96375 TX/PRO/DX INJ NEW DRUG ADDON: CPT

## 2024-02-08 PROCEDURE — 25810000003 SODIUM CHLORIDE 0.9 % SOLUTION 250 ML FLEX CONT: Performed by: NURSE PRACTITIONER

## 2024-02-08 PROCEDURE — 25010000002 DEXAMETHASONE SODIUM PHOSPHATE 100 MG/10ML SOLUTION: Performed by: NURSE PRACTITIONER

## 2024-02-08 PROCEDURE — 25810000003 SODIUM CHLORIDE 0.9 % SOLUTION: Performed by: NURSE PRACTITIONER

## 2024-02-08 PROCEDURE — 25010000002 HEPARIN LOCK FLUSH PER 10 UNITS: Performed by: INTERNAL MEDICINE

## 2024-02-08 PROCEDURE — 25010000002 DIPHENHYDRAMINE PER 50 MG: Performed by: NURSE PRACTITIONER

## 2024-02-08 PROCEDURE — 25010000002 PACLITAXEL PER 1 MG: Performed by: NURSE PRACTITIONER

## 2024-02-08 PROCEDURE — 80053 COMPREHEN METABOLIC PANEL: CPT | Performed by: NURSE PRACTITIONER

## 2024-02-08 PROCEDURE — 85025 COMPLETE CBC W/AUTO DIFF WBC: CPT | Performed by: NURSE PRACTITIONER

## 2024-02-08 PROCEDURE — 96413 CHEMO IV INFUSION 1 HR: CPT

## 2024-02-08 RX ORDER — SODIUM CHLORIDE 0.9 % (FLUSH) 0.9 %
10 SYRINGE (ML) INJECTION AS NEEDED
OUTPATIENT
Start: 2024-02-08

## 2024-02-08 RX ORDER — FAMOTIDINE 10 MG/ML
20 INJECTION, SOLUTION INTRAVENOUS ONCE
Status: COMPLETED | OUTPATIENT
Start: 2024-02-08 | End: 2024-02-08

## 2024-02-08 RX ORDER — SODIUM CHLORIDE 9 MG/ML
20 INJECTION, SOLUTION INTRAVENOUS ONCE
Status: COMPLETED | OUTPATIENT
Start: 2024-02-08 | End: 2024-02-08

## 2024-02-08 RX ORDER — HEPARIN SODIUM (PORCINE) LOCK FLUSH IV SOLN 100 UNIT/ML 100 UNIT/ML
500 SOLUTION INTRAVENOUS AS NEEDED
Status: DISCONTINUED | OUTPATIENT
Start: 2024-02-08 | End: 2024-02-09 | Stop reason: HOSPADM

## 2024-02-08 RX ORDER — HEPARIN SODIUM (PORCINE) LOCK FLUSH IV SOLN 100 UNIT/ML 100 UNIT/ML
500 SOLUTION INTRAVENOUS AS NEEDED
OUTPATIENT
Start: 2024-02-08

## 2024-02-08 RX ADMIN — DIPHENHYDRAMINE HYDROCHLORIDE 25 MG: 50 INJECTION INTRAMUSCULAR; INTRAVENOUS at 11:29

## 2024-02-08 RX ADMIN — HEPARIN 500 UNITS: 100 SYRINGE at 14:16

## 2024-02-08 RX ADMIN — DEXAMETHASONE SODIUM PHOSPHATE 12 MG: 10 INJECTION, SOLUTION INTRAMUSCULAR; INTRAVENOUS at 11:21

## 2024-02-08 RX ADMIN — FAMOTIDINE 20 MG: 10 INJECTION, SOLUTION INTRAVENOUS at 11:19

## 2024-02-08 RX ADMIN — SODIUM CHLORIDE 130 MG: 9 INJECTION, SOLUTION INTRAVENOUS at 12:26

## 2024-02-08 RX ADMIN — SODIUM CHLORIDE 20 ML/HR: 9 INJECTION, SOLUTION INTRAVENOUS at 11:19

## 2024-02-08 NOTE — RESEARCH
Patient Name:  Swathi Cain  YOB: 1965  Patient Age:  58 y.o.  Patient's Sex:  female    Date of Service:  02/08/2024    Provider:  Dr. Arreola                     RESEARCH NOTE                  Protocol --ICE COMPRESS: Randomized Trial of Limb Cryocompression Versus Continuous Compression Versus Low Cyclic Compression for the Prevention of Taxane-Induced Peripheral Neuropathy   NCT #[id number]     Subject ID:  [id number]  ARM 3:  Low Cyclic Compression     Participant here for C6D1 of Taxol.  Planned every week x 12 cycles.     Randomized to low cyclic compression.    Port in place.  All four extremities used for device intervention.  Upon assessment, there were no signs of redness, open wounds or break in skin integrity prior to start of device intervention.  Participant tolerated device intervention without any adjustments. No AEs observed.        RTC in 1 week for #7/12 Taxol infusions and device intervention.       Kavita Pierre RN, BSN, CRC

## 2024-02-16 ENCOUNTER — APPOINTMENT (OUTPATIENT)
Dept: ONCOLOGY | Facility: HOSPITAL | Age: 59
End: 2024-02-16
Payer: COMMERCIAL

## 2024-02-16 ENCOUNTER — HOSPITAL ENCOUNTER (OUTPATIENT)
Dept: ONCOLOGY | Facility: HOSPITAL | Age: 59
Discharge: HOME | End: 2024-02-16
Payer: COMMERCIAL

## 2024-02-16 ENCOUNTER — OFFICE VISIT (OUTPATIENT)
Dept: ONCOLOGY | Facility: CLINIC | Age: 59
End: 2024-02-16
Payer: COMMERCIAL

## 2024-02-16 ENCOUNTER — RESEARCH ENCOUNTER (OUTPATIENT)
Dept: OTHER | Facility: OTHER | Age: 59
End: 2024-02-16
Payer: COMMERCIAL

## 2024-02-16 VITALS
TEMPERATURE: 96.4 F | SYSTOLIC BLOOD PRESSURE: 130 MMHG | OXYGEN SATURATION: 100 % | WEIGHT: 125 LBS | BODY MASS INDEX: 20.83 KG/M2 | HEART RATE: 110 BPM | DIASTOLIC BLOOD PRESSURE: 79 MMHG | HEIGHT: 65 IN | RESPIRATION RATE: 16 BRPM

## 2024-02-16 VITALS — SYSTOLIC BLOOD PRESSURE: 106 MMHG | DIASTOLIC BLOOD PRESSURE: 66 MMHG | HEART RATE: 91 BPM

## 2024-02-16 DIAGNOSIS — Z17.1 MALIGNANT NEOPLASM OF OVERLAPPING SITES OF LEFT BREAST IN FEMALE, ESTROGEN RECEPTOR NEGATIVE: Primary | ICD-10-CM

## 2024-02-16 DIAGNOSIS — C50.812 MALIGNANT NEOPLASM OF OVERLAPPING SITES OF LEFT BREAST IN FEMALE, ESTROGEN RECEPTOR NEGATIVE: Primary | ICD-10-CM

## 2024-02-16 LAB
ALBUMIN SERPL-MCNC: 4.1 G/DL (ref 3.5–5.2)
ALBUMIN/GLOB SERPL: 2 G/DL
ALP SERPL-CCNC: 72 U/L (ref 39–117)
ALT SERPL W P-5'-P-CCNC: 25 U/L (ref 1–33)
ANION GAP SERPL CALCULATED.3IONS-SCNC: 7 MMOL/L (ref 5–15)
AST SERPL-CCNC: 25 U/L (ref 1–32)
BASOPHILS # BLD AUTO: 0.03 10*3/MM3 (ref 0–0.2)
BASOPHILS NFR BLD AUTO: 0.7 % (ref 0–1.5)
BILIRUB SERPL-MCNC: 0.2 MG/DL (ref 0–1.2)
BUN SERPL-MCNC: 10 MG/DL (ref 6–20)
BUN/CREAT SERPL: 15.6 (ref 7–25)
CALCIUM SPEC-SCNC: 9.1 MG/DL (ref 8.6–10.5)
CHLORIDE SERPL-SCNC: 106 MMOL/L (ref 98–107)
CO2 SERPL-SCNC: 26 MMOL/L (ref 22–29)
CREAT SERPL-MCNC: 0.64 MG/DL (ref 0.57–1)
DEPRECATED RDW RBC AUTO: 62.1 FL (ref 37–54)
EGFRCR SERPLBLD CKD-EPI 2021: 103.9 ML/MIN/1.73
EOSINOPHIL # BLD AUTO: 0.23 10*3/MM3 (ref 0–0.4)
EOSINOPHIL NFR BLD AUTO: 5.4 % (ref 0.3–6.2)
ERYTHROCYTE [DISTWIDTH] IN BLOOD BY AUTOMATED COUNT: 14.9 % (ref 12.3–15.4)
GLOBULIN UR ELPH-MCNC: 2.1 GM/DL
GLUCOSE SERPL-MCNC: 103 MG/DL (ref 65–99)
HCT VFR BLD AUTO: 30.2 % (ref 34–46.6)
HGB BLD-MCNC: 10 G/DL (ref 12–15.9)
IMM GRANULOCYTES # BLD AUTO: 0.01 10*3/MM3 (ref 0–0.05)
IMM GRANULOCYTES NFR BLD AUTO: 0.2 % (ref 0–0.5)
LYMPHOCYTES # BLD AUTO: 0.94 10*3/MM3 (ref 0.7–3.1)
LYMPHOCYTES NFR BLD AUTO: 22 % (ref 19.6–45.3)
MCH RBC QN AUTO: 37.2 PG (ref 26.6–33)
MCHC RBC AUTO-ENTMCNC: 33.1 G/DL (ref 31.5–35.7)
MCV RBC AUTO: 112.3 FL (ref 79–97)
MONOCYTES # BLD AUTO: 0.49 10*3/MM3 (ref 0.1–0.9)
MONOCYTES NFR BLD AUTO: 11.4 % (ref 5–12)
NEUTROPHILS NFR BLD AUTO: 2.58 10*3/MM3 (ref 1.7–7)
NEUTROPHILS NFR BLD AUTO: 60.3 % (ref 42.7–76)
PLATELET # BLD AUTO: 188 10*3/MM3 (ref 140–450)
PMV BLD AUTO: 9.5 FL (ref 6–12)
POTASSIUM SERPL-SCNC: 4.4 MMOL/L (ref 3.5–5.2)
PROT SERPL-MCNC: 6.2 G/DL (ref 6–8.5)
RBC # BLD AUTO: 2.69 10*6/MM3 (ref 3.77–5.28)
SODIUM SERPL-SCNC: 139 MMOL/L (ref 136–145)
WBC NRBC COR # BLD AUTO: 4.28 10*3/MM3 (ref 3.4–10.8)

## 2024-02-16 PROCEDURE — 25010000002 DEXAMETHASONE SODIUM PHOSPHATE 100 MG/10ML SOLUTION: Performed by: NURSE PRACTITIONER

## 2024-02-16 PROCEDURE — 25010000002 DIPHENHYDRAMINE PER 50 MG: Performed by: NURSE PRACTITIONER

## 2024-02-16 PROCEDURE — 25810000003 SODIUM CHLORIDE 0.9 % SOLUTION: Performed by: NURSE PRACTITIONER

## 2024-02-16 PROCEDURE — 96374 THER/PROPH/DIAG INJ IV PUSH: CPT

## 2024-02-16 PROCEDURE — 25010000002 HEPARIN LOCK FLUSH PER 10 UNITS: Performed by: INTERNAL MEDICINE

## 2024-02-16 PROCEDURE — 25010000002 PACLITAXEL PER 1 MG: Performed by: NURSE PRACTITIONER

## 2024-02-16 PROCEDURE — 99213 OFFICE O/P EST LOW 20 MIN: CPT | Performed by: NURSE PRACTITIONER

## 2024-02-16 PROCEDURE — 85025 COMPLETE CBC W/AUTO DIFF WBC: CPT | Performed by: NURSE PRACTITIONER

## 2024-02-16 PROCEDURE — 25810000003 SODIUM CHLORIDE 0.9 % SOLUTION 250 ML FLEX CONT: Performed by: NURSE PRACTITIONER

## 2024-02-16 PROCEDURE — 80053 COMPREHEN METABOLIC PANEL: CPT | Performed by: NURSE PRACTITIONER

## 2024-02-16 PROCEDURE — 96375 TX/PRO/DX INJ NEW DRUG ADDON: CPT

## 2024-02-16 PROCEDURE — 96413 CHEMO IV INFUSION 1 HR: CPT

## 2024-02-16 RX ORDER — SODIUM CHLORIDE 9 MG/ML
20 INJECTION, SOLUTION INTRAVENOUS ONCE
OUTPATIENT
Start: 2024-02-22

## 2024-02-16 RX ORDER — DIPHENHYDRAMINE HYDROCHLORIDE 50 MG/ML
50 INJECTION INTRAMUSCULAR; INTRAVENOUS AS NEEDED
OUTPATIENT
Start: 2024-02-29

## 2024-02-16 RX ORDER — FAMOTIDINE 10 MG/ML
20 INJECTION, SOLUTION INTRAVENOUS AS NEEDED
OUTPATIENT
Start: 2024-02-29

## 2024-02-16 RX ORDER — SODIUM CHLORIDE 9 MG/ML
20 INJECTION, SOLUTION INTRAVENOUS ONCE
OUTPATIENT
Start: 2024-02-29

## 2024-02-16 RX ORDER — DIPHENHYDRAMINE HYDROCHLORIDE 50 MG/ML
50 INJECTION INTRAMUSCULAR; INTRAVENOUS AS NEEDED
OUTPATIENT
Start: 2024-02-22

## 2024-02-16 RX ORDER — DIPHENHYDRAMINE HYDROCHLORIDE 50 MG/ML
50 INJECTION INTRAMUSCULAR; INTRAVENOUS AS NEEDED
OUTPATIENT
Start: 2024-03-08

## 2024-02-16 RX ORDER — SODIUM CHLORIDE 9 MG/ML
20 INJECTION, SOLUTION INTRAVENOUS ONCE
OUTPATIENT
Start: 2024-03-08

## 2024-02-16 RX ORDER — FAMOTIDINE 10 MG/ML
20 INJECTION, SOLUTION INTRAVENOUS ONCE
OUTPATIENT
Start: 2024-03-08

## 2024-02-16 RX ORDER — FAMOTIDINE 10 MG/ML
20 INJECTION, SOLUTION INTRAVENOUS AS NEEDED
OUTPATIENT
Start: 2024-02-22

## 2024-02-16 RX ORDER — SODIUM CHLORIDE 9 MG/ML
20 INJECTION, SOLUTION INTRAVENOUS ONCE
Status: COMPLETED | OUTPATIENT
Start: 2024-02-16 | End: 2024-02-16

## 2024-02-16 RX ORDER — HEPARIN SODIUM (PORCINE) LOCK FLUSH IV SOLN 100 UNIT/ML 100 UNIT/ML
500 SOLUTION INTRAVENOUS AS NEEDED
OUTPATIENT
Start: 2024-02-16

## 2024-02-16 RX ORDER — FAMOTIDINE 10 MG/ML
20 INJECTION, SOLUTION INTRAVENOUS AS NEEDED
OUTPATIENT
Start: 2024-03-08

## 2024-02-16 RX ORDER — HEPARIN SODIUM (PORCINE) LOCK FLUSH IV SOLN 100 UNIT/ML 100 UNIT/ML
500 SOLUTION INTRAVENOUS AS NEEDED
Status: DISCONTINUED | OUTPATIENT
Start: 2024-02-16 | End: 2024-02-17 | Stop reason: HOSPADM

## 2024-02-16 RX ORDER — FAMOTIDINE 10 MG/ML
20 INJECTION, SOLUTION INTRAVENOUS ONCE
OUTPATIENT
Start: 2024-02-29

## 2024-02-16 RX ORDER — FAMOTIDINE 10 MG/ML
20 INJECTION, SOLUTION INTRAVENOUS ONCE
Status: COMPLETED | OUTPATIENT
Start: 2024-02-16 | End: 2024-02-16

## 2024-02-16 RX ORDER — SODIUM CHLORIDE 0.9 % (FLUSH) 0.9 %
10 SYRINGE (ML) INJECTION AS NEEDED
OUTPATIENT
Start: 2024-02-16

## 2024-02-16 RX ORDER — FAMOTIDINE 10 MG/ML
20 INJECTION, SOLUTION INTRAVENOUS ONCE
OUTPATIENT
Start: 2024-02-22

## 2024-02-16 RX ADMIN — DIPHENHYDRAMINE HYDROCHLORIDE 25 MG: 50 INJECTION INTRAMUSCULAR; INTRAVENOUS at 10:24

## 2024-02-16 RX ADMIN — PACLITAXEL 130 MG: 6 INJECTION, SOLUTION, CONCENTRATE INTRAVENOUS at 11:40

## 2024-02-16 RX ADMIN — HEPARIN 500 UNITS: 100 SYRINGE at 13:31

## 2024-02-16 RX ADMIN — SODIUM CHLORIDE 20 ML/HR: 9 INJECTION, SOLUTION INTRAVENOUS at 10:23

## 2024-02-16 RX ADMIN — DEXAMETHASONE SODIUM PHOSPHATE 12 MG: 10 INJECTION, SOLUTION INTRAMUSCULAR; INTRAVENOUS at 10:24

## 2024-02-16 RX ADMIN — FAMOTIDINE 20 MG: 10 INJECTION, SOLUTION INTRAVENOUS at 10:24

## 2024-02-22 ENCOUNTER — HOSPITAL ENCOUNTER (OUTPATIENT)
Dept: ONCOLOGY | Facility: HOSPITAL | Age: 59
Discharge: HOME | End: 2024-02-22
Admitting: NURSE PRACTITIONER
Payer: COMMERCIAL

## 2024-02-22 ENCOUNTER — RESEARCH ENCOUNTER (OUTPATIENT)
Dept: OTHER | Facility: OTHER | Age: 59
End: 2024-02-22
Payer: COMMERCIAL

## 2024-02-22 VITALS
HEIGHT: 65 IN | BODY MASS INDEX: 20.83 KG/M2 | HEART RATE: 95 BPM | TEMPERATURE: 97.8 F | SYSTOLIC BLOOD PRESSURE: 123 MMHG | DIASTOLIC BLOOD PRESSURE: 71 MMHG | WEIGHT: 125 LBS | RESPIRATION RATE: 16 BRPM

## 2024-02-22 DIAGNOSIS — Z17.1 MALIGNANT NEOPLASM OF OVERLAPPING SITES OF LEFT BREAST IN FEMALE, ESTROGEN RECEPTOR NEGATIVE: Primary | ICD-10-CM

## 2024-02-22 DIAGNOSIS — C50.812 MALIGNANT NEOPLASM OF OVERLAPPING SITES OF LEFT BREAST IN FEMALE, ESTROGEN RECEPTOR NEGATIVE: Primary | ICD-10-CM

## 2024-02-22 LAB
ALBUMIN SERPL-MCNC: 4.3 G/DL (ref 3.5–5.2)
ALBUMIN/GLOB SERPL: 2 G/DL
ALP SERPL-CCNC: 74 U/L (ref 39–117)
ALT SERPL W P-5'-P-CCNC: 38 U/L (ref 1–33)
ANION GAP SERPL CALCULATED.3IONS-SCNC: 7 MMOL/L (ref 5–15)
AST SERPL-CCNC: 32 U/L (ref 1–32)
BASOPHILS # BLD AUTO: 0.02 10*3/MM3 (ref 0–0.2)
BASOPHILS NFR BLD AUTO: 0.6 % (ref 0–1.5)
BILIRUB SERPL-MCNC: 0.2 MG/DL (ref 0–1.2)
BUN SERPL-MCNC: 12 MG/DL (ref 6–20)
BUN/CREAT SERPL: 20 (ref 7–25)
CALCIUM SPEC-SCNC: 9 MG/DL (ref 8.6–10.5)
CHLORIDE SERPL-SCNC: 105 MMOL/L (ref 98–107)
CO2 SERPL-SCNC: 26 MMOL/L (ref 22–29)
CREAT SERPL-MCNC: 0.6 MG/DL (ref 0.57–1)
DEPRECATED RDW RBC AUTO: 57.3 FL (ref 37–54)
EGFRCR SERPLBLD CKD-EPI 2021: 105.5 ML/MIN/1.73
EOSINOPHIL # BLD AUTO: 0.13 10*3/MM3 (ref 0–0.4)
EOSINOPHIL NFR BLD AUTO: 4.1 % (ref 0.3–6.2)
ERYTHROCYTE [DISTWIDTH] IN BLOOD BY AUTOMATED COUNT: 13.9 % (ref 12.3–15.4)
GLOBULIN UR ELPH-MCNC: 2.2 GM/DL
GLUCOSE SERPL-MCNC: 132 MG/DL (ref 65–99)
HCT VFR BLD AUTO: 30.9 % (ref 34–46.6)
HGB BLD-MCNC: 10.4 G/DL (ref 12–15.9)
IMM GRANULOCYTES # BLD AUTO: 0.02 10*3/MM3 (ref 0–0.05)
IMM GRANULOCYTES NFR BLD AUTO: 0.6 % (ref 0–0.5)
LYMPHOCYTES # BLD AUTO: 0.8 10*3/MM3 (ref 0.7–3.1)
LYMPHOCYTES NFR BLD AUTO: 25.1 % (ref 19.6–45.3)
MCH RBC QN AUTO: 37.3 PG (ref 26.6–33)
MCHC RBC AUTO-ENTMCNC: 33.7 G/DL (ref 31.5–35.7)
MCV RBC AUTO: 110.8 FL (ref 79–97)
MONOCYTES # BLD AUTO: 0.33 10*3/MM3 (ref 0.1–0.9)
MONOCYTES NFR BLD AUTO: 10.3 % (ref 5–12)
NEUTROPHILS NFR BLD AUTO: 1.89 10*3/MM3 (ref 1.7–7)
NEUTROPHILS NFR BLD AUTO: 59.3 % (ref 42.7–76)
PLATELET # BLD AUTO: 164 10*3/MM3 (ref 140–450)
PMV BLD AUTO: 9 FL (ref 6–12)
POTASSIUM SERPL-SCNC: 3.9 MMOL/L (ref 3.5–5.2)
PROT SERPL-MCNC: 6.5 G/DL (ref 6–8.5)
RBC # BLD AUTO: 2.79 10*6/MM3 (ref 3.77–5.28)
SODIUM SERPL-SCNC: 138 MMOL/L (ref 136–145)
WBC NRBC COR # BLD AUTO: 3.19 10*3/MM3 (ref 3.4–10.8)

## 2024-02-22 PROCEDURE — 80053 COMPREHEN METABOLIC PANEL: CPT | Performed by: NURSE PRACTITIONER

## 2024-02-22 PROCEDURE — 96367 TX/PROPH/DG ADDL SEQ IV INF: CPT

## 2024-02-22 PROCEDURE — 25810000003 SODIUM CHLORIDE 0.9 % SOLUTION: Performed by: NURSE PRACTITIONER

## 2024-02-22 PROCEDURE — 25010000002 PACLITAXEL PER 1 MG: Performed by: NURSE PRACTITIONER

## 2024-02-22 PROCEDURE — 96413 CHEMO IV INFUSION 1 HR: CPT

## 2024-02-22 PROCEDURE — 85025 COMPLETE CBC W/AUTO DIFF WBC: CPT | Performed by: NURSE PRACTITIONER

## 2024-02-22 PROCEDURE — 25010000002 HEPARIN LOCK FLUSH PER 10 UNITS: Performed by: INTERNAL MEDICINE

## 2024-02-22 PROCEDURE — 25010000002 DIPHENHYDRAMINE PER 50 MG: Performed by: NURSE PRACTITIONER

## 2024-02-22 PROCEDURE — 25010000002 DEXAMETHASONE SODIUM PHOSPHATE 100 MG/10ML SOLUTION: Performed by: NURSE PRACTITIONER

## 2024-02-22 PROCEDURE — 25810000003 SODIUM CHLORIDE 0.9 % SOLUTION 250 ML FLEX CONT: Performed by: NURSE PRACTITIONER

## 2024-02-22 PROCEDURE — 96375 TX/PRO/DX INJ NEW DRUG ADDON: CPT

## 2024-02-22 RX ORDER — SODIUM CHLORIDE 9 MG/ML
20 INJECTION, SOLUTION INTRAVENOUS ONCE
Status: COMPLETED | OUTPATIENT
Start: 2024-02-22 | End: 2024-02-22

## 2024-02-22 RX ORDER — HEPARIN SODIUM (PORCINE) LOCK FLUSH IV SOLN 100 UNIT/ML 100 UNIT/ML
500 SOLUTION INTRAVENOUS AS NEEDED
OUTPATIENT
Start: 2024-02-22

## 2024-02-22 RX ORDER — HEPARIN SODIUM (PORCINE) LOCK FLUSH IV SOLN 100 UNIT/ML 100 UNIT/ML
500 SOLUTION INTRAVENOUS AS NEEDED
Status: DISCONTINUED | OUTPATIENT
Start: 2024-02-22 | End: 2024-02-23 | Stop reason: HOSPADM

## 2024-02-22 RX ORDER — FAMOTIDINE 10 MG/ML
20 INJECTION, SOLUTION INTRAVENOUS ONCE
Status: COMPLETED | OUTPATIENT
Start: 2024-02-22 | End: 2024-02-22

## 2024-02-22 RX ORDER — SODIUM CHLORIDE 0.9 % (FLUSH) 0.9 %
10 SYRINGE (ML) INJECTION AS NEEDED
OUTPATIENT
Start: 2024-02-22

## 2024-02-22 RX ADMIN — DEXAMETHASONE SODIUM PHOSPHATE 12 MG: 10 INJECTION, SOLUTION INTRAMUSCULAR; INTRAVENOUS at 11:45

## 2024-02-22 RX ADMIN — DIPHENHYDRAMINE HYDROCHLORIDE 25 MG: 50 INJECTION, SOLUTION INTRAMUSCULAR; INTRAVENOUS at 11:45

## 2024-02-22 RX ADMIN — HEPARIN 500 UNITS: 100 SYRINGE at 13:55

## 2024-02-22 RX ADMIN — PACLITAXEL 130 MG: 6 INJECTION, SOLUTION INTRAVENOUS at 12:37

## 2024-02-22 RX ADMIN — FAMOTIDINE 20 MG: 10 INJECTION, SOLUTION INTRAVENOUS at 11:42

## 2024-02-22 RX ADMIN — SODIUM CHLORIDE 20 ML/HR: 9 INJECTION, SOLUTION INTRAVENOUS at 11:41

## 2024-02-22 NOTE — RESEARCH
Patient Name:  Swathi Cain  YOB: 1965  Patient Age:  58 y.o.  Patient's Sex:  female    Date of Service:  02/22/2024    Provider:  ADAM Arreola MD                     RESEARCH NOTE                  Protocol --ICE COMPRESS: Randomized Trial of Limb Cryocompression Versus Continuous Compression Versus Low Cyclic Compression for the Prevention of Taxane-Induced Peripheral Neuropathy   NCT #[id number]     Subject ID:  [id number]  ARM 3:  Low Cyclic Compression     Participant here for C8D1 of Taxol.  Planned every week x 12 cycles.     Randomized to low cyclic compression.    Port in place.  All four extremities used for device intervention.  Upon assessment, there were no signs of redness, open wounds or break in skin integrity prior to start of device intervention.  Participant tolerated device intervention without any adjustments. No AEs observed.        RTC in 1 week for #9/12 Taxol infusions and device intervention.       Thank you.  Rosio Moise, RN, BSN, CRC

## 2024-02-29 ENCOUNTER — APPOINTMENT (OUTPATIENT)
Dept: ONCOLOGY | Facility: HOSPITAL | Age: 59
End: 2024-02-29
Payer: COMMERCIAL

## 2024-02-29 ENCOUNTER — HOSPITAL ENCOUNTER (OUTPATIENT)
Dept: ONCOLOGY | Facility: HOSPITAL | Age: 59
Discharge: HOME | End: 2024-02-29
Payer: COMMERCIAL

## 2024-02-29 ENCOUNTER — RESEARCH ENCOUNTER (OUTPATIENT)
Dept: OTHER | Facility: OTHER | Age: 59
End: 2024-02-29
Payer: COMMERCIAL

## 2024-02-29 VITALS
SYSTOLIC BLOOD PRESSURE: 110 MMHG | TEMPERATURE: 97.8 F | DIASTOLIC BLOOD PRESSURE: 68 MMHG | HEART RATE: 91 BPM | WEIGHT: 127 LBS | HEIGHT: 65 IN | RESPIRATION RATE: 18 BRPM | BODY MASS INDEX: 21.16 KG/M2

## 2024-02-29 DIAGNOSIS — C50.812 MALIGNANT NEOPLASM OF OVERLAPPING SITES OF LEFT BREAST IN FEMALE, ESTROGEN RECEPTOR NEGATIVE: Primary | ICD-10-CM

## 2024-02-29 DIAGNOSIS — Z17.1 MALIGNANT NEOPLASM OF OVERLAPPING SITES OF LEFT BREAST IN FEMALE, ESTROGEN RECEPTOR NEGATIVE: Primary | ICD-10-CM

## 2024-02-29 LAB
ALBUMIN SERPL-MCNC: 4.2 G/DL (ref 3.5–5.2)
ALBUMIN/GLOB SERPL: 1.9 G/DL
ALP SERPL-CCNC: 71 U/L (ref 39–117)
ALT SERPL W P-5'-P-CCNC: 29 U/L (ref 1–33)
ANION GAP SERPL CALCULATED.3IONS-SCNC: 9 MMOL/L (ref 5–15)
AST SERPL-CCNC: 24 U/L (ref 1–32)
BASOPHILS # BLD AUTO: 0.03 10*3/MM3 (ref 0–0.2)
BASOPHILS NFR BLD AUTO: 0.7 % (ref 0–1.5)
BILIRUB SERPL-MCNC: 0.2 MG/DL (ref 0–1.2)
BUN SERPL-MCNC: 16 MG/DL (ref 6–20)
BUN/CREAT SERPL: 27.6 (ref 7–25)
CALCIUM SPEC-SCNC: 8.9 MG/DL (ref 8.6–10.5)
CHLORIDE SERPL-SCNC: 103 MMOL/L (ref 98–107)
CO2 SERPL-SCNC: 24 MMOL/L (ref 22–29)
CREAT SERPL-MCNC: 0.58 MG/DL (ref 0.57–1)
DEPRECATED RDW RBC AUTO: 56 FL (ref 37–54)
EGFRCR SERPLBLD CKD-EPI 2021: 106.4 ML/MIN/1.73
EOSINOPHIL # BLD AUTO: 0.16 10*3/MM3 (ref 0–0.4)
EOSINOPHIL NFR BLD AUTO: 3.8 % (ref 0.3–6.2)
ERYTHROCYTE [DISTWIDTH] IN BLOOD BY AUTOMATED COUNT: 13.6 % (ref 12.3–15.4)
GLOBULIN UR ELPH-MCNC: 2.2 GM/DL
GLUCOSE SERPL-MCNC: 133 MG/DL (ref 65–99)
HCT VFR BLD AUTO: 31.6 % (ref 34–46.6)
HGB BLD-MCNC: 10.5 G/DL (ref 12–15.9)
IMM GRANULOCYTES # BLD AUTO: 0.02 10*3/MM3 (ref 0–0.05)
IMM GRANULOCYTES NFR BLD AUTO: 0.5 % (ref 0–0.5)
LYMPHOCYTES # BLD AUTO: 1.02 10*3/MM3 (ref 0.7–3.1)
LYMPHOCYTES NFR BLD AUTO: 24.3 % (ref 19.6–45.3)
MCH RBC QN AUTO: 37.5 PG (ref 26.6–33)
MCHC RBC AUTO-ENTMCNC: 33.2 G/DL (ref 31.5–35.7)
MCV RBC AUTO: 112.9 FL (ref 79–97)
MONOCYTES # BLD AUTO: 0.43 10*3/MM3 (ref 0.1–0.9)
MONOCYTES NFR BLD AUTO: 10.3 % (ref 5–12)
NEUTROPHILS NFR BLD AUTO: 2.53 10*3/MM3 (ref 1.7–7)
NEUTROPHILS NFR BLD AUTO: 60.4 % (ref 42.7–76)
PLATELET # BLD AUTO: 168 10*3/MM3 (ref 140–450)
PMV BLD AUTO: 9.2 FL (ref 6–12)
POTASSIUM SERPL-SCNC: 4.3 MMOL/L (ref 3.5–5.2)
PROT SERPL-MCNC: 6.4 G/DL (ref 6–8.5)
RBC # BLD AUTO: 2.8 10*6/MM3 (ref 3.77–5.28)
SODIUM SERPL-SCNC: 136 MMOL/L (ref 136–145)
WBC NRBC COR # BLD AUTO: 4.19 10*3/MM3 (ref 3.4–10.8)

## 2024-02-29 PROCEDURE — 25010000002 PACLITAXEL PER 1 MG: Performed by: NURSE PRACTITIONER

## 2024-02-29 PROCEDURE — 25810000003 SODIUM CHLORIDE 0.9 % SOLUTION: Performed by: NURSE PRACTITIONER

## 2024-02-29 PROCEDURE — 25810000003 SODIUM CHLORIDE 0.9 % SOLUTION 250 ML FLEX CONT: Performed by: NURSE PRACTITIONER

## 2024-02-29 PROCEDURE — 96413 CHEMO IV INFUSION 1 HR: CPT

## 2024-02-29 PROCEDURE — 85025 COMPLETE CBC W/AUTO DIFF WBC: CPT | Performed by: NURSE PRACTITIONER

## 2024-02-29 PROCEDURE — 25010000002 HEPARIN LOCK FLUSH PER 10 UNITS: Performed by: INTERNAL MEDICINE

## 2024-02-29 PROCEDURE — 80053 COMPREHEN METABOLIC PANEL: CPT | Performed by: NURSE PRACTITIONER

## 2024-02-29 PROCEDURE — 96367 TX/PROPH/DG ADDL SEQ IV INF: CPT

## 2024-02-29 PROCEDURE — 25010000002 DEXAMETHASONE SODIUM PHOSPHATE 100 MG/10ML SOLUTION: Performed by: NURSE PRACTITIONER

## 2024-02-29 PROCEDURE — 96375 TX/PRO/DX INJ NEW DRUG ADDON: CPT

## 2024-02-29 PROCEDURE — 25010000002 DIPHENHYDRAMINE PER 50 MG: Performed by: NURSE PRACTITIONER

## 2024-02-29 RX ORDER — HEPARIN SODIUM (PORCINE) LOCK FLUSH IV SOLN 100 UNIT/ML 100 UNIT/ML
500 SOLUTION INTRAVENOUS AS NEEDED
Status: DISCONTINUED | OUTPATIENT
Start: 2024-02-29 | End: 2024-03-01 | Stop reason: HOSPADM

## 2024-02-29 RX ORDER — SODIUM CHLORIDE 0.9 % (FLUSH) 0.9 %
10 SYRINGE (ML) INJECTION AS NEEDED
OUTPATIENT
Start: 2024-02-29

## 2024-02-29 RX ORDER — FAMOTIDINE 10 MG/ML
20 INJECTION, SOLUTION INTRAVENOUS ONCE
Status: COMPLETED | OUTPATIENT
Start: 2024-02-29 | End: 2024-02-29

## 2024-02-29 RX ORDER — HEPARIN SODIUM (PORCINE) LOCK FLUSH IV SOLN 100 UNIT/ML 100 UNIT/ML
500 SOLUTION INTRAVENOUS AS NEEDED
OUTPATIENT
Start: 2024-02-29

## 2024-02-29 RX ORDER — SODIUM CHLORIDE 9 MG/ML
20 INJECTION, SOLUTION INTRAVENOUS ONCE
Status: COMPLETED | OUTPATIENT
Start: 2024-02-29 | End: 2024-02-29

## 2024-02-29 RX ADMIN — DIPHENHYDRAMINE HYDROCHLORIDE 25 MG: 50 INJECTION, SOLUTION INTRAMUSCULAR; INTRAVENOUS at 11:38

## 2024-02-29 RX ADMIN — HEPARIN 500 UNITS: 100 SYRINGE at 14:10

## 2024-02-29 RX ADMIN — DEXAMETHASONE SODIUM PHOSPHATE 12 MG: 10 INJECTION, SOLUTION INTRAMUSCULAR; INTRAVENOUS at 11:30

## 2024-02-29 RX ADMIN — SODIUM CHLORIDE 20 ML/HR: 9 INJECTION, SOLUTION INTRAVENOUS at 11:30

## 2024-02-29 RX ADMIN — SODIUM CHLORIDE 130 MG: 9 INJECTION, SOLUTION INTRAVENOUS at 12:25

## 2024-02-29 RX ADMIN — FAMOTIDINE 20 MG: 10 INJECTION, SOLUTION INTRAVENOUS at 11:38

## 2024-02-29 NOTE — RESEARCH
Patient Name:  Swathi Cain  YOB: 1965  Patient Age:  58 y.o.  Patient's Sex:  female    Date of Service:  02/29/2024    Provider:  ADAM Arreola MD                     RESEARCH NOTE                  Protocol --ICE COMPRESS: Randomized Trial of Limb Cryocompression Versus Continuous Compression Versus Low Cyclic Compression for the Prevention of Taxane-Induced Peripheral Neuropathy   NCT #[id number]     Subject ID:  [id number]  ARM 3:  Low Cyclic Compression     Participant here for C9D1 of Taxol.  Planned every week x 12 cycles.     Randomized to low cyclic compression.    Port in place.  All four extremities used for device intervention.  Upon assessment, there were no signs of redness, open wounds or break in skin integrity prior to start of device intervention.  Participant tolerated device intervention without any adjustments. No AEs observed.  Pt does not have any complaints of peripheral neuropathy.     RTC in 1 week for #10/12 Taxol infusions and device intervention.       Thank you.  Rosio Moise, RN, BSN, CRC

## 2024-03-07 ENCOUNTER — OFFICE VISIT (OUTPATIENT)
Dept: ONCOLOGY | Facility: CLINIC | Age: 59
End: 2024-03-07
Payer: COMMERCIAL

## 2024-03-07 ENCOUNTER — HOSPITAL ENCOUNTER (OUTPATIENT)
Dept: ONCOLOGY | Facility: HOSPITAL | Age: 59
Discharge: HOME | End: 2024-03-07
Admitting: NURSE PRACTITIONER
Payer: COMMERCIAL

## 2024-03-07 VITALS
RESPIRATION RATE: 16 BRPM | HEART RATE: 106 BPM | HEIGHT: 65 IN | TEMPERATURE: 97.6 F | OXYGEN SATURATION: 99 % | DIASTOLIC BLOOD PRESSURE: 74 MMHG | WEIGHT: 125 LBS | BODY MASS INDEX: 20.83 KG/M2 | SYSTOLIC BLOOD PRESSURE: 133 MMHG

## 2024-03-07 VITALS
RESPIRATION RATE: 18 BRPM | HEIGHT: 65 IN | HEART RATE: 104 BPM | WEIGHT: 126.4 LBS | SYSTOLIC BLOOD PRESSURE: 133 MMHG | DIASTOLIC BLOOD PRESSURE: 74 MMHG | BODY MASS INDEX: 21.06 KG/M2 | TEMPERATURE: 97.9 F

## 2024-03-07 DIAGNOSIS — C50.812 MALIGNANT NEOPLASM OF OVERLAPPING SITES OF LEFT BREAST IN FEMALE, ESTROGEN RECEPTOR NEGATIVE: Primary | ICD-10-CM

## 2024-03-07 DIAGNOSIS — Z17.1 MALIGNANT NEOPLASM OF OVERLAPPING SITES OF LEFT BREAST IN FEMALE, ESTROGEN RECEPTOR NEGATIVE: Primary | ICD-10-CM

## 2024-03-07 LAB
ALBUMIN SERPL-MCNC: 4 G/DL (ref 3.5–5.2)
ALBUMIN/GLOB SERPL: 1.7 G/DL
ALP SERPL-CCNC: 64 U/L (ref 39–117)
ALT SERPL W P-5'-P-CCNC: 29 U/L (ref 1–33)
ANION GAP SERPL CALCULATED.3IONS-SCNC: 11 MMOL/L (ref 5–15)
AST SERPL-CCNC: 27 U/L (ref 1–32)
BASOPHILS # BLD AUTO: 0.02 10*3/MM3 (ref 0–0.2)
BASOPHILS NFR BLD AUTO: 0.6 % (ref 0–1.5)
BILIRUB SERPL-MCNC: 0.2 MG/DL (ref 0–1.2)
BUN SERPL-MCNC: 13 MG/DL (ref 6–20)
BUN/CREAT SERPL: 20.3 (ref 7–25)
CALCIUM SPEC-SCNC: 8.9 MG/DL (ref 8.6–10.5)
CHLORIDE SERPL-SCNC: 108 MMOL/L (ref 98–107)
CO2 SERPL-SCNC: 23 MMOL/L (ref 22–29)
CREAT SERPL-MCNC: 0.64 MG/DL (ref 0.57–1)
DEPRECATED RDW RBC AUTO: 52.3 FL (ref 37–54)
EGFRCR SERPLBLD CKD-EPI 2021: 103.9 ML/MIN/1.73
EOSINOPHIL # BLD AUTO: 0.11 10*3/MM3 (ref 0–0.4)
EOSINOPHIL NFR BLD AUTO: 3.2 % (ref 0.3–6.2)
ERYTHROCYTE [DISTWIDTH] IN BLOOD BY AUTOMATED COUNT: 13.2 % (ref 12.3–15.4)
GLOBULIN UR ELPH-MCNC: 2.3 GM/DL
GLUCOSE SERPL-MCNC: 105 MG/DL (ref 65–99)
HCT VFR BLD AUTO: 31.9 % (ref 34–46.6)
HGB BLD-MCNC: 10.8 G/DL (ref 12–15.9)
IMM GRANULOCYTES # BLD AUTO: 0.01 10*3/MM3 (ref 0–0.05)
IMM GRANULOCYTES NFR BLD AUTO: 0.3 % (ref 0–0.5)
LYMPHOCYTES # BLD AUTO: 1.07 10*3/MM3 (ref 0.7–3.1)
LYMPHOCYTES NFR BLD AUTO: 31.2 % (ref 19.6–45.3)
MCH RBC QN AUTO: 36.7 PG (ref 26.6–33)
MCHC RBC AUTO-ENTMCNC: 33.9 G/DL (ref 31.5–35.7)
MCV RBC AUTO: 108.5 FL (ref 79–97)
MONOCYTES # BLD AUTO: 0.41 10*3/MM3 (ref 0.1–0.9)
MONOCYTES NFR BLD AUTO: 12 % (ref 5–12)
NEUTROPHILS NFR BLD AUTO: 1.81 10*3/MM3 (ref 1.7–7)
NEUTROPHILS NFR BLD AUTO: 52.7 % (ref 42.7–76)
PLATELET # BLD AUTO: 153 10*3/MM3 (ref 140–450)
PMV BLD AUTO: 9.2 FL (ref 6–12)
POTASSIUM SERPL-SCNC: 4.2 MMOL/L (ref 3.5–5.2)
PROT SERPL-MCNC: 6.3 G/DL (ref 6–8.5)
RBC # BLD AUTO: 2.94 10*6/MM3 (ref 3.77–5.28)
SODIUM SERPL-SCNC: 142 MMOL/L (ref 136–145)
WBC NRBC COR # BLD AUTO: 3.43 10*3/MM3 (ref 3.4–10.8)

## 2024-03-07 PROCEDURE — 85025 COMPLETE CBC W/AUTO DIFF WBC: CPT | Performed by: NURSE PRACTITIONER

## 2024-03-07 PROCEDURE — 80053 COMPREHEN METABOLIC PANEL: CPT | Performed by: NURSE PRACTITIONER

## 2024-03-07 PROCEDURE — 99214 OFFICE O/P EST MOD 30 MIN: CPT | Performed by: INTERNAL MEDICINE

## 2024-03-07 PROCEDURE — 25010000002 HEPARIN LOCK FLUSH PER 10 UNITS: Performed by: INTERNAL MEDICINE

## 2024-03-07 PROCEDURE — 36591 DRAW BLOOD OFF VENOUS DEVICE: CPT

## 2024-03-07 RX ORDER — FAMOTIDINE 10 MG/ML
20 INJECTION, SOLUTION INTRAVENOUS ONCE
OUTPATIENT
Start: 2024-03-15

## 2024-03-07 RX ORDER — SODIUM CHLORIDE 9 MG/ML
20 INJECTION, SOLUTION INTRAVENOUS ONCE
OUTPATIENT
Start: 2024-03-15

## 2024-03-07 RX ORDER — SODIUM CHLORIDE 0.9 % (FLUSH) 0.9 %
10 SYRINGE (ML) INJECTION AS NEEDED
Status: CANCELLED | OUTPATIENT
Start: 2024-03-07

## 2024-03-07 RX ORDER — HEPARIN SODIUM (PORCINE) LOCK FLUSH IV SOLN 100 UNIT/ML 100 UNIT/ML
500 SOLUTION INTRAVENOUS AS NEEDED
Status: CANCELLED | OUTPATIENT
Start: 2024-03-07

## 2024-03-07 RX ORDER — FAMOTIDINE 10 MG/ML
20 INJECTION, SOLUTION INTRAVENOUS ONCE
OUTPATIENT
Start: 2024-03-22

## 2024-03-07 RX ORDER — DIPHENHYDRAMINE HYDROCHLORIDE 50 MG/ML
50 INJECTION INTRAMUSCULAR; INTRAVENOUS AS NEEDED
OUTPATIENT
Start: 2024-03-15

## 2024-03-07 RX ORDER — FAMOTIDINE 10 MG/ML
20 INJECTION, SOLUTION INTRAVENOUS AS NEEDED
OUTPATIENT
Start: 2024-03-15

## 2024-03-07 RX ORDER — SODIUM CHLORIDE 9 MG/ML
20 INJECTION, SOLUTION INTRAVENOUS ONCE
OUTPATIENT
Start: 2024-03-22

## 2024-03-07 RX ORDER — HEPARIN SODIUM (PORCINE) LOCK FLUSH IV SOLN 100 UNIT/ML 100 UNIT/ML
500 SOLUTION INTRAVENOUS AS NEEDED
Status: DISCONTINUED | OUTPATIENT
Start: 2024-03-07 | End: 2024-03-08 | Stop reason: HOSPADM

## 2024-03-07 RX ORDER — FAMOTIDINE 10 MG/ML
20 INJECTION, SOLUTION INTRAVENOUS AS NEEDED
OUTPATIENT
Start: 2024-03-22

## 2024-03-07 RX ORDER — DIPHENHYDRAMINE HYDROCHLORIDE 50 MG/ML
50 INJECTION INTRAMUSCULAR; INTRAVENOUS AS NEEDED
OUTPATIENT
Start: 2024-03-22

## 2024-03-07 RX ORDER — SODIUM CHLORIDE 0.9 % (FLUSH) 0.9 %
10 SYRINGE (ML) INJECTION AS NEEDED
Status: DISCONTINUED | OUTPATIENT
Start: 2024-03-07 | End: 2024-03-08 | Stop reason: HOSPADM

## 2024-03-07 RX ADMIN — HEPARIN 500 UNITS: 100 SYRINGE at 07:59

## 2024-03-07 RX ADMIN — Medication 10 ML: at 07:59

## 2024-03-07 NOTE — PROGRESS NOTES
"      PROBLEM LIST:  [id number] triple negative (her2 0+) invasive ductal carcinoma of the left breast  Biopsy of a 1.2 cm left breast mass on 10/4/23.  Pathology showed a high grade IDC.  Neoadjuvant chemotherapy with ddAC followed by taxol started 11/9/23    Subjective     CHIEF COMPLAINT: breast cancer    HISTORY OF PRESENT ILLNESS:   Swathi Cain returns for follow-up.     She has completed dose dense AC and 9 cycles of weekly Taxol.      She has been feeling pretty well.  She still trying to make a decision about lumpectomy versus mastectomy.  She has not had any further numbness or tingling in her fingers or toes.  Her nails are discolored and some of them starting to separate from the nailbed.          Objective      /74   Pulse 106   Temp 97.6 °F (36.4 °C) (Temporal)   Resp 16   Ht 165.1 cm (65\")   Wt 56.7 kg (125 lb)   SpO2 99%   BMI 20.80 kg/m²   Vitals:    03/07/24 0812   PainSc: 0-No pain                                 General: well appearing female in no acute distress  Neuro: alert and oriented  HEENT: sclera anicteric  Extremeties: no lower extremity edema  Skin: no rashes, lesions, bruising, or petechiae  Psych: mood and affect appropriate            RECENT LABS:  Lab Results   Component Value Date    WBC 3.43 03/07/2024    HGB 10.8 (L) 03/07/2024    HCT 31.9 (L) 03/07/2024    .5 (H) 03/07/2024     03/07/2024       Lab Results   Component Value Date    GLUCOSE 133 (H) 02/29/2024    BUN 16 02/29/2024    CREATININE 0.58 02/29/2024    BCR 27.6 (H) 02/29/2024    K 4.3 02/29/2024    CO2 24.0 02/29/2024    CALCIUM 8.9 02/29/2024    ALBUMIN 4.2 02/29/2024    AST 24 02/29/2024    ALT 29 02/29/2024                 ASSESSMENT AND PLAN:     Swathi Cain is a 58 y.o. female with a T1 cN0 M0 triple negative invasive ductal carcinoma of the left breast.    She has completed 9 cycles of weekly Taxol.  She is doing well with treatment overall.  I will get her scheduled to see Dr." John to begin discussing surgery.  We spent some time discussing the rationale for mastectomy versus lumpectomy.  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  We will proceed with cycle 10 of Taxol today.    She continues to be a part of the cryo compression study.    I will see her back following surgery to review pathology.    Total time of patient care on day of service including time prior to, face to face with patient, and following visit spent in reviewing records, lab results, discussion with patient, and documentation/charting was > 31 minutes.                      Marga Arreola MD  [facility]    3/7/2024          CC:

## 2024-03-08 ENCOUNTER — HOSPITAL ENCOUNTER (OUTPATIENT)
Dept: ONCOLOGY | Facility: HOSPITAL | Age: 59
Discharge: HOME | End: 2024-03-08
Payer: COMMERCIAL

## 2024-03-08 ENCOUNTER — RESEARCH ENCOUNTER (OUTPATIENT)
Dept: OTHER | Facility: OTHER | Age: 59
End: 2024-03-08
Payer: COMMERCIAL

## 2024-03-08 VITALS
TEMPERATURE: 98 F | BODY MASS INDEX: 21.16 KG/M2 | HEIGHT: 65 IN | DIASTOLIC BLOOD PRESSURE: 77 MMHG | HEART RATE: 90 BPM | WEIGHT: 127 LBS | SYSTOLIC BLOOD PRESSURE: 124 MMHG | RESPIRATION RATE: 16 BRPM

## 2024-03-08 DIAGNOSIS — C50.812 MALIGNANT NEOPLASM OF OVERLAPPING SITES OF LEFT BREAST IN FEMALE, ESTROGEN RECEPTOR NEGATIVE: Primary | ICD-10-CM

## 2024-03-08 DIAGNOSIS — Z17.1 MALIGNANT NEOPLASM OF OVERLAPPING SITES OF LEFT BREAST IN FEMALE, ESTROGEN RECEPTOR NEGATIVE: Primary | ICD-10-CM

## 2024-03-08 PROCEDURE — 25810000003 SODIUM CHLORIDE 0.9 % SOLUTION 250 ML FLEX CONT: Performed by: NURSE PRACTITIONER

## 2024-03-08 PROCEDURE — 25010000002 DEXAMETHASONE SODIUM PHOSPHATE 100 MG/10ML SOLUTION: Performed by: NURSE PRACTITIONER

## 2024-03-08 PROCEDURE — 25010000002 DIPHENHYDRAMINE PER 50 MG: Performed by: NURSE PRACTITIONER

## 2024-03-08 PROCEDURE — 25010000002 PACLITAXEL PER 1 MG: Performed by: NURSE PRACTITIONER

## 2024-03-08 PROCEDURE — 25010000002 HEPARIN LOCK FLUSH PER 10 UNITS: Performed by: INTERNAL MEDICINE

## 2024-03-08 PROCEDURE — 25810000003 SODIUM CHLORIDE 0.9 % SOLUTION: Performed by: NURSE PRACTITIONER

## 2024-03-08 PROCEDURE — 96413 CHEMO IV INFUSION 1 HR: CPT

## 2024-03-08 PROCEDURE — 96375 TX/PRO/DX INJ NEW DRUG ADDON: CPT

## 2024-03-08 RX ORDER — FAMOTIDINE 10 MG/ML
20 INJECTION, SOLUTION INTRAVENOUS ONCE
Status: COMPLETED | OUTPATIENT
Start: 2024-03-08 | End: 2024-03-08

## 2024-03-08 RX ORDER — HEPARIN SODIUM (PORCINE) LOCK FLUSH IV SOLN 100 UNIT/ML 100 UNIT/ML
500 SOLUTION INTRAVENOUS AS NEEDED
OUTPATIENT
Start: 2024-03-08

## 2024-03-08 RX ORDER — SODIUM CHLORIDE 9 MG/ML
20 INJECTION, SOLUTION INTRAVENOUS ONCE
Status: COMPLETED | OUTPATIENT
Start: 2024-03-08 | End: 2024-03-08

## 2024-03-08 RX ORDER — SODIUM CHLORIDE 0.9 % (FLUSH) 0.9 %
10 SYRINGE (ML) INJECTION AS NEEDED
OUTPATIENT
Start: 2024-03-08

## 2024-03-08 RX ORDER — HEPARIN SODIUM (PORCINE) LOCK FLUSH IV SOLN 100 UNIT/ML 100 UNIT/ML
500 SOLUTION INTRAVENOUS AS NEEDED
Status: DISCONTINUED | OUTPATIENT
Start: 2024-03-08 | End: 2024-03-09 | Stop reason: HOSPADM

## 2024-03-08 RX ADMIN — DEXAMETHASONE SODIUM PHOSPHATE 12 MG: 10 INJECTION, SOLUTION INTRAMUSCULAR; INTRAVENOUS at 11:00

## 2024-03-08 RX ADMIN — SODIUM CHLORIDE 130 MG: 9 INJECTION, SOLUTION INTRAVENOUS at 12:00

## 2024-03-08 RX ADMIN — SODIUM CHLORIDE 20 ML/HR: 9 INJECTION, SOLUTION INTRAVENOUS at 11:00

## 2024-03-08 RX ADMIN — DIPHENHYDRAMINE HYDROCHLORIDE 25 MG: 50 INJECTION INTRAMUSCULAR; INTRAVENOUS at 11:09

## 2024-03-08 RX ADMIN — HEPARIN 500 UNITS: 100 SYRINGE at 13:51

## 2024-03-08 RX ADMIN — FAMOTIDINE 20 MG: 10 INJECTION, SOLUTION INTRAVENOUS at 11:09

## 2024-03-08 NOTE — RESEARCH
Patient Name:  Swathi Cain  YOB: 1965  Patient Age:  58 y.o.  Patient's Sex:  female    Date of Service:  03/08/2024    Provider:  ADAM Arreola MD                     RESEARCH NOTE                  Protocol --ICE COMPRESS: Randomized Trial of Limb Cryocompression Versus Continuous Compression Versus Low Cyclic Compression for the Prevention of Taxane-Induced Peripheral Neuropathy   NCT #[id number]     Subject ID:  [id number]  ARM 3:  Low Cyclic Compression     Participant here for C10D1 of Taxol.  Planned every week x 12 cycles.     Randomized to low cyclic compression.    Port in place.  All four extremities used for device intervention.  Upon assessment, there were no signs of redness, open wounds or break in skin integrity prior to start of device intervention.  Participant tolerated device intervention without any adjustments. No AEs observed.  Pt does not have any complaints of peripheral neuropathy.     RTC in 1 week for #11/12 Taxol infusions and device intervention.       Thank you.  Rosio Moise, RN, BSN, CRC

## 2024-03-14 ENCOUNTER — HOSPITAL ENCOUNTER (OUTPATIENT)
Dept: ONCOLOGY | Facility: HOSPITAL | Age: 59
Discharge: HOME | End: 2024-03-14
Payer: COMMERCIAL

## 2024-03-14 ENCOUNTER — RESEARCH ENCOUNTER (OUTPATIENT)
Dept: OTHER | Facility: OTHER | Age: 59
End: 2024-03-14
Payer: COMMERCIAL

## 2024-03-14 ENCOUNTER — APPOINTMENT (OUTPATIENT)
Dept: ONCOLOGY | Facility: HOSPITAL | Age: 59
End: 2024-03-14
Payer: COMMERCIAL

## 2024-03-14 VITALS
WEIGHT: 126.9 LBS | TEMPERATURE: 98.1 F | RESPIRATION RATE: 16 BRPM | DIASTOLIC BLOOD PRESSURE: 64 MMHG | HEIGHT: 65 IN | BODY MASS INDEX: 21.14 KG/M2 | HEART RATE: 83 BPM | SYSTOLIC BLOOD PRESSURE: 112 MMHG

## 2024-03-14 DIAGNOSIS — C50.812 MALIGNANT NEOPLASM OF OVERLAPPING SITES OF LEFT BREAST IN FEMALE, ESTROGEN RECEPTOR NEGATIVE: Primary | ICD-10-CM

## 2024-03-14 DIAGNOSIS — Z17.1 MALIGNANT NEOPLASM OF OVERLAPPING SITES OF LEFT BREAST IN FEMALE, ESTROGEN RECEPTOR NEGATIVE: Primary | ICD-10-CM

## 2024-03-14 LAB
ALBUMIN SERPL-MCNC: 4.3 G/DL (ref 3.5–5.2)
ALBUMIN/GLOB SERPL: 2.5 G/DL
ALP SERPL-CCNC: 65 U/L (ref 39–117)
ALT SERPL W P-5'-P-CCNC: 28 U/L (ref 1–33)
ANION GAP SERPL CALCULATED.3IONS-SCNC: 8 MMOL/L (ref 5–15)
AST SERPL-CCNC: 27 U/L (ref 1–32)
BASOPHILS # BLD AUTO: 0.01 10*3/MM3 (ref 0–0.2)
BASOPHILS NFR BLD AUTO: 0.4 % (ref 0–1.5)
BILIRUB SERPL-MCNC: 0.3 MG/DL (ref 0–1.2)
BUN SERPL-MCNC: 12 MG/DL (ref 6–20)
BUN/CREAT SERPL: 20.3 (ref 7–25)
CALCIUM SPEC-SCNC: 8.7 MG/DL (ref 8.6–10.5)
CHLORIDE SERPL-SCNC: 107 MMOL/L (ref 98–107)
CO2 SERPL-SCNC: 25 MMOL/L (ref 22–29)
CREAT SERPL-MCNC: 0.59 MG/DL (ref 0.57–1)
DEPRECATED RDW RBC AUTO: 53.2 FL (ref 37–54)
EGFRCR SERPLBLD CKD-EPI 2021: 105.9 ML/MIN/1.73
EOSINOPHIL # BLD AUTO: 0.1 10*3/MM3 (ref 0–0.4)
EOSINOPHIL NFR BLD AUTO: 3.5 % (ref 0.3–6.2)
ERYTHROCYTE [DISTWIDTH] IN BLOOD BY AUTOMATED COUNT: 13.3 % (ref 12.3–15.4)
GLOBULIN UR ELPH-MCNC: 1.7 GM/DL
GLUCOSE SERPL-MCNC: 104 MG/DL (ref 65–99)
HCT VFR BLD AUTO: 33 % (ref 34–46.6)
HGB BLD-MCNC: 11.2 G/DL (ref 12–15.9)
IMM GRANULOCYTES # BLD AUTO: 0.01 10*3/MM3 (ref 0–0.05)
IMM GRANULOCYTES NFR BLD AUTO: 0.4 % (ref 0–0.5)
LYMPHOCYTES # BLD AUTO: 0.93 10*3/MM3 (ref 0.7–3.1)
LYMPHOCYTES NFR BLD AUTO: 33 % (ref 19.6–45.3)
MCH RBC QN AUTO: 36.7 PG (ref 26.6–33)
MCHC RBC AUTO-ENTMCNC: 33.9 G/DL (ref 31.5–35.7)
MCV RBC AUTO: 108.2 FL (ref 79–97)
MONOCYTES # BLD AUTO: 0.27 10*3/MM3 (ref 0.1–0.9)
MONOCYTES NFR BLD AUTO: 9.6 % (ref 5–12)
NEUTROPHILS NFR BLD AUTO: 1.5 10*3/MM3 (ref 1.7–7)
NEUTROPHILS NFR BLD AUTO: 53.1 % (ref 42.7–76)
PLATELET # BLD AUTO: 162 10*3/MM3 (ref 140–450)
PMV BLD AUTO: 9.5 FL (ref 6–12)
POTASSIUM SERPL-SCNC: 4.3 MMOL/L (ref 3.5–5.2)
PROT SERPL-MCNC: 6 G/DL (ref 6–8.5)
RBC # BLD AUTO: 3.05 10*6/MM3 (ref 3.77–5.28)
SODIUM SERPL-SCNC: 140 MMOL/L (ref 136–145)
WBC NRBC COR # BLD AUTO: 2.82 10*3/MM3 (ref 3.4–10.8)

## 2024-03-14 PROCEDURE — 25010000002 PACLITAXEL PER 1 MG: Performed by: INTERNAL MEDICINE

## 2024-03-14 PROCEDURE — 25010000002 DIPHENHYDRAMINE PER 50 MG: Performed by: INTERNAL MEDICINE

## 2024-03-14 PROCEDURE — 25810000003 SODIUM CHLORIDE 0.9 % SOLUTION: Performed by: INTERNAL MEDICINE

## 2024-03-14 PROCEDURE — 85025 COMPLETE CBC W/AUTO DIFF WBC: CPT | Performed by: INTERNAL MEDICINE

## 2024-03-14 PROCEDURE — 96413 CHEMO IV INFUSION 1 HR: CPT

## 2024-03-14 PROCEDURE — 25810000003 SODIUM CHLORIDE 0.9 % SOLUTION 250 ML FLEX CONT: Performed by: INTERNAL MEDICINE

## 2024-03-14 PROCEDURE — 96375 TX/PRO/DX INJ NEW DRUG ADDON: CPT

## 2024-03-14 PROCEDURE — 25010000002 DEXAMETHASONE SODIUM PHOSPHATE 100 MG/10ML SOLUTION: Performed by: INTERNAL MEDICINE

## 2024-03-14 PROCEDURE — 25010000002 HEPARIN LOCK FLUSH PER 10 UNITS: Performed by: INTERNAL MEDICINE

## 2024-03-14 PROCEDURE — 80053 COMPREHEN METABOLIC PANEL: CPT | Performed by: INTERNAL MEDICINE

## 2024-03-14 RX ORDER — FAMOTIDINE 10 MG/ML
20 INJECTION, SOLUTION INTRAVENOUS ONCE
Status: COMPLETED | OUTPATIENT
Start: 2024-03-14 | End: 2024-03-14

## 2024-03-14 RX ORDER — HEPARIN SODIUM (PORCINE) LOCK FLUSH IV SOLN 100 UNIT/ML 100 UNIT/ML
500 SOLUTION INTRAVENOUS AS NEEDED
OUTPATIENT
Start: 2024-03-14

## 2024-03-14 RX ORDER — SODIUM CHLORIDE 9 MG/ML
20 INJECTION, SOLUTION INTRAVENOUS ONCE
Status: COMPLETED | OUTPATIENT
Start: 2024-03-14 | End: 2024-03-14

## 2024-03-14 RX ORDER — SODIUM CHLORIDE 0.9 % (FLUSH) 0.9 %
10 SYRINGE (ML) INJECTION AS NEEDED
OUTPATIENT
Start: 2024-03-14

## 2024-03-14 RX ORDER — HEPARIN SODIUM (PORCINE) LOCK FLUSH IV SOLN 100 UNIT/ML 100 UNIT/ML
500 SOLUTION INTRAVENOUS AS NEEDED
Status: DISCONTINUED | OUTPATIENT
Start: 2024-03-14 | End: 2024-03-15 | Stop reason: HOSPADM

## 2024-03-14 RX ADMIN — SODIUM CHLORIDE 130 MG: 9 INJECTION, SOLUTION INTRAVENOUS at 10:43

## 2024-03-14 RX ADMIN — DIPHENHYDRAMINE HYDROCHLORIDE 25 MG: 50 INJECTION INTRAMUSCULAR; INTRAVENOUS at 10:11

## 2024-03-14 RX ADMIN — SODIUM CHLORIDE 20 ML/HR: 9 INJECTION, SOLUTION INTRAVENOUS at 09:55

## 2024-03-14 RX ADMIN — DEXAMETHASONE SODIUM PHOSPHATE 12 MG: 10 INJECTION, SOLUTION INTRAMUSCULAR; INTRAVENOUS at 10:00

## 2024-03-14 RX ADMIN — FAMOTIDINE 20 MG: 10 INJECTION, SOLUTION INTRAVENOUS at 10:11

## 2024-03-14 RX ADMIN — HEPARIN 500 UNITS: 100 SYRINGE at 11:50

## 2024-03-14 NOTE — RESEARCH
Patient Name:  Swathi Cain  YOB: 1965  Patient Age:  58 y.o.  Patient's Sex:  female    Date of Service:  03/14/2024    Provider:  ADAM Arreola MD                     RESEARCH NOTE                  Protocol --ICE COMPRESS: Randomized Trial of Limb Cryocompression Versus Continuous Compression Versus Low Cyclic Compression for the Prevention of Taxane-Induced Peripheral Neuropathy   NCT #[id number]     Subject ID:  [id number]  ARM 3:  Low Cyclic Compression     Participant here for C11D1 of Taxol.  Planned every week x 12 cycles.     Randomized to low cyclic compression.    Port in place.  All four extremities used for device intervention.  Upon assessment, there were no signs of redness, open wounds or break in skin integrity prior to start of device intervention.  Participant tolerated device intervention without any adjustments. No AEs observed.  Pt does not have any complaints of peripheral neuropathy.     RTC in 1 week for #12/12 Taxol infusions and device intervention.       Thank you.  Rosio Moise, RN, BSN, CRC

## 2024-03-21 ENCOUNTER — HOSPITAL ENCOUNTER (OUTPATIENT)
Dept: ONCOLOGY | Facility: HOSPITAL | Age: 59
Discharge: HOME | End: 2024-03-21
Payer: COMMERCIAL

## 2024-03-21 ENCOUNTER — RESEARCH ENCOUNTER (OUTPATIENT)
Dept: OTHER | Facility: OTHER | Age: 59
End: 2024-03-21
Payer: COMMERCIAL

## 2024-03-21 ENCOUNTER — APPOINTMENT (OUTPATIENT)
Dept: ONCOLOGY | Facility: HOSPITAL | Age: 59
End: 2024-03-21
Payer: COMMERCIAL

## 2024-03-21 VITALS
BODY MASS INDEX: 21.33 KG/M2 | WEIGHT: 128 LBS | TEMPERATURE: 97.6 F | DIASTOLIC BLOOD PRESSURE: 65 MMHG | HEIGHT: 65 IN | SYSTOLIC BLOOD PRESSURE: 120 MMHG | HEART RATE: 90 BPM | RESPIRATION RATE: 18 BRPM

## 2024-03-21 DIAGNOSIS — C50.812 MALIGNANT NEOPLASM OF OVERLAPPING SITES OF LEFT BREAST IN FEMALE, ESTROGEN RECEPTOR NEGATIVE: Primary | ICD-10-CM

## 2024-03-21 DIAGNOSIS — Z17.1 MALIGNANT NEOPLASM OF OVERLAPPING SITES OF LEFT BREAST IN FEMALE, ESTROGEN RECEPTOR NEGATIVE: Primary | ICD-10-CM

## 2024-03-21 LAB
ALBUMIN SERPL-MCNC: 4.2 G/DL (ref 3.5–5.2)
ALBUMIN/GLOB SERPL: 2 G/DL
ALP SERPL-CCNC: 65 U/L (ref 39–117)
ALT SERPL W P-5'-P-CCNC: 42 U/L (ref 1–33)
ANION GAP SERPL CALCULATED.3IONS-SCNC: 8 MMOL/L (ref 5–15)
AST SERPL-CCNC: 35 U/L (ref 1–32)
BASOPHILS # BLD AUTO: 0.02 10*3/MM3 (ref 0–0.2)
BASOPHILS NFR BLD AUTO: 0.5 % (ref 0–1.5)
BILIRUB SERPL-MCNC: 0.2 MG/DL (ref 0–1.2)
BUN SERPL-MCNC: 10 MG/DL (ref 6–20)
BUN/CREAT SERPL: 15.2 (ref 7–25)
CALCIUM SPEC-SCNC: 8.6 MG/DL (ref 8.6–10.5)
CHLORIDE SERPL-SCNC: 107 MMOL/L (ref 98–107)
CO2 SERPL-SCNC: 25 MMOL/L (ref 22–29)
CREAT SERPL-MCNC: 0.66 MG/DL (ref 0.57–1)
DEPRECATED RDW RBC AUTO: 54 FL (ref 37–54)
EGFRCR SERPLBLD CKD-EPI 2021: 103.1 ML/MIN/1.73
EOSINOPHIL # BLD AUTO: 0.1 10*3/MM3 (ref 0–0.4)
EOSINOPHIL NFR BLD AUTO: 2.7 % (ref 0.3–6.2)
ERYTHROCYTE [DISTWIDTH] IN BLOOD BY AUTOMATED COUNT: 13.4 % (ref 12.3–15.4)
GLOBULIN UR ELPH-MCNC: 2.1 GM/DL
GLUCOSE SERPL-MCNC: 107 MG/DL (ref 65–99)
HCT VFR BLD AUTO: 33.7 % (ref 34–46.6)
HGB BLD-MCNC: 11.3 G/DL (ref 12–15.9)
IMM GRANULOCYTES # BLD AUTO: 0.01 10*3/MM3 (ref 0–0.05)
IMM GRANULOCYTES NFR BLD AUTO: 0.3 % (ref 0–0.5)
LYMPHOCYTES # BLD AUTO: 1.18 10*3/MM3 (ref 0.7–3.1)
LYMPHOCYTES NFR BLD AUTO: 32.2 % (ref 19.6–45.3)
MCH RBC QN AUTO: 36.6 PG (ref 26.6–33)
MCHC RBC AUTO-ENTMCNC: 33.5 G/DL (ref 31.5–35.7)
MCV RBC AUTO: 109.1 FL (ref 79–97)
MONOCYTES # BLD AUTO: 0.42 10*3/MM3 (ref 0.1–0.9)
MONOCYTES NFR BLD AUTO: 11.4 % (ref 5–12)
NEUTROPHILS NFR BLD AUTO: 1.94 10*3/MM3 (ref 1.7–7)
NEUTROPHILS NFR BLD AUTO: 52.9 % (ref 42.7–76)
PLATELET # BLD AUTO: 155 10*3/MM3 (ref 140–450)
PMV BLD AUTO: 8.9 FL (ref 6–12)
POTASSIUM SERPL-SCNC: 4.3 MMOL/L (ref 3.5–5.2)
PROT SERPL-MCNC: 6.3 G/DL (ref 6–8.5)
RBC # BLD AUTO: 3.09 10*6/MM3 (ref 3.77–5.28)
SODIUM SERPL-SCNC: 140 MMOL/L (ref 136–145)
WBC NRBC COR # BLD AUTO: 3.67 10*3/MM3 (ref 3.4–10.8)

## 2024-03-21 PROCEDURE — 25010000002 PACLITAXEL PER 1 MG: Performed by: INTERNAL MEDICINE

## 2024-03-21 PROCEDURE — 25010000002 DEXAMETHASONE SODIUM PHOSPHATE 100 MG/10ML SOLUTION: Performed by: INTERNAL MEDICINE

## 2024-03-21 PROCEDURE — 80053 COMPREHEN METABOLIC PANEL: CPT | Performed by: INTERNAL MEDICINE

## 2024-03-21 PROCEDURE — 25010000002 DIPHENHYDRAMINE PER 50 MG: Performed by: INTERNAL MEDICINE

## 2024-03-21 PROCEDURE — 25810000003 SODIUM CHLORIDE 0.9 % SOLUTION 250 ML FLEX CONT: Performed by: INTERNAL MEDICINE

## 2024-03-21 PROCEDURE — 96413 CHEMO IV INFUSION 1 HR: CPT

## 2024-03-21 PROCEDURE — 96375 TX/PRO/DX INJ NEW DRUG ADDON: CPT

## 2024-03-21 PROCEDURE — 25810000003 SODIUM CHLORIDE 0.9 % SOLUTION: Performed by: INTERNAL MEDICINE

## 2024-03-21 PROCEDURE — 85025 COMPLETE CBC W/AUTO DIFF WBC: CPT | Performed by: INTERNAL MEDICINE

## 2024-03-21 PROCEDURE — 25010000002 HEPARIN LOCK FLUSH PER 10 UNITS: Performed by: INTERNAL MEDICINE

## 2024-03-21 RX ORDER — SODIUM CHLORIDE 9 MG/ML
20 INJECTION, SOLUTION INTRAVENOUS ONCE
Status: COMPLETED | OUTPATIENT
Start: 2024-03-21 | End: 2024-03-21

## 2024-03-21 RX ORDER — HEPARIN SODIUM (PORCINE) LOCK FLUSH IV SOLN 100 UNIT/ML 100 UNIT/ML
500 SOLUTION INTRAVENOUS AS NEEDED
Status: DISCONTINUED | OUTPATIENT
Start: 2024-03-21 | End: 2024-03-22 | Stop reason: HOSPADM

## 2024-03-21 RX ORDER — SODIUM CHLORIDE 0.9 % (FLUSH) 0.9 %
10 SYRINGE (ML) INJECTION AS NEEDED
OUTPATIENT
Start: 2024-03-21

## 2024-03-21 RX ORDER — DIPHENHYDRAMINE HYDROCHLORIDE 50 MG/ML
50 INJECTION INTRAMUSCULAR; INTRAVENOUS AS NEEDED
Status: DISCONTINUED | OUTPATIENT
Start: 2024-03-21 | End: 2024-03-22 | Stop reason: HOSPADM

## 2024-03-21 RX ORDER — HEPARIN SODIUM (PORCINE) LOCK FLUSH IV SOLN 100 UNIT/ML 100 UNIT/ML
500 SOLUTION INTRAVENOUS AS NEEDED
OUTPATIENT
Start: 2024-03-21

## 2024-03-21 RX ORDER — FAMOTIDINE 10 MG/ML
20 INJECTION, SOLUTION INTRAVENOUS ONCE
Status: COMPLETED | OUTPATIENT
Start: 2024-03-21 | End: 2024-03-21

## 2024-03-21 RX ORDER — FAMOTIDINE 10 MG/ML
20 INJECTION, SOLUTION INTRAVENOUS AS NEEDED
Status: DISCONTINUED | OUTPATIENT
Start: 2024-03-21 | End: 2024-03-22 | Stop reason: HOSPADM

## 2024-03-21 RX ADMIN — PACLITAXEL 130 MG: 6 INJECTION, SOLUTION, CONCENTRATE INTRAVENOUS at 10:18

## 2024-03-21 RX ADMIN — DIPHENHYDRAMINE HYDROCHLORIDE 25 MG: 50 INJECTION INTRAMUSCULAR; INTRAVENOUS at 09:23

## 2024-03-21 RX ADMIN — DEXAMETHASONE SODIUM PHOSPHATE 12 MG: 10 INJECTION, SOLUTION INTRAMUSCULAR; INTRAVENOUS at 09:23

## 2024-03-21 RX ADMIN — HEPARIN 500 UNITS: 100 SYRINGE at 11:55

## 2024-03-21 RX ADMIN — SODIUM CHLORIDE 20 ML/HR: 9 INJECTION, SOLUTION INTRAVENOUS at 09:20

## 2024-03-21 RX ADMIN — FAMOTIDINE 20 MG: 10 INJECTION, SOLUTION INTRAVENOUS at 09:23

## 2024-03-21 NOTE — RESEARCH
Patient Name:  Swathi Cain  YOB: 1965  Patient Age:  58 y.o.  Patient's Sex:  female    Date of Service:  03/21/2024    Provider:  Dr. Arreola                     RESEARCH NOTE                  Protocol --ICE COMPRESS: Randomized Trial of Limb Cryocompression Versus Continuous Compression Versus Low Cyclic Compression for the Prevention of Taxane-Induced Peripheral Neuropathy   NCT #[id number]     Subject ID:  [id number]  ARM:  Low Cyclic Compression     Week 12 Assessment Timepoint:  Required QOLs were completed prior to treatment.    The following neuroassessments were also completed:  Neuropen (monofilament and Neurotip)  [place] ([address])  Timed Get Up and Go    The participant agreed to specimen biobanking which was collected and processed per protocol.    Medications and AEs reviewed.    Upon assessment, there were no signs of redness, open wounds or break in skin integrity prior to start of device intervention today.  All extremities were used for device intervention today.    Participant tolerated device intervention without any problems or need for adjustment.      This was cycle 12 of 12. Patient has completed study intervention portion of  study. Told patient Rosio, primary CRC, will be in touch regarding next visits. Patient verbalized understanding.     Kavita MARIEN, RN

## 2024-04-03 ENCOUNTER — HOSPITAL ENCOUNTER (OUTPATIENT)
Dept: PHYSICAL THERAPY | Facility: HOSPITAL | Age: 59
Setting detail: THERAPIES SERIES
Discharge: HOME | End: 2024-04-03
Payer: COMMERCIAL

## 2024-04-03 DIAGNOSIS — C50.912 MALIGNANT NEOPLASM OF LEFT FEMALE BREAST, UNSPECIFIED ESTROGEN RECEPTOR STATUS, UNSPECIFIED SITE OF BREAST: Primary | ICD-10-CM

## 2024-04-03 PROCEDURE — 93702 BIS XTRACELL FLUID ANALYSIS: CPT

## 2024-04-03 PROCEDURE — 97162 PT EVAL MOD COMPLEX 30 MIN: CPT

## 2024-04-04 ENCOUNTER — TRANSCRIBE ORDERS (OUTPATIENT)
Dept: ADMINISTRATIVE | Facility: HOSPITAL | Age: 59
End: 2024-04-04
Payer: COMMERCIAL

## 2024-04-04 DIAGNOSIS — C50.512 MALIGNANT NEOPLASM OF LOWER-OUTER QUADRANT OF LEFT FEMALE BREAST, UNSPECIFIED ESTROGEN RECEPTOR STATUS: Primary | ICD-10-CM

## 2024-04-04 NOTE — THERAPY DISCHARGE NOTE
Outpatient Physical Therapy Lymphedema Initial Evaluation & Discharge  [facility]     Patient Name: Swathi Cain  : 1965  MRN: [id number]  Today's Date: 2024      Visit Date: 2024    Visit Dx:    ICD-10-CM ICD-9-CM   1. Malignant neoplasm of left female breast, unspecified estrogen receptor status, unspecified site of breast  C50.912 174.9       Patient Active Problem List   Diagnosis    Malignant neoplasm of overlapping sites of left breast in female, estrogen receptor negative        No past medical history on file.     No past surgical history on file.         Lymphedema       Row Name 24 1410             Subjective Pain    Able to rate subjective pain? yes  -CA      Pre-Treatment Pain Level 0  -CA      Post-Treatment Pain Level 0  -CA         Subjective    Subjective Comments Pt presents for presurgical baseline assessment after BrCA dx. She has undergo chemotherapy and will be undergoing B mastectomy with L SLNB. She notes fatigue in relation to her chemo but feels that she did very well beyond the fatigue.  -CA         Lymphedema Assessment    Lymphedema Classification LUE:;at risk/stage 0  -CA      Lymphedema Surgery Comments surgery is TBD  -CA         Posture/Observations    Posture- WNL Posture is WNL  -CA      Posture/Observations Comments Favor slouched sitting when back is not supported, but reaches neutral posture with ease  -CA         General ROM    RT Upper Ext Rt Shoulder ABduction;Rt Shoulder Flexion;Rt Shoulder External Rotation;Rt Shoulder Internal Rotation  -CA      LT Upper Ext Lt Shoulder Flexion;Lt Shoulder External Rotation;Lt Shoulder Internal Rotation;Lt Shoulder ABduction  -CA      GENERAL ROM COMMENTS WFL wrist/hand  -CA         Right Upper Ext    Rt Shoulder Abduction AROM WFL  -CA      Rt Shoulder Flexion AROM WFL  -CA      Rt Shoulder External Rotation AROM WFL  -CA      Rt Shoulder Internal Rotation AROM WFL  -CA         Left Upper Ext    Lt Shoulder  Abduction AROM WFL  -CA      Lt Shoulder Flexion AROM WFL  -CA      Lt Shoulder External Rotation AROM WFL  -CA      Lt Shoulder Internal Rotation AROM WFL  -CA         MMT (Manual Muscle Testing)    Rt Upper Ext Rt Shoulder Flexion;Rt Shoulder ABduction;Rt Shoulder Internal Rotation;Rt Shoulder External Rotation  -CA      Lt Upper Ext Lt Shoulder Flexion;Lt Shoulder ABduction;Lt Shoulder Internal Rotation;Lt Shoulder External Rotation  -CA         MMT Right Upper Ext    Rt Shoulder Flexion MMT, Gross Movement (4/5) good  -CA      Rt Shoulder ABduction MMT, Gross Movement (4/5) good  -CA      Rt Shoulder Internal Rotation MMT, Gross Movement (4/5) good  -CA      Rt Shoulder External Rotation MMT, Gross Movement (4/5) good  -CA         MMT Left Upper Ext    Lt Shoulder Flexion MMT, Gross Movement (4/5) good  -CA      Lt Shoulder ABduction MMT, Gross Movement (4/5) good  -CA      Lt Shoulder Internal Rotation MMT, Gross Movement (4/5) good  -CA      Lt Shoulder External Rotation MMT, Gross Movement (4/5) good  -CA         Hand  Strength     Strength Affected Side Bilateral  -CA          Strength Right    Right  Test 1 75  -CA      Right  Test 2 70  -CA      Right  Test 3 70  -CA       Strength Average Right 71.67  -CA          Strength Left    Left  Test 1 58  -CA      Left  Test 2 60  -CA      Left  Test 3 62  -CA       Strength Average Left 60  -CA         Lymphedema Edema Assessment    Edema Assessment Comment No edema present at this time.  -CA         Skin Changes/Observations    Location/Assessment Upper Extremity  -CA      Upper Extremity Conditions bilateral:;normal;intact  -CA      Upper Extremity Color/Pigment bilateral:;normal  -CA         Lymphedema Sensation    Lymphedema Sensation Reports RUE:;LUE:  -CA      Lymphedema Sensation Tests light touch  -CA      Lymphedema Light Touch RUE:;LUE:;WNL  -CA         L-Dex Bioimpedence Screening    L-Dex Measurement  Extremity LUE  -CA      L-Dex Patient Position Standing  -CA      L-Dex UE Dominate Side Right  -CA      L-Dex UE At Risk Side Left  -CA      L-Dex UE Pre Surgical Value Yes  -CA      L-Dex UE Score -2  -CA      L-Dex UE Baseline Score -2  -CA      L-Dex UE Value Change 0  -CA      L-Dex UE Comment The patient had SOZO measurement which I reviewed today. The score is in normal limits, see scanned to EMR. Bioimpedance spectroscopy helps identify the onset of lymphedema in an arm or leg before patients experience noticeable swelling. Research has shown that the early detection of lymphedema using L-Dex combined with treatment can reduce progression to chronic lymphedema by 95% in breast cancer patients. Whenever possible, patients are tested for baseline L-Dex score before cancer treatment begins and then are reassessed during regular follow-up visits using the SOZO device. Otherwise, this can be started postoperatively and continued during regular follow-up visits. If the patient’s L-Dex score increases above normal levels, that is a sign that lymphedema is developing and a referral is made to occupational or physical therapy for further evaluation and early compression treatment. Lymphedema assessment with the SOZO L-Dex score is recommended to be done every 3 months for the first 3 years and then every 6 months for years 4 and 5 followed by annually afterwards.  -CA      Skeletal Muscle Mass (%) 31.4 %  -CA      Fat Mass (%) 27.4 %  -CA      Hy-dex 15.7  -CA                User Key  (r) = Recorded By, (t) = Taken By, (c) = Cosigned By      Initials Name Provider Type    Andra Lacey PT Physical Therapist                    Therapy Education  Education Details: Pt educated on prehab evaluation assessments including bioimpedance. Pt was provided an HEP detailing information including lymphedema, risk reduction, and post op exercise. Advised on cardiovascular and global resistance recommendations by the ACSM  to address fatigue and pain.  Given: HEP, Symptoms/condition management, Posture/body mechanics  Program: New  How Provided: Verbal, Written  Provided to: Patient (and family)  Level of Understanding: Verbalized     OP Exercises       Row Name 04/03/24 1410             Subjective    Subjective Comments Pt presents for presurgical baseline assessment after BrCA dx. She has undergo chemotherapy and will be undergoing B mastectomy with L SLNB. She notes fatigue in relation to her chemo but feels that she did very well beyond the fatigue.  -CA         Subjective Pain    Able to rate subjective pain? yes  -CA      Pre-Treatment Pain Level 0  -CA      Post-Treatment Pain Level 0  -CA         Exercise 1    Exercise Name 1 Elbow flexion/extension  -CA      Additional Comments 3-4x/day, HEP level 1- post op day 1 to day 7  -CA         Exercise 2    Exercise Name 2 Fist/wrist circles  -CA      Reps 2 x10  -CA      Additional Comments 3-4x/day, HEP level 1- post op day 1 to day 7  -CA         Exercise 3    Exercise Name 3 Neck Flexion/extension/rotation/lateral flexion  -CA      Reps 3 x10  -CA      Additional Comments 3-4x/day, HEP level 1- post op day 1 to day 7  -CA         Exercise 4    Exercise Name 4 horizontal abduction with hands behind neck  -CA      Reps 4 x10  -CA      Time 4 5 seconds  -CA      Additional Comments 3-4x/day, HEP level 2- post op day 7 to day 14  -CA         Exercise 5    Exercise Name 5 lumbar trunk rotration  -CA      Reps 5 x10  -CA      Time 5 5 seconds  -CA      Additional Comments 3-4x/day, HEP level 2- post op day 7 to day 14  -CA         Exercise 6    Exercise Name 6 shoulder rolls back  -CA      Reps 6 x10  -CA      Time 6 5 seconds  -CA      Additional Comments 3-4x/day, HEP level 2- post op day 7 to day 14  -CA         Exercise 7    Exercise Name 7 horizontal abduction/adduction with elbows extended  -CA      Reps 7 x10  -CA      Time 7 5  -CA      Additional Comments 3-4x/day, HEP level  2- post op day 7 to day 14  -CA         Exercise 8    Exercise Name 8 wall walks for shoulder flexion  -CA      Reps 8 x10  -CA      Additional Comments 3-4x/day, HEP level 3- post op day 14 until motion is returned  -CA         Exercise 9    Exercise Name 9 IR with hands behind back  -CA      Reps 9 x10  -CA      Additional Comments 3-4x/day, HEP level 3- post op day 14 until motion is returned  -CA         Exercise 10    Exercise Name 10 Horizontal abduction with hands behind head  -CA      Reps 10 x10  -CA      Additional Comments 3-4x/day, HEP level 3- post op day 14 until motion is returned  -CA         Exercise 11    Exercise Name 11 PNF D1 shoulder flexion  -CA      Reps 11 x10  -CA      Additional Comments 3-4x/day, HEP level 3- post op day 14 until motion is returned  -CA         Exercise 12    Exercise Name 12 Trunk stretch with shoulder abduction  -CA      Reps 12 x10  -CA      Time 12 5 seconds  -CA      Additional Comments 3-4x/day, HEP level 3- post op day 14 until motion is returned  -CA                User Key  (r) = Recorded By, (t) = Taken By, (c) = Cosigned By      Initials Name Provider Type    Andra Lacey, PT Physical Therapist                     PT OP Goals       Row Name 04/03/24 1410          Long Term Goals    LTG Date to Achieve 04/04/24  -CA     LTG 1 Pt demonstrates awareness of post-operative movement restrictions and HEP to facilitate lymphatic regeneration and reduce the risk of seroma formation, axillary web syndrome, and lymphedema while ensuring shoulder joint mobility.  -CA     LTG 1 Progress Met  -CA     LTG 2 Pt demonstrates understanding of post-operative basic lymphedema precautions  -CA     LTG 2 Progress Met  -CA        Time Calculation    PT Goal Re-Cert Due Date 04/04/24  -CA               User Key  (r) = Recorded By, (t) = Taken By, (c) = Cosigned By      Initials Name Provider Type    Andra Lacey, PT Physical Therapist                     PT  Assessment/Plan       Row Name 04/03/24 1410          PT Assessment    Assessment Comments This patient presents to PT pre-operatively for planned BrCA surgery scheduled in a couple weeks. Baseline ROM, postural, and bioimpedance measurements were taken today to be compared to measurements retaken 3-4 weeks post surgery. At that time, any reduced movement, decline in function, or postural issues will be addressed with skilled care and new goals will be established.  Personal risk factors for lymphedema post-operatively for the L upper extremity and trunk quadrant were also assessed today and basic lymphedema precautions were discussed. A more detailed discussion regarding personal lymphedema risk factors can take place post-operatively once the number of lymph nodes removed and the plan for further medical care is known.  -CA     Please refer to paper survey for additional self-reported information Yes  -CA     Rehab Potential Good  -CA     Patient/caregiver participated in establishment of treatment plan and goals Yes  -CA     Patient would benefit from skilled therapy intervention Yes  -CA        PT Plan    PT Frequency One time visit  -CA     Planned CPT's? PT EVAL MOD COMPLELITY: 53874;PT BIS XTRACELL FLUID ANALYSIS: 83653  -CA     PT Plan Comments This patient may return to PT 3-4 weeks post operatively for re-evaluation measurements to be compared to measurements taken today, at her pre-operative evaluation. In addition, she can be examined for possible post-BrCA surgery sequelae such as axillary web syndrome, scar adhesions, edema, worsened posture, scapular winging, pain, and reduced ROM and function. At that time, a future plan and goals will be established and skilled care continued if indicated. Currently, she has been provided with information before BrCA surgery in order to facilitate recovery and reduce the risk of post-operative sequelae.  -CA               User Key  (r) = Recorded By, (t) = Taken By,  (c) = Cosigned By      Initials Name Provider Type    Andra Lacey PT Physical Therapist                     Outcome Measure Options: Quick DASH, Timed Up and Go (TUG)  Quick DASH  Open a tight or new jar.: Mild Difficulty  Do heavy household chores (e.g., wash walls, wash floors): No Difficulty  Carry a shopping bag or briefcase: No Difficulty  Wash your back: No Difficulty  Use a knife to cut food: No Difficulty  During the past week, to what extent has your arm, shoulder, or hand problem interfered with your normal social activites with family, friends, neighbors or groups?: Not at all  During the past week, were you limited in your work or other regular daily activities as a result of your arm, shoulder or hand problem?: Slightly Limited  Arm, Shoulder, or hand pain: None  Tingling (pins and needles) in your arm, shoulder, or hand: None  During the past week, how much difficulty have you had sleeping because of the pain in your arm, shoulder or hand?: No difficulty  Number of Questions Answered: 11  Quick DASH Score: 2.27  Timed Up and Go (TUG)  TUG Test 1: 9.15 seconds  Timed Up and Go Comments: No gait deviations noted. no balance issues noted.      Time Calculation:   Start Time: 1410  Stop Time: 1517  Time Calculation (min): 67 min  Untimed Charges  PT Eval/Re-eval Minutes: 55  Total Minutes  Untimed Charges Total Minutes: 55   Total Minutes: 55     PT Eval includes documentation time as well. PT was also evaluating pt between Dr. Lopez's medical appointment.       Therapy Charges for Today       Code Description Service Date Service Provider Modifiers Qty    65355504739 HC PT EVAL MOD COMPLEXITY 4 4/3/2024 Andra Paul, PT GP 1    31204267617 HC PT BIS XTRACELL FLUID ANALYSIS 4/3/2024 Andra Paul, PT  1            PT G-Codes  Outcome Measure Options: Quick DASH, Timed Up and Go (TUG)  Quick DASH Score: 2.27  TUG Test 1: 9.15 seconds            Andra Paul  PT  4/4/2024

## 2024-05-02 ENCOUNTER — HOSPITAL ENCOUNTER (OUTPATIENT)
Dept: ONCOLOGY | Facility: HOSPITAL | Age: 59
Discharge: HOME | End: 2024-05-02
Payer: COMMERCIAL

## 2024-05-02 ENCOUNTER — PRE-ADMISSION TESTING (OUTPATIENT)
Dept: PREADMISSION TESTING | Facility: HOSPITAL | Age: 59
End: 2024-05-02
Payer: COMMERCIAL

## 2024-05-02 VITALS
WEIGHT: 130 LBS | TEMPERATURE: 98.6 F | HEART RATE: 116 BPM | BODY MASS INDEX: 21.66 KG/M2 | DIASTOLIC BLOOD PRESSURE: 87 MMHG | SYSTOLIC BLOOD PRESSURE: 137 MMHG | RESPIRATION RATE: 16 BRPM | HEIGHT: 65 IN

## 2024-05-02 VITALS — WEIGHT: 130.29 LBS | HEIGHT: 65 IN | BODY MASS INDEX: 21.71 KG/M2

## 2024-05-02 DIAGNOSIS — Z17.1 MALIGNANT NEOPLASM OF OVERLAPPING SITES OF LEFT BREAST IN FEMALE, ESTROGEN RECEPTOR NEGATIVE: Primary | ICD-10-CM

## 2024-05-02 DIAGNOSIS — C50.812 MALIGNANT NEOPLASM OF OVERLAPPING SITES OF LEFT BREAST IN FEMALE, ESTROGEN RECEPTOR NEGATIVE: Primary | ICD-10-CM

## 2024-05-02 LAB
ALBUMIN SERPL-MCNC: 4.5 G/DL (ref 3.5–5.2)
ALBUMIN/GLOB SERPL: 1.6 G/DL
ALP SERPL-CCNC: 70 U/L (ref 39–117)
ALT SERPL W P-5'-P-CCNC: 20 U/L (ref 1–33)
ANION GAP SERPL CALCULATED.3IONS-SCNC: 12 MMOL/L (ref 5–15)
AST SERPL-CCNC: 27 U/L (ref 1–32)
BILIRUB SERPL-MCNC: 0.3 MG/DL (ref 0–1.2)
BUN SERPL-MCNC: 14 MG/DL (ref 6–20)
BUN/CREAT SERPL: 19.7 (ref 7–25)
CALCIUM SPEC-SCNC: 9.6 MG/DL (ref 8.6–10.5)
CHLORIDE SERPL-SCNC: 101 MMOL/L (ref 98–107)
CO2 SERPL-SCNC: 24 MMOL/L (ref 22–29)
CREAT SERPL-MCNC: 0.71 MG/DL (ref 0.57–1)
DEPRECATED RDW RBC AUTO: 48.2 FL (ref 37–54)
EGFRCR SERPLBLD CKD-EPI 2021: 99.9 ML/MIN/1.73
ERYTHROCYTE [DISTWIDTH] IN BLOOD BY AUTOMATED COUNT: 12.3 % (ref 12.3–15.4)
GLOBULIN UR ELPH-MCNC: 2.8 GM/DL
GLUCOSE SERPL-MCNC: 99 MG/DL (ref 65–99)
HCT VFR BLD AUTO: 42.2 % (ref 34–46.6)
HGB BLD-MCNC: 13.7 G/DL (ref 12–15.9)
MCH RBC QN AUTO: 33.9 PG (ref 26.6–33)
MCHC RBC AUTO-ENTMCNC: 32.5 G/DL (ref 31.5–35.7)
MCV RBC AUTO: 104.5 FL (ref 79–97)
PLATELET # BLD AUTO: 156 10*3/MM3 (ref 140–450)
PMV BLD AUTO: 9.5 FL (ref 6–12)
POTASSIUM SERPL-SCNC: 3.9 MMOL/L (ref 3.5–5.2)
PROT SERPL-MCNC: 7.3 G/DL (ref 6–8.5)
RBC # BLD AUTO: 4.04 10*6/MM3 (ref 3.77–5.28)
SODIUM SERPL-SCNC: 137 MMOL/L (ref 136–145)
WBC NRBC COR # BLD AUTO: 5.17 10*3/MM3 (ref 3.4–10.8)

## 2024-05-02 PROCEDURE — 25010000002 HEPARIN LOCK FLUSH PER 10 UNITS: Performed by: INTERNAL MEDICINE

## 2024-05-02 PROCEDURE — 96523 IRRIG DRUG DELIVERY DEVICE: CPT

## 2024-05-02 PROCEDURE — 36415 COLL VENOUS BLD VENIPUNCTURE: CPT

## 2024-05-02 PROCEDURE — G0463 HOSPITAL OUTPT CLINIC VISIT: HCPCS

## 2024-05-02 PROCEDURE — 85027 COMPLETE CBC AUTOMATED: CPT

## 2024-05-02 PROCEDURE — 80053 COMPREHEN METABOLIC PANEL: CPT

## 2024-05-02 RX ORDER — HEPARIN SODIUM (PORCINE) LOCK FLUSH IV SOLN 100 UNIT/ML 100 UNIT/ML
500 SOLUTION INTRAVENOUS AS NEEDED
OUTPATIENT
Start: 2024-05-02

## 2024-05-02 RX ORDER — HEPARIN SODIUM (PORCINE) LOCK FLUSH IV SOLN 100 UNIT/ML 100 UNIT/ML
500 SOLUTION INTRAVENOUS AS NEEDED
Status: DISCONTINUED | OUTPATIENT
Start: 2024-05-02 | End: 2024-05-03 | Stop reason: HOSPADM

## 2024-05-02 RX ORDER — SODIUM CHLORIDE 0.9 % (FLUSH) 0.9 %
10 SYRINGE (ML) INJECTION AS NEEDED
OUTPATIENT
Start: 2024-05-02

## 2024-05-02 RX ADMIN — HEPARIN 500 UNITS: 100 SYRINGE at 14:45

## 2024-05-02 NOTE — PAT
Patient viewed general PAT education video as instructed in their preoperative information received from their surgeon.  Patient stated the general PAT education video was viewed in its entirety and survey completed.  Copies of [facility] general education handouts (Incentive Spirometry, Meds to Beds Program, Patient Belongings, Pre-op skin preparation instructions, Blood Glucose testing, Visitor policy, Surgery FAQ, Code H) distributed to patient if not printed. Education related to the PAT pass and skin preparation for surgery (if applicable) completed in PAT as a reinforcement to PAT education video. Patient instructed to return PAT pass provided today as well as completed skin preparation sheet (if applicable) on the day of procedure.     Additionally if patient had not viewed video yet but intended to view it at home or in our waiting area, then referred them to the handout with QR code/link provided during PAT visit.  Instructed patient to complete survey after viewing the video in its entirety.  Encouraged patient/family to read [facility] general education handouts thoroughly and notify PAT staff with any questions or concerns. Patient verbalized understanding of all information and priority content.    Patient to apply Chlorhexadine wipes  to surgical area (as instructed) the night before procedure and the AM of procedure. Wipes provided.    Patient instructed to drink 20 ounces of Gatorade or Gatorlyte (if diabetic) and it needs to be completed 1 hour (for Main OR patients) or 2 hours (scheduled  section & [facility] patients) before given arrival time for procedure (NO RED Gatorade and NO Gatorade Zero).    Patient verbalized understanding.

## 2024-05-13 ENCOUNTER — ANESTHESIA EVENT (OUTPATIENT)
Dept: PERIOP | Facility: HOSPITAL | Age: 59
End: 2024-05-13
Payer: COMMERCIAL

## 2024-05-13 RX ORDER — FAMOTIDINE 10 MG/ML
20 INJECTION, SOLUTION INTRAVENOUS ONCE
Status: CANCELLED | OUTPATIENT
Start: 2024-05-13 | End: 2024-05-13

## 2024-05-14 ENCOUNTER — HOSPITAL ENCOUNTER (OUTPATIENT)
Dept: NUCLEAR MEDICINE | Facility: HOSPITAL | Age: 59
Discharge: HOME | End: 2024-05-14
Payer: COMMERCIAL

## 2024-05-14 ENCOUNTER — ANESTHESIA (OUTPATIENT)
Dept: PERIOP | Facility: HOSPITAL | Age: 59
End: 2024-05-14
Payer: COMMERCIAL

## 2024-05-14 ENCOUNTER — ANESTHESIA EVENT CONVERTED (OUTPATIENT)
Dept: ANESTHESIOLOGY | Facility: HOSPITAL | Age: 59
End: 2024-05-14
Payer: COMMERCIAL

## 2024-05-14 ENCOUNTER — HOSPITAL ENCOUNTER (OUTPATIENT)
Facility: HOSPITAL | Age: 59
Discharge: HOME | End: 2024-05-15
Attending: SURGERY | Admitting: SURGERY
Payer: COMMERCIAL

## 2024-05-14 DIAGNOSIS — C50.512 MALIGNANT NEOPLASM OF LOWER-OUTER QUADRANT OF LEFT FEMALE BREAST, UNSPECIFIED ESTROGEN RECEPTOR STATUS: ICD-10-CM

## 2024-05-14 DIAGNOSIS — C50.412 MALIGNANT NEOPLASM OF UPPER-OUTER QUADRANT OF LEFT FEMALE BREAST: Primary | ICD-10-CM

## 2024-05-14 PROCEDURE — 0 TECHNETIUM FILTERED SULFUR COLLOID: Performed by: SURGERY

## 2024-05-14 PROCEDURE — 25010000002 FENTANYL CITRATE (PF) 100 MCG/2ML SOLUTION: Performed by: NURSE ANESTHETIST, CERTIFIED REGISTERED

## 2024-05-14 PROCEDURE — 25010000002 SUGAMMADEX 200 MG/2ML SOLUTION: Performed by: NURSE ANESTHETIST, CERTIFIED REGISTERED

## 2024-05-14 PROCEDURE — 25010000002 PROPOFOL 10 MG/ML EMULSION: Performed by: NURSE ANESTHETIST, CERTIFIED REGISTERED

## 2024-05-14 PROCEDURE — 25010000002 ONDANSETRON PER 1 MG: Performed by: NURSE ANESTHETIST, CERTIFIED REGISTERED

## 2024-05-14 PROCEDURE — S0260 H&P FOR SURGERY: HCPCS | Performed by: PHYSICIAN ASSISTANT

## 2024-05-14 PROCEDURE — 88331 PATH CONSLTJ SURG 1 BLK 1SPC: CPT | Performed by: PATHOLOGY

## 2024-05-14 PROCEDURE — A9541 TC99M SULFUR COLLOID: HCPCS | Performed by: SURGERY

## 2024-05-14 PROCEDURE — 25010000002 DEXAMETHASONE SODIUM PHOSPHATE 10 MG/ML SOLUTION: Performed by: NURSE ANESTHETIST, CERTIFIED REGISTERED

## 2024-05-14 PROCEDURE — 25010000002 BUPIVACAINE (PF) 0.25 % SOLUTION: Performed by: NURSE ANESTHETIST, CERTIFIED REGISTERED

## 2024-05-14 PROCEDURE — 25010000002 CEFAZOLIN PER 500 MG: Performed by: SURGERY

## 2024-05-14 PROCEDURE — 25810000003 SODIUM CHLORIDE 0.9 % SOLUTION: Performed by: SURGERY

## 2024-05-14 PROCEDURE — 38792 RA TRACER ID OF SENTINL NODE: CPT

## 2024-05-14 PROCEDURE — 25810000003 LACTATED RINGERS PER 1000 ML: Performed by: ANESTHESIOLOGY

## 2024-05-14 PROCEDURE — 88307 TISSUE EXAM BY PATHOLOGIST: CPT | Performed by: SURGERY

## 2024-05-14 RX ORDER — ACETAMINOPHEN 500 MG
1000 TABLET ORAL ONCE
Status: COMPLETED | OUTPATIENT
Start: 2024-05-14 | End: 2024-05-14

## 2024-05-14 RX ORDER — LIDOCAINE HYDROCHLORIDE 10 MG/ML
INJECTION, SOLUTION EPIDURAL; INFILTRATION; INTRACAUDAL; PERINEURAL AS NEEDED
Status: DISCONTINUED | OUTPATIENT
Start: 2024-05-14 | End: 2024-05-14 | Stop reason: SURG

## 2024-05-14 RX ORDER — SODIUM CHLORIDE, SODIUM LACTATE, POTASSIUM CHLORIDE, CALCIUM CHLORIDE 600; 310; 30; 20 MG/100ML; MG/100ML; MG/100ML; MG/100ML
9 INJECTION, SOLUTION INTRAVENOUS CONTINUOUS
Status: DISCONTINUED | OUTPATIENT
Start: 2024-05-14 | End: 2024-05-15 | Stop reason: HOSPADM

## 2024-05-14 RX ORDER — DROPERIDOL 2.5 MG/ML
0.62 INJECTION, SOLUTION INTRAMUSCULAR; INTRAVENOUS ONCE AS NEEDED
Status: DISCONTINUED | OUTPATIENT
Start: 2024-05-14 | End: 2024-05-14 | Stop reason: HOSPADM

## 2024-05-14 RX ORDER — CETIRIZINE HYDROCHLORIDE 10 MG/1
10 TABLET ORAL DAILY
Status: DISCONTINUED | OUTPATIENT
Start: 2024-05-14 | End: 2024-05-15 | Stop reason: HOSPADM

## 2024-05-14 RX ORDER — PREGABALIN 75 MG/1
75 CAPSULE ORAL ONCE
Status: COMPLETED | OUTPATIENT
Start: 2024-05-14 | End: 2024-05-14

## 2024-05-14 RX ORDER — SODIUM CHLORIDE 0.9 % (FLUSH) 0.9 %
10 SYRINGE (ML) INJECTION AS NEEDED
Status: DISCONTINUED | OUTPATIENT
Start: 2024-05-14 | End: 2024-05-14 | Stop reason: HOSPADM

## 2024-05-14 RX ORDER — ACETAMINOPHEN 500 MG
1000 TABLET ORAL EVERY 6 HOURS
Status: DISCONTINUED | OUTPATIENT
Start: 2024-05-14 | End: 2024-05-15 | Stop reason: HOSPADM

## 2024-05-14 RX ORDER — DIPHENHYDRAMINE HYDROCHLORIDE 50 MG/ML
12.5 INJECTION INTRAMUSCULAR; INTRAVENOUS EVERY 6 HOURS PRN
Status: DISCONTINUED | OUTPATIENT
Start: 2024-05-14 | End: 2024-05-15 | Stop reason: HOSPADM

## 2024-05-14 RX ORDER — PROMETHAZINE HYDROCHLORIDE 12.5 MG/1
6.25 TABLET ORAL EVERY 6 HOURS PRN
Status: DISCONTINUED | OUTPATIENT
Start: 2024-05-14 | End: 2024-05-15 | Stop reason: HOSPADM

## 2024-05-14 RX ORDER — HYDROMORPHONE HYDROCHLORIDE 1 MG/ML
0.5 INJECTION, SOLUTION INTRAMUSCULAR; INTRAVENOUS; SUBCUTANEOUS
Status: DISCONTINUED | OUTPATIENT
Start: 2024-05-14 | End: 2024-05-14 | Stop reason: HOSPADM

## 2024-05-14 RX ORDER — PROPOFOL 10 MG/ML
VIAL (ML) INTRAVENOUS AS NEEDED
Status: DISCONTINUED | OUTPATIENT
Start: 2024-05-14 | End: 2024-05-14 | Stop reason: SURG

## 2024-05-14 RX ORDER — SCOLOPAMINE TRANSDERMAL SYSTEM 1 MG/1
1 PATCH, EXTENDED RELEASE TRANSDERMAL
Status: DISCONTINUED | OUTPATIENT
Start: 2024-05-14 | End: 2024-05-14 | Stop reason: HOSPADM

## 2024-05-14 RX ORDER — DIAZEPAM 2 MG/1
2 TABLET ORAL EVERY 8 HOURS PRN
Status: DISCONTINUED | OUTPATIENT
Start: 2024-05-14 | End: 2024-05-15 | Stop reason: HOSPADM

## 2024-05-14 RX ORDER — MELOXICAM 15 MG/1
15 TABLET ORAL DAILY
Status: DISCONTINUED | OUTPATIENT
Start: 2024-05-15 | End: 2024-05-15 | Stop reason: HOSPADM

## 2024-05-14 RX ORDER — DEXAMETHASONE SODIUM PHOSPHATE 10 MG/ML
INJECTION, SOLUTION INTRAMUSCULAR; INTRAVENOUS AS NEEDED
Status: DISCONTINUED | OUTPATIENT
Start: 2024-05-14 | End: 2024-05-14 | Stop reason: SURG

## 2024-05-14 RX ORDER — HEPARIN SODIUM 5000 [USP'U]/ML
5000 INJECTION, SOLUTION INTRAVENOUS; SUBCUTANEOUS EVERY 8 HOURS SCHEDULED
Status: DISCONTINUED | OUTPATIENT
Start: 2024-05-15 | End: 2024-05-14

## 2024-05-14 RX ORDER — OXYCODONE HYDROCHLORIDE 5 MG/1
5 TABLET ORAL EVERY 4 HOURS PRN
Status: DISCONTINUED | OUTPATIENT
Start: 2024-05-14 | End: 2024-05-15 | Stop reason: HOSPADM

## 2024-05-14 RX ORDER — SODIUM CHLORIDE 0.9 % (FLUSH) 0.9 %
10 SYRINGE (ML) INJECTION EVERY 12 HOURS SCHEDULED
Status: DISCONTINUED | OUTPATIENT
Start: 2024-05-14 | End: 2024-05-14 | Stop reason: HOSPADM

## 2024-05-14 RX ORDER — FENTANYL CITRATE 50 UG/ML
INJECTION, SOLUTION INTRAMUSCULAR; INTRAVENOUS AS NEEDED
Status: DISCONTINUED | OUTPATIENT
Start: 2024-05-14 | End: 2024-05-14 | Stop reason: SURG

## 2024-05-14 RX ORDER — SERTRALINE HYDROCHLORIDE 25 MG/1
25 TABLET, FILM COATED ORAL DAILY
Status: DISCONTINUED | OUTPATIENT
Start: 2024-05-15 | End: 2024-05-15 | Stop reason: HOSPADM

## 2024-05-14 RX ORDER — NALOXONE HCL 0.4 MG/ML
0.1 VIAL (ML) INJECTION
Status: DISCONTINUED | OUTPATIENT
Start: 2024-05-14 | End: 2024-05-15 | Stop reason: HOSPADM

## 2024-05-14 RX ORDER — MIDAZOLAM HYDROCHLORIDE 1 MG/ML
1 INJECTION INTRAMUSCULAR; INTRAVENOUS
Status: DISCONTINUED | OUTPATIENT
Start: 2024-05-14 | End: 2024-05-14 | Stop reason: HOSPADM

## 2024-05-14 RX ORDER — ONDANSETRON 2 MG/ML
4 INJECTION INTRAMUSCULAR; INTRAVENOUS EVERY 6 HOURS PRN
Status: DISCONTINUED | OUTPATIENT
Start: 2024-05-14 | End: 2024-05-15 | Stop reason: HOSPADM

## 2024-05-14 RX ORDER — ROCURONIUM BROMIDE 10 MG/ML
INJECTION, SOLUTION INTRAVENOUS AS NEEDED
Status: DISCONTINUED | OUTPATIENT
Start: 2024-05-14 | End: 2024-05-14 | Stop reason: SURG

## 2024-05-14 RX ORDER — PROMETHAZINE HYDROCHLORIDE 12.5 MG/1
6.25 SUPPOSITORY RECTAL EVERY 6 HOURS PRN
Status: DISCONTINUED | OUTPATIENT
Start: 2024-05-14 | End: 2024-05-15 | Stop reason: HOSPADM

## 2024-05-14 RX ORDER — DEXAMETHASONE SODIUM PHOSPHATE 10 MG/ML
INJECTION, SOLUTION INTRAMUSCULAR; INTRAVENOUS
Status: COMPLETED | OUTPATIENT
Start: 2024-05-14 | End: 2024-05-14

## 2024-05-14 RX ORDER — ROSUVASTATIN CALCIUM 10 MG/1
5 TABLET, COATED ORAL DAILY
Status: DISCONTINUED | OUTPATIENT
Start: 2024-05-14 | End: 2024-05-15 | Stop reason: HOSPADM

## 2024-05-14 RX ORDER — SODIUM CHLORIDE 9 MG/ML
50 INJECTION, SOLUTION INTRAVENOUS CONTINUOUS
Status: DISCONTINUED | OUTPATIENT
Start: 2024-05-14 | End: 2024-05-15

## 2024-05-14 RX ORDER — LIDOCAINE HYDROCHLORIDE 10 MG/ML
0.5 INJECTION, SOLUTION EPIDURAL; INFILTRATION; INTRACAUDAL; PERINEURAL ONCE AS NEEDED
Status: COMPLETED | OUTPATIENT
Start: 2024-05-14 | End: 2024-05-14

## 2024-05-14 RX ORDER — LORAZEPAM 0.5 MG/1
0.5 TABLET ORAL EVERY 8 HOURS PRN
Status: DISCONTINUED | OUTPATIENT
Start: 2024-05-14 | End: 2024-05-15 | Stop reason: HOSPADM

## 2024-05-14 RX ORDER — BUPIVACAINE HYDROCHLORIDE 2.5 MG/ML
INJECTION, SOLUTION EPIDURAL; INFILTRATION; INTRACAUDAL
Status: COMPLETED | OUTPATIENT
Start: 2024-05-14 | End: 2024-05-14

## 2024-05-14 RX ORDER — FAMOTIDINE 20 MG/1
20 TABLET, FILM COATED ORAL ONCE
Status: COMPLETED | OUTPATIENT
Start: 2024-05-14 | End: 2024-05-14

## 2024-05-14 RX ORDER — FENTANYL CITRATE 50 UG/ML
50 INJECTION, SOLUTION INTRAMUSCULAR; INTRAVENOUS
Status: DISCONTINUED | OUTPATIENT
Start: 2024-05-14 | End: 2024-05-14 | Stop reason: HOSPADM

## 2024-05-14 RX ORDER — SODIUM CHLORIDE 9 MG/ML
40 INJECTION, SOLUTION INTRAVENOUS AS NEEDED
Status: DISCONTINUED | OUTPATIENT
Start: 2024-05-14 | End: 2024-05-14 | Stop reason: HOSPADM

## 2024-05-14 RX ORDER — MELOXICAM 15 MG/1
15 TABLET ORAL ONCE
Status: COMPLETED | OUTPATIENT
Start: 2024-05-14 | End: 2024-05-14

## 2024-05-14 RX ORDER — ONDANSETRON 2 MG/ML
INJECTION INTRAMUSCULAR; INTRAVENOUS AS NEEDED
Status: DISCONTINUED | OUTPATIENT
Start: 2024-05-14 | End: 2024-05-14 | Stop reason: SURG

## 2024-05-14 RX ADMIN — FAMOTIDINE 20 MG: 20 TABLET, FILM COATED ORAL at 07:05

## 2024-05-14 RX ADMIN — SODIUM CHLORIDE, POTASSIUM CHLORIDE, SODIUM LACTATE AND CALCIUM CHLORIDE 9 ML/HR: 600; 310; 30; 20 INJECTION, SOLUTION INTRAVENOUS at 07:05

## 2024-05-14 RX ADMIN — SCOPOLAMINE 1 PATCH: 1.5 PATCH, EXTENDED RELEASE TRANSDERMAL at 07:04

## 2024-05-14 RX ADMIN — ACETAMINOPHEN 1000 MG: 500 TABLET ORAL at 20:07

## 2024-05-14 RX ADMIN — SODIUM CHLORIDE 50 ML/HR: 9 INJECTION, SOLUTION INTRAVENOUS at 12:44

## 2024-05-14 RX ADMIN — SUGAMMADEX 200 MG: 100 INJECTION, SOLUTION INTRAVENOUS at 09:16

## 2024-05-14 RX ADMIN — MELOXICAM 15 MG: 15 TABLET ORAL at 07:05

## 2024-05-14 RX ADMIN — PROPOFOL 150 MG: 10 INJECTION, EMULSION INTRAVENOUS at 07:27

## 2024-05-14 RX ADMIN — ACETAMINOPHEN 1000 MG: 500 TABLET ORAL at 12:44

## 2024-05-14 RX ADMIN — PROPOFOL 25 MCG/KG/MIN: 10 INJECTION, EMULSION INTRAVENOUS at 07:35

## 2024-05-14 RX ADMIN — LIDOCAINE HYDROCHLORIDE 0.5 ML: 10 INJECTION, SOLUTION EPIDURAL; INFILTRATION; INTRACAUDAL; PERINEURAL at 07:05

## 2024-05-14 RX ADMIN — PREGABALIN 75 MG: 75 CAPSULE ORAL at 07:05

## 2024-05-14 RX ADMIN — ROSUVASTATIN 5 MG: 10 TABLET, FILM COATED ORAL at 12:44

## 2024-05-14 RX ADMIN — DEXAMETHASONE SODIUM PHOSPHATE 6 MG: 10 INJECTION, SOLUTION INTRAMUSCULAR; INTRAVENOUS at 07:30

## 2024-05-14 RX ADMIN — ROCURONIUM BROMIDE 40 MG: 10 INJECTION INTRAVENOUS at 07:27

## 2024-05-14 RX ADMIN — LIDOCAINE HYDROCHLORIDE 50 MG: 10 INJECTION, SOLUTION EPIDURAL; INFILTRATION; INTRACAUDAL; PERINEURAL at 07:27

## 2024-05-14 RX ADMIN — ACETAMINOPHEN 1000 MG: 500 TABLET ORAL at 07:05

## 2024-05-14 RX ADMIN — TECHNETIUM TC 99M SULFUR COLLOID 1 DOSE: KIT at 07:30

## 2024-05-14 RX ADMIN — DEXAMETHASONE SODIUM PHOSPHATE 4 MG: 10 INJECTION, SOLUTION INTRAMUSCULAR; INTRAVENOUS at 07:29

## 2024-05-14 RX ADMIN — CETIRIZINE HYDROCHLORIDE 10 MG: 10 TABLET, FILM COATED ORAL at 12:44

## 2024-05-14 RX ADMIN — SODIUM CHLORIDE 2000 MG: 900 INJECTION INTRAVENOUS at 07:32

## 2024-05-14 RX ADMIN — ONDANSETRON 4 MG: 2 INJECTION INTRAMUSCULAR; INTRAVENOUS at 09:12

## 2024-05-14 RX ADMIN — BUPIVACAINE HYDROCHLORIDE 60 ML: 2.5 INJECTION, SOLUTION EPIDURAL; INFILTRATION; INTRACAUDAL; PERINEURAL at 07:29

## 2024-05-14 RX ADMIN — FENTANYL CITRATE 100 MCG: 50 INJECTION, SOLUTION INTRAMUSCULAR; INTRAVENOUS at 07:27

## 2024-05-14 NOTE — BRIEF OP NOTE
BREAST MASTECTOMY WITH SENTINEL NODE BIOPSY BILATERAL  Progress Note    Swathi Cain  5/14/2024    Pre-op Diagnosis:   Left breast cancer in the upper outer quadrant       Post-Op Diagnosis Codes:     * Malignant neoplasm of upper-outer quadrant of left female breast [C50.412]    Procedure/CPT® Codes:        Procedure(s):  LEFT BREAST MASTECTOMY WITH LEFT SENTINEL NODE BIOPSY, RIGHT PROPHYLACTIC MASTECTOMY              Surgeon(s):  Kiara Lopez MD    Anesthesia: General    Staff:   Circulator: Gabriela Tyson RN; Mayela Cisneros RN  Scrub Person: Gabriela Donohue  Nursing Assistant: Sapphire Jenkins PCT  Assistant: Tish Dunne PA  Assistant: Tish Dunne PA      Estimated Blood Loss: minimal    Urine Voided: * No values recorded between 5/14/2024  7:22 AM and 5/14/2024  9:30 AM *    Specimens:                Specimens       ID Source Type Tests Collected By Collected At Frozen?    A Breast, Right Tissue TISSUE PATHOLOGY EXAM   Kiara Lopez MD 5/14/24 0820 No    Description: stitch is lateral    B [place] Lymph Node Tissue TISSUE PATHOLOGY EXAM   Kiara Lopez MD 5/14/24 0842 Yes    Description: left sentinel node    C Breast, Left Tissue TISSUE PATHOLOGY EXAM   Kiara Lopez MD 5/14/24 0858 No    Description: stitch is lateral                  Drains:   Closed/Suction Drain 1 Right Breast Bulb 15 Fr. (Active)       Closed/Suction Drain 1 Left Breast Bulb 15 Fr. (Active)       Findings: Left sentinel lymph node negative for macrometastases        Complications: None    Assistant: Tish Dunne PA  was responsible for performing the following activities: Retraction, Suction, Closing, and Placing Dressing and their skilled assistance was necessary for the success of this case.    Kiara Lopez MD     Date: 5/14/2024  Time: 09:35 EDT

## 2024-05-14 NOTE — PLAN OF CARE
Goal Outcome Evaluation:Pain controlled without medications stronger than tylenol.  CODY's with minimal serosanguinous drainage.  Able to eat regular diet without nausea. Out of bed with touch assist.

## 2024-05-14 NOTE — ANESTHESIA POSTPROCEDURE EVALUATION
Patient: Swathi Cain    Procedure Summary       Date: 05/14/24 Room / Location:  [facility]    Anesthesia Start: 0722 Anesthesia Stop: 0944    Procedure: LEFT BREAST MASTECTOMY WITH LEFT SENTINEL NODE BIOPSY, [facility] PROPHYLACTIC MASTECTOMY (Bilateral: Breast) Diagnosis: Malignant neoplasm of upper-outer quadrant of left female breast    Surgeons: Kiara Lopez MD Provider: Kevin Lozano MD    Anesthesia Type: general with block ASA Status: 2            Anesthesia Type: general with block    Vitals  Vitals Value Taken Time   /60 05/14/24 0940   Temp     Pulse 78 05/14/24 0943   Resp     SpO2 100 % 05/14/24 0943   Vitals shown include unfiled device data.    Temp: 97.6 F  RR: 18      Post Anesthesia Care and Evaluation    Patient location during evaluation: PACU  Patient participation: waiting for patient participation  Level of consciousness: sleepy but conscious  Pain management: adequate    Airway patency: patent  Anesthetic complications: No anesthetic complications  PONV Status: none  Cardiovascular status: hemodynamically stable and acceptable  Respiratory status: nonlabored ventilation, acceptable and nasal cannula  Hydration status: acceptable

## 2024-05-14 NOTE — OP NOTE
Operative Note    Swathi Cain  [id number]   1965     Date of Surgery:  5/14/2024    Pre-Operative Diagnosis: Left breast cancer in the upper outer quadrant    Post-Operative Diagnosis: Left breast cancer in the upper outer quadrant    Procedure: Left sentinel lymph node injection                      Left mastectomy                      Left deep subfascial sentinel lymph node biopsy                      Prophylactic right mastectomy    Anesthesia:  General     [place] Node Biopsy for Breast Cancer - Left  Operation performed with curative intent. Yes   Tracer(s) used to identify sentinel nodes in the upfront surgery (non-neoadjuvant) setting (select all that apply). Radioactive tracer   Tracer(s) used to identify sentinel nodes in the neoadjuvant setting (select all that apply). N/A   All nodes (colored or non-colored) present at the end of a dye-filled lymphatic channel were removed. N/A   All significantly radioactive nodes were removed. Yes   All palpably suspicious nodes were removed. N/A   Biopsy-proven positive nodes marked with clips prior to chemotherapy were identified and removed. N/A             Surgeon:  Kiara Lopez MD    Circulator: Gabriela Tyson RN; Mayela Cisneros RN  Scrub Person: Gabriela Donohue  Nursing Assistant: Sapphire Jenkins PCT  Assistant: Tish Dunne PA     Assistant: Tish Dunne PA    Estimated Blood Loss: Very minimal    Findings: Left sentinel lymph node negative for macrometastases    Complications: None      Indication for Procedure: Mrs. Cain is a pleasant 58-year-old lady who presented with a left breast malignancy in the upper outer quadrant that is [place], [place] and HER2 negative.  She received neoadjuvant chemotherapy with good clinical response.  She presents today for the above procedure with no plan for reconstruction.    Procedure: Patient was taken to the operating room by anesthesia and placed supine on the table.  Following induction of general  endotracheal anesthesia, SCDs were placed.  She received 2 g of Kefzol IV.  Anesthesia placed ultrasound-guided pectoralis nerve blocks bilaterally.  550 mCi of radioactive technetium was injected in the left subareolar region.  The breasts, chest, axillas and upper arms were then prepped and draped in a sterile fashion.  Timeout was observed.  We proceeded with the prophylactic right mastectomy, which was done via a transverse open incision, encompassing the areola and nipple complex.  A superior then inferior flaps were raised dissecting the breast tissue away from the flaps down to where the muscle was exposed, maintaining adequate thickness of the flaps.  The breast was then removed off the underlying pectoralis major muscle taking the fascia along with the specimen.  Larger vessels of the breast were ligated with 3-0 silk suture.  The breast was removed as a whole, marked with a silk suture laterally, weighed and sent to pathology for permanent section.  The wound was then irrigated with warm water with clear return of fluid noted.  We then proceeded with the left mastectomy which was done in similar fashion.  Once in the axilla we identified a deep subfascial sentinel lymph node that had a very high radioactive count.  This was excised and sent to pathology for frozen section and noted to be negative for macrometastases.  No other significant radioactivity or palpable nodes were appreciated in the axilla.  The breast was also removed as a whole, marked with a silk suture laterally, weighed and sent to pathology for permanent section.  The wound was irrigated with water with clear return of fluid noted.  15 Tamazight round Carroll-Stovall drains were then placed percutaneously on either side and anchored to the skin with 2-0 silk suture.  The subcutaneous tissues were approximated with interrupted 3-0 Vicryl sutures and the skin incisions were covered with Exofin fusion Premiere dressings.  ABDs were then Ace  bandage were then applied.  The patient tolerated the procedure well with no complications.  She was extubated and taken to the recovery room in stable condition.  Sponge count and needle count were correct at the end of the procedure.    Assistant: Tish Dunne PA  was responsible for performing the following activities: Retraction, Suction, Closing, and Placing Dressing and their skilled assistance was necessary for the success of this case.      Kiara Lopez MD  05/14/24  09:41 EDT

## 2024-05-14 NOTE — PROGRESS NOTES
"Swathi Cain  1965  [id number]    Surgery Post - Operative Note    Date of visit: 5/14/2024    Subjective: Very minimal  Ambulating  Voiding well    Objective:    /75   Pulse 95   Temp 97.7 °F (36.5 °C)   Resp 16   Ht 165.1 cm (65\")   Wt 59 kg (130 lb)   SpO2 98%   BMI 21.63 kg/m²     Intake/Output Summary (Last 24 hours) at 5/14/2024 1555  Last data filed at 5/14/2024 1045  Gross per 24 hour   Intake 900 ml   Output --   Net 900 ml         CV: Regular rate and rhythm  L: normal air entry  BREAST: Dressing is intact  Adequate Carroll-Stovall drainage    LABS:              Invalid input(s): \"LABALBU\", \"PROT\"      No results found for: \"LIPASE\"    Assessment/ Plan: Stable course status post bilateral mastectomies and left sentinel lymph node biopsy  Patient is progressing well  Continue with the current management    Problem List Items Addressed This Visit          Hematology and Neoplasia    * (Principal) Malignant neoplasm of upper-outer quadrant of left female breast    Relevant Orders    Tissue Pathology Exam (Completed)         Kiara Lopez MD  5/14/2024  15:55 EDT    "

## 2024-05-14 NOTE — ANESTHESIA PROCEDURE NOTES
Peripheral Block      Patient reassessed immediately prior to procedure    Patient location during procedure: OR  Reason for block: at surgeon's request and post-op pain management  Performed by  Anesthesiologist: Kevin Lozano MD  Preanesthetic Checklist  Completed: patient identified, IV checked, site marked, risks and benefits discussed, surgical consent, monitors and equipment checked, pre-op evaluation and timeout performed  Prep:  Pt Position: supine  Sterile barriers:cap, gloves, mask and washed/disinfected hands  Prep: ChloraPrep  Patient monitoring: blood pressure monitoring, continuous pulse oximetry and EKG  Procedure  Performed under: general  Guidance:ultrasound guided and landmark technique  Images:still images obtained, printed/placed on chart    Laterality:Bilateral  Block Type:PECS I and PECS II  Injection Technique:single-shot  Needle Type:short-bevel  Needle Gauge:20 G  Resistance on Injection: none    Medications Used: dexamethasone sodium phosphate injection - Injection   4 mg - 5/14/2024 7:29:00 AM  bupivacaine PF (MARCAINE) 0.25 % injection - Injection   60 mL - 5/14/2024 7:29:00 AM      Medications  Preservative Free Saline:10ml  Comment:Block Injection:  Total volume of LA divided between Right and Left sided blocks         Post Assessment  Injection Assessment: negative aspiration for heme, incremental injection and no paresthesia on injection  Patient Tolerance:comfortable throughout block  Complications:no  Additional Notes  Interpectoral-Pectoserratus Plane   A high-frequency linear transducer, with sterile cover, was placed medial to the coracoid process in the paramedian sagittal plane. The transducer was moved caudally to the 4th rib and rotated slightly to allow an in-plane needle trajectory from medial to lateral. Pectoralis Major Muscle (PMM), Pectoralis Minor Muscle (PmM), Thoracoacromial Artery, Ribs, and Pleura were identified under ultrasound. The insertion site was prepped  "in sterile fashion and then localized with 2-5 ml of 1% Lidocaine. Using ultrasound-guidance, a 20-gauge B-Sesay 4\" Ultraplex 360 non-stimulating echogenic needle was advanced in plane until the tip of the needle was in the fascial plane between the PMM and PmM, lateral to the Thoracoacromial Artery. 1-3ml of preservative free normal saline was used to hydro-dissect the fascial planes. After the fascial plane was verified, 10ml local anesthetic (LA) was injected for Interpectoral fascial plane block. The needle was continued along the same path to the level of the 4th rib below PmM.  Initially preservative free normal saline was used to confirm needle position and then 20 ml of LA was injected for Pectoserratus fascial plane block. Aspiration every 5 ml to prevent intravascular injection. Injection was completed with negative aspiration of blood and negative intravascular injection. Injection pressures were normal with minimal resistance.               "

## 2024-05-14 NOTE — ANESTHESIA PREPROCEDURE EVALUATION
Anesthesia Evaluation     Patient summary reviewed and Nursing notes reviewed   NPO Solid Status: > 8 hours  NPO Liquid Status: > 2 hours           Airway   Mallampati: II  TM distance: >3 FB  Neck ROM: full  No difficulty expected  Dental - normal exam     Pulmonary - negative pulmonary ROS and normal exam    breath sounds clear to auscultation  Cardiovascular - negative cardio ROS and normal exam    Rhythm: regular  Rate: normal      ROS comment: 11/23 Echo:  ·  Left ventricular systolic function is normal. Calculated left ventricular EF = 63.5%. Left ventricular ejection fraction appears to be 61 - 65%.  ·  Global longitudinal LV strain (GLS) = -20.2%.  ·  Trace to mild tricuspid valve regurgitation is present.  ·  Estimated right ventricular systolic pressure from tricuspid regurgitation is mildly elevated (35-45 mmHg). Calculated right ventricular systolic pressure from tricuspid regurgitation is 36 mmHg.         Neuro/Psych- negative ROS  GI/Hepatic/Renal/Endo - negative ROS     Musculoskeletal (-) negative ROS    Abdominal    Substance History      OB/GYN          Other      history of cancer (Breast ca)                  Anesthesia Plan    ASA 2     general with block     (Bilateral PECS I/II blocks post-induction for post-operative analgesia per request of Dr. SMITH  )  intravenous induction     Anesthetic plan, risks, benefits, and alternatives have been provided, discussed and informed consent has been obtained with: patient.    Plan discussed with CRNA.    CODE STATUS:

## 2024-05-14 NOTE — H&P
[facility] PREOPERATIVE HISTORY AND PHYSICAL       Chief complaint:  Breast Cancer    Subjective:    Patient is a 58 y.o.female presents with history of breast cancer, diagnosed with Stage I (T1 (12mm), N0, M0) invasive ductal carcinoma (grade 3) of left breast  (UOQ).  Tumor marker include estrogen receptor negative, progesterone receptor negative and HER-2/apollo negative.  See office note dated 4/03/2024. The patient is here today for scheduled and consented LEFT BREAST MASTECTOMY WITH LEFT SENTINEL NODE BIOPSY, RIGH PROPHYLACTIC MASTECTOMY.      Associated Documents:  HISTORY AND PHYSICAL - SCAN - SENTINEL LYMPH NODE BIOPSY, [facility], 04/03/2024 (05/14/2024)     Review of Systems:  General ROS: negative for fever, chills, weakness, dizziness, headache, fatigue, weight changes  Cardiovascular ROS: no chest pain or dyspnea on exertion  Respiratory ROS: no cough, shortness of breath, or wheezing  GI ROS: no abdominal pain/discomfort, nausea, vomiting or diarrhea   ROS: no dysuria, hematuria or complaints  Skin ROS: no itching, rash or open wounds.        Allergies: No Known Allergies  Latex: no known allergy  Contrast Dye:  no known allergy      Home Meds    Medications Prior to Admission   Medication Sig Dispense Refill Last Dose    cetirizine (zyrTEC) 10 MG tablet Take 1 tablet by mouth Daily.   5/13/2024    Magic Mouthwash Oral Suspension (diphenhydrAMINE HCl - aluminum & magnesium hydroxide-simethicone - lidocaine - nystatin) Swish and spit or swallow 5-10 mL 4 (Four) Times a Day As Needed. 240 mL 3 Past Month    multivitamin with minerals (Hair Skin and Nails Formula) tablet tablet Take 1 tablet by mouth Daily.   5/13/2024    rosuvastatin (CRESTOR) 5 MG tablet Take 1 tablet by mouth Daily.   5/13/2024    sertraline (ZOLOFT) 50 MG tablet Take 0.5 tablets by mouth Daily.   5/14/2024 at 0415    vitamin B-12 (CYANOCOBALAMIN) 500 MCG tablet Take 1 tablet by mouth Daily.   5/13/2024     "lidocaine-prilocaine (EMLA) 2.5-2.5 % cream Apply 1 application  topically to the appropriate area as directed As Needed (45-60 minutes prior to port access.  Cover with saran/plastic wrap.). 30 g 3     lidocaine-prilocaine (EMLA) 2.5-2.5 % cream Apply 1 application  topically to the appropriate area as directed As Needed (45-60 minutes prior to port access.  Cover with saran/plastic wrap.). 30 g 3     multivitamin with minerals tablet tablet Take 1 tablet by mouth Daily.       nystatin (MYCOSTATIN) 100,000 unit/mL suspension        ondansetron (Zofran) 8 MG tablet Take 1 tablet by mouth Every 8 (Eight) Hours As Needed for Nausea or Vomiting. 30 tablet 3      PMH: History reviewed. No pertinent past medical history.  PSH:    Past Surgical History:   Procedure Laterality Date    COLONOSCOPY      WISDOM TOOTH EXTRACTION       Immunization History:   Immunization History   Administered Date(s) Administered    COVID-19 (MODERNA) 1st,2nd,3rd Dose Monovalent 01/21/2021, 02/17/2021    COVID-19 (MODERNA) Monovalent Original Booster 01/04/2022   Pneumococcal= yes   Influenza= yes   Tetanus= unknown     Social History:  Social History     Tobacco Use    Smoking status: Never    Smokeless tobacco: Never   Substance Use Topics    Alcohol use: Not Currently           Physical Exam:/70 (BP Location: Left arm, Patient Position: Lying)   Pulse 108   Temp 98.5 °F (36.9 °C) (Temporal)   Resp 16   Ht 165.1 cm (65\")   Wt 59 kg (130 lb)   SpO2 100%   BMI 21.63 kg/m²       General Appearance:    Alert, cooperative, no distress, appears stated age   Head:    Normocephalic, without obvious abnormality, atraumatic   Lungs:     Clear to auscultation bilaterally, respirations unlabored    Heart: Regular rate and rhythm, S1 and S2 normal, no murmur, rub    or gallop    Abdomen:    Soft without tenderness  +bowel sounds   Breast Exam:    deferred   Genitalia:    deferred   Extremities:   Extremities normal, atraumatic, no cyanosis " or edema   Skin:   Skin color, texture, turgor normal, no rashes or lesions   Neurologic:   Grossly intact     Results Review:   LABS:  Lab Results   Component Value Date    WBC 5.17 05/02/2024    HGB 13.7 05/02/2024    HCT 42.2 05/02/2024    .5 (H) 05/02/2024     05/02/2024    NEUTROABS 1.94 03/21/2024    GLUCOSE 99 05/02/2024    BUN 14 05/02/2024    CREATININE 0.71 05/02/2024     05/02/2024    K 3.9 05/02/2024     05/02/2024    CO2 24.0 05/02/2024    CALCIUM 9.6 05/02/2024    ALBUMIN 4.5 05/02/2024    AST 27 05/02/2024    ALT 20 05/02/2024    BILITOT 0.3 05/02/2024       RADIOLOGY:  Imaging Results (Last 72 Hours)       ** No results found for the last 72 hours. **              Cancer Staging:  Cancer Patient: Stage I (T1 (12mm), N0, M0) invasive ductal carcinoma (grade 3) of left breast  (UOQ).  Tumor marker include estrogen receptor negative, progesterone receptor negative and HER-2/apollo negative.     Impression:  Invasive ductal carcinoma (grade 3) of left breast    Plan:  LEFT BREAST MASTECTOMY WITH LEFT SENTINEL NODE BIOPSY, RIGH PROPHYLACTIC MASTECTOMY       DANNIELLE Rosenthal 5/14/2024 07:05 EDT

## 2024-05-14 NOTE — ANESTHESIA PROCEDURE NOTES
Airway  Urgency: elective    Date/Time: 5/14/2024 7:29 AM  Airway not difficult    General Information and Staff    Patient location during procedure: OR  CRNA/CAA: Lindy Ellis CRNA    Indications and Patient Condition  Indications for airway management: airway protection    Preoxygenated: yes  MILS not maintained throughout  Mask difficulty assessment: 1 - vent by mask    Final Airway Details  Final airway type: endotracheal airway      Successful airway: ETT  Cuffed: yes   Successful intubation technique: direct laryngoscopy  Endotracheal tube insertion site: oral  Blade: Sandi  Blade size: 3  ETT size (mm): 7.0  Cormack-Lehane Classification: grade I - full view of glottis  Placement verified by: chest auscultation and capnometry   Measured from: lips  ETT/EBT  to lips (cm): 21  Number of attempts at approach: 1  Assessment: lips, teeth, and gum same as pre-op and atraumatic intubation    Additional Comments  Negative epigastric sounds, Breath sound equal bilaterally with symmetric chest rise and fall

## 2024-05-15 VITALS
DIASTOLIC BLOOD PRESSURE: 75 MMHG | HEIGHT: 65 IN | TEMPERATURE: 96.8 F | BODY MASS INDEX: 21.66 KG/M2 | OXYGEN SATURATION: 97 % | RESPIRATION RATE: 16 BRPM | WEIGHT: 130 LBS | SYSTOLIC BLOOD PRESSURE: 115 MMHG | HEART RATE: 75 BPM

## 2024-05-15 RX ORDER — OXYCODONE HYDROCHLORIDE 5 MG/1
5 TABLET ORAL EVERY 8 HOURS PRN
Qty: 7 TABLET | Refills: 0 | Status: SHIPPED | OUTPATIENT
Start: 2024-05-15 | End: 2024-05-18

## 2024-05-15 RX ADMIN — ROSUVASTATIN 5 MG: 10 TABLET, FILM COATED ORAL at 08:21

## 2024-05-15 RX ADMIN — SERTRALINE 25 MG: 25 TABLET, FILM COATED ORAL at 08:21

## 2024-05-15 RX ADMIN — CETIRIZINE HYDROCHLORIDE 10 MG: 10 TABLET, FILM COATED ORAL at 08:21

## 2024-05-15 RX ADMIN — ACETAMINOPHEN 1000 MG: 500 TABLET ORAL at 07:59

## 2024-05-15 RX ADMIN — MELOXICAM 15 MG: 15 TABLET ORAL at 08:21

## 2024-05-15 RX ADMIN — ACETAMINOPHEN 1000 MG: 500 TABLET ORAL at 01:45

## 2024-05-15 NOTE — PLAN OF CARE
Goal Outcome Evaluation:Being discharged to home for self care.  Sent with 2 additional elastic wraps, 2 tubs of 4X4's, drainage cups and medication cups.  Two additional drain pouches sent.

## 2024-05-15 NOTE — PLAN OF CARE
Goal Outcome Evaluation:  Plan of Care Reviewed With: patient        Progress: improving  Outcome Evaluation: pt rested well, pain controlled with scheduled tylenol, voiding without difficulty, ambulating, tolerating PO

## 2024-05-15 NOTE — CASE MANAGEMENT/SOCIAL WORK
Case Management Discharge Note      Final Note: No discharge needs. Plan is Home with family transporting.         Selected Continued Care - Admitted Since 5/14/2024       Destination    No services have been selected for the patient.                Durable Medical Equipment    No services have been selected for the patient.                Dialysis/Infusion    No services have been selected for the patient.                Home Medical Care    No services have been selected for the patient.                Therapy    No services have been selected for the patient.                Community Resources    No services have been selected for the patient.                Community & DME    No services have been selected for the patient.                         Final Discharge Disposition Code: 01 - home or self-care

## 2024-05-15 NOTE — PROGRESS NOTES
"Swathi Cain  1965  [id number]    Surgery Progress Note    Date of visit: 5/15/2024    Subjective: Comfortable with no complaints of pain  Ambulating    Objective:    /75 (BP Location: Left arm, Patient Position: Lying)   Pulse 75   Temp 96.8 °F (36 °C) (Temporal)   Resp 16   Ht 165.1 cm (65\")   Wt 59 kg (130 lb)   SpO2 97%   BMI 21.63 kg/m²     Intake/Output Summary (Last 24 hours) at 5/15/2024 0753  Last data filed at 5/15/2024 0330  Gross per 24 hour   Intake 1040 ml   Output 1095 ml   Net -55 ml       CV: Regular rate and rhythm  L: normal air entry  BREAST: Bilateral mastectomy incisions are intact  Flaps are viable with no swelling  Adequate Carroll-Stovall drainage    LABS:              Invalid input(s): \"LABALBU\", \"PROT\"      No results found for: \"LIPASE\"      Assessment/ Plan: Stable course status post bilateral mastectomies and left sentinel lymph node biopsy  Patient is doing well  Plan for discharge home today  Instructions were given to her and her   Tylenol 1000 mg p.o. 3 times daily for 4 days  Oxycodone as needed  Follow-up in the cancer clinic on 5/22/2024    Problem List Items Addressed This Visit          Hematology and Neoplasia    * (Principal) Malignant neoplasm of upper-outer quadrant of left female breast    Relevant Orders    Tissue Pathology Exam (Completed)         Kiara Lopez MD  5/15/2024  07:53 EDT    "

## 2024-05-17 LAB
CYTO UR: NORMAL
LAB AP CASE REPORT: NORMAL
LAB AP CLINICAL INFORMATION: NORMAL
LAB AP DIAGNOSIS COMMENT: NORMAL
Lab: NORMAL
PATH REPORT.FINAL DX SPEC: NORMAL
PATH REPORT.GROSS SPEC: NORMAL

## 2024-05-21 NOTE — PROGRESS NOTES
"      PROBLEM LIST:  [id number] triple negative (her2 0+) invasive ductal carcinoma of the left breast  Biopsy of a 1.2 cm left breast mass on 10/4/23.  Pathology showed a high grade IDC.  Neoadjuvant chemotherapy with ddAC followed by taxol started 11/9/23  Bilateral mastectomy on 5/14/24.  Pathology showed a grade 2 IDC measuring 1.1 cm.  0/2 SLN involved.  There was evidence of of response in the invasive carcinoma, and possibly in the lymph nodes.  [id number]    Subjective     CHIEF COMPLAINT: breast cancer    HISTORY OF PRESENT ILLNESS:   Swathi Cain returns for follow-up.   She had bilateral mastectomy last week.  She says she is feeling okay.            Objective      Pulse (!) 142   Temp 98.4 °F (36.9 °C)   Resp 16   Ht 165.1 cm (65\")   Wt 58.1 kg (128 lb)   SpO2 99%   BMI 21.30 kg/m²   Vitals:    05/22/24 1401   PainSc: 0-No pain                     ECOG score: 0             General: well appearing female in no acute distress  Neuro: alert and oriented  HEENT: sclera anicteric  Extremeties: no lower extremity edema  Skin: no rashes, lesions, bruising, or petechiae  Psych: mood and affect appropriate            RECENT LABS:  Lab Results   Component Value Date    WBC 5.17 05/02/2024    HGB 13.7 05/02/2024    HCT 42.2 05/02/2024    .5 (H) 05/02/2024     05/02/2024       Lab Results   Component Value Date    GLUCOSE 99 05/02/2024    BUN 14 05/02/2024    CREATININE 0.71 05/02/2024    BCR 19.7 05/02/2024    K 3.9 05/02/2024    CO2 24.0 05/02/2024    CALCIUM 9.6 05/02/2024    ALBUMIN 4.5 05/02/2024    AST 27 05/02/2024    ALT 20 05/02/2024                 ASSESSMENT AND PLAN:     Swathi Cain is a 58 y.o. female with a T1 cN0 M0 triple negative invasive ductal carcinoma of the left breast.    She has completed her adjuvant chemotherapy with dose dense AC followed by Taxol.   She has now had bilateral mastectomy.  We reviewed the pathology which shows evidence of partial treatment " response but still with about 1 cm of invasive disease in the breast.  There also were changes in the lymph node that raise the possibility of lymph node involvement that was treated by chemotherapy.  I have discussed the case with Dr. John who is going to look into any data regarding the use of radiotherapy in this situation.    We discussed adjuvant chemotherapy.  She is in a higher risk situation because of residual disease in the breast.  The standard of care in this situation would be 6 months of Xeloda which I would estimate would reduce her risk by somewhere around 5%.  We do have the Tropion 03 clinical trial which is looking at ddato-Dxd with or without durvalumab compared to Xeloda.  Meet with the research coordinator today.    In the meantime because of the potential dirk involvement I am going to do a PET/CT to rule out distant disease.    Will see her back in 2 weeks.    Total time of patient care on day of service including time prior to, face to face with patient, and following visit spent in reviewing records, path results, discussion with patient, and documentation/charting was > 43 minutes.                      Marga Arreola MD  [facility] Hematology and Oncology    5/22/2024          CC:

## 2024-05-22 ENCOUNTER — OFFICE VISIT (OUTPATIENT)
Dept: ONCOLOGY | Facility: CLINIC | Age: 59
End: 2024-05-22
Payer: COMMERCIAL

## 2024-05-22 VITALS
HEIGHT: 65 IN | OXYGEN SATURATION: 99 % | HEART RATE: 142 BPM | TEMPERATURE: 98.4 F | BODY MASS INDEX: 21.33 KG/M2 | RESPIRATION RATE: 16 BRPM | WEIGHT: 128 LBS

## 2024-05-22 DIAGNOSIS — C50.812 MALIGNANT NEOPLASM OF OVERLAPPING SITES OF LEFT BREAST IN FEMALE, ESTROGEN RECEPTOR NEGATIVE: Primary | ICD-10-CM

## 2024-05-22 DIAGNOSIS — Z17.1 MALIGNANT NEOPLASM OF OVERLAPPING SITES OF LEFT BREAST IN FEMALE, ESTROGEN RECEPTOR NEGATIVE: Primary | ICD-10-CM

## 2024-05-22 NOTE — LETTER
"May 22, 2024     Kiara Lopez MD  [address]  [place] [phone]    Patient: Swathi Cain   YOB: 1965   Date of Visit: 5/22/2024       Dear Kiara Lopez MD    Swathi Cain was in my office today. Below is a copy of my note.    If you have questions, please do not hesitate to call me. I look forward to following Swathi along with you.         Sincerely,        Marga Arreola MD        CC: ANABELA Cohen          PROBLEM LIST:  [id number] triple negative (her2 0+) invasive ductal carcinoma of the left breast  Biopsy of a 1.2 cm left breast mass on 10/4/23.  Pathology showed a high grade IDC.  Neoadjuvant chemotherapy with ddAC followed by taxol started 11/9/23  Bilateral mastectomy on 5/14/24.  Pathology showed a grade 2 IDC measuring 1.1 cm.  0/2 SLN involved.  There was evidence of of response in the invasive carcinoma, and possibly in the lymph nodes.  [id number]    Subjective    CHIEF COMPLAINT: breast cancer    HISTORY OF PRESENT ILLNESS:   Swathi Cain returns for follow-up.   She had bilateral mastectomy last week.  She says she is feeling okay.            Objective     Pulse (!) 142   Temp 98.4 °F (36.9 °C)   Resp 16   Ht 165.1 cm (65\")   Wt 58.1 kg (128 lb)   SpO2 99%   BMI 21.30 kg/m²   Vitals:    05/22/24 1401   PainSc: 0-No pain                     ECOG score: 0             General: well appearing female in no acute distress  Neuro: alert and oriented  HEENT: sclera anicteric  Extremeties: no lower extremity edema  Skin: no rashes, lesions, bruising, or petechiae  Psych: mood and affect appropriate            RECENT LABS:  Lab Results   Component Value Date    WBC 5.17 05/02/2024    HGB 13.7 05/02/2024    HCT 42.2 05/02/2024    .5 (H) 05/02/2024     05/02/2024       Lab Results   Component Value Date    GLUCOSE 99 05/02/2024    BUN 14 05/02/2024    CREATININE 0.71 05/02/2024    BCR 19.7 05/02/2024    K 3.9 05/02/2024    " CO2 24.0 05/02/2024    CALCIUM 9.6 05/02/2024    ALBUMIN 4.5 05/02/2024    AST 27 05/02/2024    ALT 20 05/02/2024                 ASSESSMENT AND PLAN:     Swathi Cain is a 58 y.o. female with a T1 cN0 M0 triple negative invasive ductal carcinoma of the left breast.    She has completed her adjuvant chemotherapy with dose dense AC followed by Taxol.   She has now had bilateral mastectomy.  We reviewed the pathology which shows evidence of partial treatment response but still with about 1 cm of invasive disease in the breast.  There also were changes in the lymph node that raise the possibility of lymph node involvement that was treated by chemotherapy.  I have discussed the case with Dr. John who is going to look into any data regarding the use of radiotherapy in this situation.    We discussed adjuvant chemotherapy.  She is in a higher risk situation because of residual disease in the breast.  The standard of care in this situation would be 6 months of Xeloda which I would estimate would reduce her risk by somewhere around 5%.  We do have the Tropion 03 clinical trial which is looking at ddato-Dxd with or without durvalumab compared to Xeloda.  Meet with the research coordinator today.    In the meantime because of the potential dirk involvement I am going to do a PET/CT to rule out distant disease.    Will see her back in 2 weeks.    Total time of patient care on day of service including time prior to, face to face with patient, and following visit spent in reviewing records, path results, discussion with patient, and documentation/charting was > 43 minutes.                      Marga Arreola MD  [facility] Hematology and Oncology    5/22/2024          CC:

## 2024-06-11 ENCOUNTER — HOSPITAL ENCOUNTER (OUTPATIENT)
Facility: HOSPITAL | Age: 59
Discharge: HOME | End: 2024-06-11
Payer: COMMERCIAL

## 2024-06-11 DIAGNOSIS — C50.812 MALIGNANT NEOPLASM OF OVERLAPPING SITES OF LEFT BREAST IN FEMALE, ESTROGEN RECEPTOR NEGATIVE: ICD-10-CM

## 2024-06-11 DIAGNOSIS — Z17.1 MALIGNANT NEOPLASM OF OVERLAPPING SITES OF LEFT BREAST IN FEMALE, ESTROGEN RECEPTOR NEGATIVE: ICD-10-CM

## 2024-06-11 PROCEDURE — 78815 PET IMAGE W/CT SKULL-THIGH: CPT

## 2024-06-11 PROCEDURE — A9552 F18 FDG: HCPCS | Performed by: INTERNAL MEDICINE

## 2024-06-11 PROCEDURE — 0 FLUDEOXYGLUCOSE F18 SOLUTION: Performed by: INTERNAL MEDICINE

## 2024-06-11 RX ADMIN — FLUDEOXYGLUCOSE F18 1 DOSE: 300 INJECTION INTRAVENOUS at 14:24

## 2024-06-13 ENCOUNTER — TELEPHONE (OUTPATIENT)
Dept: ONCOLOGY | Facility: CLINIC | Age: 59
End: 2024-06-13

## 2024-06-13 ENCOUNTER — OFFICE VISIT (OUTPATIENT)
Dept: ONCOLOGY | Facility: CLINIC | Age: 59
End: 2024-06-13
Payer: COMMERCIAL

## 2024-06-13 ENCOUNTER — RESEARCH ENCOUNTER (OUTPATIENT)
Dept: OTHER | Facility: OTHER | Age: 59
End: 2024-06-13
Payer: COMMERCIAL

## 2024-06-13 ENCOUNTER — HOSPITAL ENCOUNTER (OUTPATIENT)
Dept: ONCOLOGY | Facility: HOSPITAL | Age: 59
Discharge: HOME | End: 2024-06-13
Admitting: INTERNAL MEDICINE
Payer: COMMERCIAL

## 2024-06-13 VITALS
SYSTOLIC BLOOD PRESSURE: 138 MMHG | BODY MASS INDEX: 20.49 KG/M2 | DIASTOLIC BLOOD PRESSURE: 83 MMHG | HEIGHT: 65 IN | RESPIRATION RATE: 16 BRPM | OXYGEN SATURATION: 100 % | WEIGHT: 123 LBS | HEART RATE: 147 BPM | TEMPERATURE: 96.7 F

## 2024-06-13 DIAGNOSIS — Z17.1 MALIGNANT NEOPLASM OF OVERLAPPING SITES OF LEFT BREAST IN FEMALE, ESTROGEN RECEPTOR NEGATIVE: Primary | ICD-10-CM

## 2024-06-13 DIAGNOSIS — C50.812 MALIGNANT NEOPLASM OF OVERLAPPING SITES OF LEFT BREAST IN FEMALE, ESTROGEN RECEPTOR NEGATIVE: Primary | ICD-10-CM

## 2024-06-13 PROCEDURE — 96523 IRRIG DRUG DELIVERY DEVICE: CPT

## 2024-06-13 PROCEDURE — 25010000002 HEPARIN LOCK FLUSH PER 10 UNITS: Performed by: INTERNAL MEDICINE

## 2024-06-13 RX ORDER — SODIUM CHLORIDE 0.9 % (FLUSH) 0.9 %
10 SYRINGE (ML) INJECTION AS NEEDED
OUTPATIENT
Start: 2024-06-13

## 2024-06-13 RX ORDER — SODIUM CHLORIDE 0.9 % (FLUSH) 0.9 %
10 SYRINGE (ML) INJECTION AS NEEDED
Status: DISCONTINUED | OUTPATIENT
Start: 2024-06-13 | End: 2024-06-14 | Stop reason: HOSPADM

## 2024-06-13 RX ORDER — HEPARIN SODIUM (PORCINE) LOCK FLUSH IV SOLN 100 UNIT/ML 100 UNIT/ML
500 SOLUTION INTRAVENOUS AS NEEDED
OUTPATIENT
Start: 2024-06-13

## 2024-06-13 RX ORDER — HEPARIN SODIUM (PORCINE) LOCK FLUSH IV SOLN 100 UNIT/ML 100 UNIT/ML
500 SOLUTION INTRAVENOUS AS NEEDED
Status: DISCONTINUED | OUTPATIENT
Start: 2024-06-13 | End: 2024-06-14 | Stop reason: HOSPADM

## 2024-06-13 RX ADMIN — HEPARIN 500 UNITS: 100 SYRINGE at 13:48

## 2024-06-13 RX ADMIN — Medication 10 ML: at 13:48

## 2024-06-13 NOTE — TELEPHONE ENCOUNTER
Called patient re: final PET report.  Possible subtle lesion at C2.  Will do bone scan for further evaluation.  Swathi reports no symptoms in this area.

## 2024-06-13 NOTE — PROGRESS NOTES
"      PROBLEM LIST:  [id number] triple negative (her2 0+) invasive ductal carcinoma of the left breast  Biopsy of a 1.2 cm left breast mass on 10/4/23.  Pathology showed a high grade IDC.  Neoadjuvant chemotherapy with ddAC followed by taxol started 11/9/23  Bilateral mastectomy on 5/14/24.  Pathology showed a grade 2 IDC measuring 1.1 cm.  0/2 SLN involved.  There was evidence of of response in the invasive carcinoma, and possibly in the lymph nodes.  [id number]    Subjective     CHIEF COMPLAINT: breast cancer    HISTORY OF PRESENT ILLNESS:   Swathi Cain returns for follow-up.  She is here to review her PET/CT results.          Objective      /83   Pulse (!) 147   Temp 96.7 °F (35.9 °C)   Resp 16   Ht 165.1 cm (65\")   Wt 55.8 kg (123 lb)   SpO2 100%   BMI 20.47 kg/m²   Vitals:    06/13/24 1256   PainSc: 0-No pain                     ECOG score: 0             General: well appearing female in no acute distress  Neuro: alert and oriented  HEENT: sclera anicteric  Extremeties: no lower extremity edema  Skin: no rashes, lesions, bruising, or petechiae  Psych: mood and affect appropriate            RECENT LABS:  Lab Results   Component Value Date    WBC 5.17 05/02/2024    HGB 13.7 05/02/2024    HCT 42.2 05/02/2024    .5 (H) 05/02/2024     05/02/2024       Lab Results   Component Value Date    GLUCOSE 99 05/02/2024    BUN 14 05/02/2024    CREATININE 0.71 05/02/2024    BCR 19.7 05/02/2024    K 3.9 05/02/2024    CO2 24.0 05/02/2024    CALCIUM 9.6 05/02/2024    ALBUMIN 4.5 05/02/2024    AST 27 05/02/2024    ALT 20 05/02/2024         I personally reviewed the PET/CT images and discussed with radiology..  There is a hypermetabolic lesion in the liver.        ASSESSMENT AND PLAN:     Swathi Cain is a 58 y.o. female with a T1 cN0 M0 triple negative invasive ductal carcinoma of the left breast.    She has completed her adjuvant chemotherapy with dose dense AC followed by Taxol.   She has now " had bilateral mastectomy.  We reviewed her PET/CT results which does show a worrisome lesion in the liver.  I am concerned this could be metastatic disease but recommended biopsy to be certain what we are dealing with.  We will get this set up as soon as possible and I will see her back following to review the results.    We discussed that if the biopsy does show metastatic breast cancer, that chemotherapy would be the treatment of choice.  We will send molecular testing on her tumor to determine if she is a candidate for the addition of immunotherapy.  We also discussed that since we do not have any prior baseline imaging, it is impossible to know whether this lesion in the liver was responding to her current treatment or not.    Will see her back in 2 weeks.    Total time of patient care on day of service including time prior to, face to face with patient, and following visit spent in reviewing records, path results, discussion with patient, discussion with other providers, and documentation/charting was > 47 minutes.                      Marga Arreola MD  [facility] Hematology and Oncology    6/13/2024          CC:           Home

## 2024-06-13 NOTE — RESEARCH
Patient Name:  Swathi Cain  YOB: 1965  Patient Age:  58 y.o.  Patient's Sex:  female    Date of Service:  06/13/2024    Provider:  ADAM rAreola MD                     RESEARCH NOTE                  Protocol --ICE COMPRESS: Randomized Trial of Limb Cryocompression Versus Continuous Compression Versus Low Cyclic Compression for the Prevention of Taxane-Induced Peripheral Neuropathy   NCT #[id number]     Subject ID:  [id number]  ARM 3:  Low Cyclic Compression     Week 24 Assessment Timepoint:  Required QOLs completed.  The following neuroassessments were completed:  Neuropen (monofilament and Neurotip)  [place] ([address])  Timed Get Up and Go    Medications reviewed.    No report of any neuropathy.  No study related AEs identified.       Next timepoint assessment is due at week 52 (Jan2025).     Thank you.  Rosio Moise, RN, BSN, CRC

## 2024-06-19 ENCOUNTER — HOSPITAL ENCOUNTER (OUTPATIENT)
Dept: NUCLEAR MEDICINE | Facility: HOSPITAL | Age: 59
Discharge: HOME | End: 2024-06-19
Payer: COMMERCIAL

## 2024-06-19 ENCOUNTER — TELEPHONE (OUTPATIENT)
Dept: INFUSION THERAPY | Facility: HOSPITAL | Age: 59
End: 2024-06-19
Payer: COMMERCIAL

## 2024-06-19 DIAGNOSIS — C50.812 MALIGNANT NEOPLASM OF OVERLAPPING SITES OF LEFT BREAST IN FEMALE, ESTROGEN RECEPTOR NEGATIVE: ICD-10-CM

## 2024-06-19 DIAGNOSIS — Z17.1 MALIGNANT NEOPLASM OF OVERLAPPING SITES OF LEFT BREAST IN FEMALE, ESTROGEN RECEPTOR NEGATIVE: ICD-10-CM

## 2024-06-19 LAB
CYTO UR: NORMAL
LAB AP CASE REPORT: NORMAL
LAB AP CLINICAL INFORMATION: NORMAL
LAB AP DIAGNOSIS COMMENT: NORMAL
Lab: NORMAL
PATH REPORT.ADDENDUM SPEC: NORMAL
PATH REPORT.FINAL DX SPEC: NORMAL
PATH REPORT.GROSS SPEC: NORMAL

## 2024-06-19 PROCEDURE — 0 TECHNETIUM MEDRONATE KIT: Performed by: INTERNAL MEDICINE

## 2024-06-19 PROCEDURE — 78306 BONE IMAGING WHOLE BODY: CPT

## 2024-06-19 PROCEDURE — A9503 TC99M MEDRONATE: HCPCS | Performed by: INTERNAL MEDICINE

## 2024-06-19 RX ORDER — TC 99M MEDRONATE 20 MG/10ML
26 INJECTION, POWDER, LYOPHILIZED, FOR SOLUTION INTRAVENOUS
Status: COMPLETED | OUTPATIENT
Start: 2024-06-19 | End: 2024-06-19

## 2024-06-19 RX ADMIN — TC 99M MEDRONATE 26 MILLICURIE: 20 INJECTION, POWDER, LYOPHILIZED, FOR SOLUTION INTRAVENOUS at 08:58

## 2024-06-19 NOTE — TELEPHONE ENCOUNTER
Patient's spouse Dewey contacted as pre-procedure phone call prior to planned Liver biopsy for 6/21/24. Reviewed with patient's spouse arrival time, location, nothing to eat or drink by mouth, okay to take blood pressure medications morning of procedure with a small sip of water,  needed, reviewed procedure instructions and allowed time for questions, and reviewed home medications, allergies, and medical history.

## 2024-06-20 ENCOUNTER — PREP FOR SURGERY (OUTPATIENT)
Dept: OTHER | Facility: HOSPITAL | Age: 59
End: 2024-06-20
Payer: COMMERCIAL

## 2024-06-20 RX ORDER — SODIUM CHLORIDE 9 MG/ML
40 INJECTION, SOLUTION INTRAVENOUS AS NEEDED
Status: CANCELLED | OUTPATIENT
Start: 2024-06-20

## 2024-06-20 RX ORDER — SODIUM CHLORIDE 0.9 % (FLUSH) 0.9 %
3 SYRINGE (ML) INJECTION EVERY 12 HOURS SCHEDULED
Status: CANCELLED | OUTPATIENT
Start: 2024-06-20

## 2024-06-20 RX ORDER — SODIUM CHLORIDE 0.9 % (FLUSH) 0.9 %
10 SYRINGE (ML) INJECTION AS NEEDED
Status: CANCELLED | OUTPATIENT
Start: 2024-06-20

## 2024-06-21 ENCOUNTER — HOSPITAL ENCOUNTER (OUTPATIENT)
Dept: ULTRASOUND IMAGING | Facility: HOSPITAL | Age: 59
Discharge: HOME | End: 2024-06-21
Payer: COMMERCIAL

## 2024-06-21 VITALS
SYSTOLIC BLOOD PRESSURE: 121 MMHG | BODY MASS INDEX: 20.79 KG/M2 | RESPIRATION RATE: 18 BRPM | WEIGHT: 124.8 LBS | TEMPERATURE: 97.5 F | DIASTOLIC BLOOD PRESSURE: 67 MMHG | OXYGEN SATURATION: 97 % | HEART RATE: 85 BPM | HEIGHT: 65 IN

## 2024-06-21 DIAGNOSIS — C50.812 MALIGNANT NEOPLASM OF OVERLAPPING SITES OF LEFT BREAST IN FEMALE, ESTROGEN RECEPTOR NEGATIVE: ICD-10-CM

## 2024-06-21 DIAGNOSIS — Z17.1 MALIGNANT NEOPLASM OF OVERLAPPING SITES OF LEFT BREAST IN FEMALE, ESTROGEN RECEPTOR NEGATIVE: ICD-10-CM

## 2024-06-21 LAB
BASOPHILS # BLD AUTO: 0.02 10*3/MM3 (ref 0–0.2)
BASOPHILS NFR BLD AUTO: 0.5 % (ref 0–1.5)
DEPRECATED RDW RBC AUTO: 45.2 FL (ref 37–54)
EOSINOPHIL # BLD AUTO: 0.11 10*3/MM3 (ref 0–0.4)
EOSINOPHIL NFR BLD AUTO: 2.8 % (ref 0.3–6.2)
ERYTHROCYTE [DISTWIDTH] IN BLOOD BY AUTOMATED COUNT: 12.4 % (ref 12.3–15.4)
HCT VFR BLD AUTO: 39.5 % (ref 34–46.6)
HGB BLD-MCNC: 13.4 G/DL (ref 12–15.9)
IMM GRANULOCYTES # BLD AUTO: 0.01 10*3/MM3 (ref 0–0.05)
IMM GRANULOCYTES NFR BLD AUTO: 0.3 % (ref 0–0.5)
INR PPP: 0.92 (ref 0.89–1.12)
LYMPHOCYTES # BLD AUTO: 1.32 10*3/MM3 (ref 0.7–3.1)
LYMPHOCYTES NFR BLD AUTO: 33.2 % (ref 19.6–45.3)
MCH RBC QN AUTO: 33.7 PG (ref 26.6–33)
MCHC RBC AUTO-ENTMCNC: 33.9 G/DL (ref 31.5–35.7)
MCV RBC AUTO: 99.2 FL (ref 79–97)
MONOCYTES # BLD AUTO: 0.36 10*3/MM3 (ref 0.1–0.9)
MONOCYTES NFR BLD AUTO: 9.1 % (ref 5–12)
NEUTROPHILS NFR BLD AUTO: 2.15 10*3/MM3 (ref 1.7–7)
NEUTROPHILS NFR BLD AUTO: 54.1 % (ref 42.7–76)
NRBC BLD AUTO-RTO: 0 /100 WBC (ref 0–0.2)
PLAT MORPH BLD: NORMAL
PLATELET # BLD AUTO: 119 10*3/MM3 (ref 140–450)
PMV BLD AUTO: 9.5 FL (ref 6–12)
PROTHROMBIN TIME: 12.5 SECONDS (ref 12.2–14.5)
RBC # BLD AUTO: 3.98 10*6/MM3 (ref 3.77–5.28)
RBC MORPH BLD: NORMAL
WBC MORPH BLD: NORMAL
WBC NRBC COR # BLD AUTO: 3.97 10*3/MM3 (ref 3.4–10.8)

## 2024-06-21 PROCEDURE — 85610 PROTHROMBIN TIME: CPT | Performed by: NURSE PRACTITIONER

## 2024-06-21 PROCEDURE — 85025 COMPLETE CBC W/AUTO DIFF WBC: CPT | Performed by: NURSE PRACTITIONER

## 2024-06-21 PROCEDURE — 25010000002 MIDAZOLAM PER 1 MG: Performed by: RADIOLOGY

## 2024-06-21 PROCEDURE — 85007 BL SMEAR W/DIFF WBC COUNT: CPT | Performed by: NURSE PRACTITIONER

## 2024-06-21 PROCEDURE — 25010000002 FENTANYL CITRATE (PF) 50 MCG/ML SOLUTION: Performed by: RADIOLOGY

## 2024-06-21 PROCEDURE — 76942 ECHO GUIDE FOR BIOPSY: CPT

## 2024-06-21 RX ORDER — FENTANYL CITRATE 50 UG/ML
INJECTION, SOLUTION INTRAMUSCULAR; INTRAVENOUS AS NEEDED
Status: COMPLETED | OUTPATIENT
Start: 2024-06-21 | End: 2024-06-21

## 2024-06-21 RX ORDER — FENTANYL CITRATE 50 UG/ML
INJECTION, SOLUTION INTRAMUSCULAR; INTRAVENOUS
Status: DISCONTINUED
Start: 2024-06-21 | End: 2024-06-21 | Stop reason: WASHOUT

## 2024-06-21 RX ORDER — SODIUM CHLORIDE 0.9 % (FLUSH) 0.9 %
10 SYRINGE (ML) INJECTION AS NEEDED
Status: DISCONTINUED | OUTPATIENT
Start: 2024-06-21 | End: 2024-06-22 | Stop reason: HOSPADM

## 2024-06-21 RX ORDER — SODIUM CHLORIDE 9 MG/ML
40 INJECTION, SOLUTION INTRAVENOUS AS NEEDED
Status: DISCONTINUED | OUTPATIENT
Start: 2024-06-21 | End: 2024-06-22 | Stop reason: HOSPADM

## 2024-06-21 RX ORDER — MIDAZOLAM HYDROCHLORIDE 1 MG/ML
INJECTION INTRAMUSCULAR; INTRAVENOUS AS NEEDED
Status: COMPLETED | OUTPATIENT
Start: 2024-06-21 | End: 2024-06-21

## 2024-06-21 RX ORDER — HYDROCODONE BITARTRATE AND ACETAMINOPHEN 5; 325 MG/1; MG/1
1 TABLET ORAL EVERY 4 HOURS PRN
Status: DISCONTINUED | OUTPATIENT
Start: 2024-06-21 | End: 2024-06-22 | Stop reason: HOSPADM

## 2024-06-21 RX ORDER — FENTANYL CITRATE 50 UG/ML
INJECTION, SOLUTION INTRAMUSCULAR; INTRAVENOUS
Status: DISPENSED
Start: 2024-06-21 | End: 2024-06-21

## 2024-06-21 RX ORDER — MIDAZOLAM HYDROCHLORIDE 1 MG/ML
INJECTION INTRAMUSCULAR; INTRAVENOUS
Status: DISPENSED
Start: 2024-06-21 | End: 2024-06-21

## 2024-06-21 RX ORDER — LIDOCAINE HYDROCHLORIDE 10 MG/ML
5 INJECTION, SOLUTION EPIDURAL; INFILTRATION; INTRACAUDAL; PERINEURAL ONCE
Status: COMPLETED | OUTPATIENT
Start: 2024-06-21 | End: 2024-06-21

## 2024-06-21 RX ORDER — SODIUM CHLORIDE 0.9 % (FLUSH) 0.9 %
3 SYRINGE (ML) INJECTION EVERY 12 HOURS SCHEDULED
Status: DISCONTINUED | OUTPATIENT
Start: 2024-06-21 | End: 2024-06-22 | Stop reason: HOSPADM

## 2024-06-21 RX ADMIN — FENTANYL CITRATE 50 MCG: 50 INJECTION, SOLUTION INTRAMUSCULAR; INTRAVENOUS at 08:54

## 2024-06-21 RX ADMIN — MIDAZOLAM HYDROCHLORIDE 1 MG: 1 INJECTION, SOLUTION INTRAMUSCULAR; INTRAVENOUS at 08:48

## 2024-06-21 RX ADMIN — FENTANYL CITRATE 50 MCG: 50 INJECTION, SOLUTION INTRAMUSCULAR; INTRAVENOUS at 08:48

## 2024-06-21 RX ADMIN — MIDAZOLAM HYDROCHLORIDE 1 MG: 1 INJECTION, SOLUTION INTRAMUSCULAR; INTRAVENOUS at 08:54

## 2024-06-21 RX ADMIN — LIDOCAINE HYDROCHLORIDE 5 ML: 10 INJECTION, SOLUTION EPIDURAL; INFILTRATION; INTRACAUDAL; PERINEURAL at 09:14

## 2024-06-21 NOTE — NURSING NOTE
US guided liver biopsy performed by Dr Ruffin. 2 core biopsies obtained, labeled, and taken to lab by tech. Patient given 100 mcg Fentanyl  and 2 mg Versed with sedation time of 13 minutes. Patient tolerated well. Placed on right side with bedrest orders. Report given to WALDO MEJÍA.

## 2024-06-21 NOTE — POST-PROCEDURE NOTE
The following procedure was performed: USG liver bx    Please see corresponding Radiology report for in detail procedural information. The Radiology report will be dictated shortly, if not done so already. Please see the IR RN note for the information regarding medicines administered if any, casimiro-procedural vitals and I/O information.

## 2024-06-21 NOTE — PRE-PROCEDURE NOTE
"[facility]   Vascular Interventional Radiology  History & Physicial        Patient Name:Swathi Cain    : 1965    MRN: [id number]    Primary Care Physician: Evelyn Del Angel APRN    Referring Physician: Marga Arreola MD     Date of admission: 2024    Subjective     Reason for Consult: Liver biopsy    History of Present Illness     Swathi Cain is a 58 y.o. female referred to IR as noted above.      Active Hospital Problems:  There are no active hospital problems to display for this patient.      Personal History     History reviewed. No pertinent past medical history.    Past Surgical History:   Procedure Laterality Date    COLONOSCOPY      MASTECTOMY W/ SENTINEL NODE BIOPSY Bilateral 2024    Procedure: BREAST MASTECTOMY WITH SENTINEL NODE BIOPSY LEFT, RIGH PROPHYLACTIC MASTECTOMY;  Surgeon: Kiara Lopez MD;  Location: [facility];  Service: General;  Laterality: Bilateral;    WISDOM TOOTH EXTRACTION         Family History: Her family history is not on file.     Social History: She  reports that she has never smoked. She has never used smokeless tobacco. She reports that she does not currently use alcohol. She reports that she does not use drugs.    Home Medications:  Magic Mouthwash Oral Suspension (diphenhydrAMINE HCl - aluminum & magnesium hydroxide-simethicone - lidocaine - nystatin), cetirizine, multivitamin with minerals, rosuvastatin, sertraline, and vitamin B-12    Current Medications:    fentaNYL citrate (PF)    midazolam    sodium chloride    sodium chloride    sodium chloride     Allergies:  No Known Allergies    Review of Systems    IR Procedure pertinent significant findings are mentioned in the PMH and PSH above.    Objective     Visit Vitals  /82 (BP Location: Right arm, Patient Position: Lying)   Pulse 90   Temp 97.5 °F (36.4 °C) (Tympanic)   Resp 16   Ht 165.1 cm (65\")   Wt 56.6 kg (124 lb 12.8 oz)   SpO2 100%   BMI 20.77 kg/m²        Physical " "Exam    A&Ox3.   Able to communicate  No Apparent Distress  Average physique   CVS: VS as noted. Chart reviewed. Stable for the procedure.  Respiratory: Non labored breathing. No signs of respiratory compromise.    Result Review      I have personally reviewed the results from the time of this admission to 6/21/2024 08:21 EDT and agree with these findings.  [x]  Laboratory  []  Microbiology  [x]  Radiology  []  EKG/Telemetry   []  Cardiology/Vascular   []  Pathology  []  Old records  []  Other:    Most notable findings include: As noted:    Results from last 7 days   Lab Units 06/21/24  0704   INR  0.92   WBC 10*3/mm3 3.97   HEMOGLOBIN g/dL 13.4   HEMATOCRIT % 39.5   PLATELETS 10*3/mm3 119*                   CrCl cannot be calculated (Patient's most recent lab result is older than the maximum 30 days allowed.). No results found for: \"CREATININE\"    No results found for: \"COVID19\"     No results found for: \"PREGTESTUR\", \"PREGSERUM\", \"HCG\", \"HCGQUANT\"     ASA SCALE ASSESSMENT (applicable ONLY if sedation planned):   3     MALLAMPATI CLASSIFICATION (applicable ONLY if sedation planned):   2    Assessment / Plan     Swathi Cain is a 58 y.o. female referred to the IR service with above problem.    Plan:   As above.    Notice: The note was created before the performance of the procedure. It might have been left in the pending status and signed off after the procedure. The time stamp on the note may be misleading.    Twin Ruffin MD   Vascular Interventional Radiology  06/21/24   8:21 AM EDT     "

## 2024-06-21 NOTE — DISCHARGE INSTRUCTIONS
Avoid any strenuous activities, pulling, tugging, straining or lifting anything over 10 pounds for the next 48 hours.    You may have soreness at the biopsy site or shoulder pain after your liver biopsy. You may take Tylenol or Ibuprofen to help relieve pain/soreness if not contraindicated.    You may remove the bandage tomorrow and shower tomorrow; but you will need to avoid tub baths or any situation where your are submersed in water for the next 4 or 5 days to avoid the risk of infection.     DO NOT DRIVE ON THE DAY OF YOUR PROCEDURE.    If you have any problems or concern please contact your physician's office.      SEEK IMMEDIATE MEDICAL ATTENTION FOR ANY UNCONTROLLED PAIN IN SHOULDER/ABDOMEN ON PROCEDURAL SIDE, DIFFICULTY BREATHING, CHEST PAIN, DIZZINESS ON STANDING, UNUSUAL BRUISING AT OR NEAR SITE, PERSISTENT NAUSEA AND VOMITING, OR SIGNS OF INFECTION SUCH AS CHILLS OR FEVER GREATER THAN 101.

## 2024-06-24 ENCOUNTER — TELEPHONE (OUTPATIENT)
Dept: INFUSION THERAPY | Facility: HOSPITAL | Age: 59
End: 2024-06-24
Payer: COMMERCIAL

## 2024-06-26 ENCOUNTER — OFFICE VISIT (OUTPATIENT)
Dept: ONCOLOGY | Facility: CLINIC | Age: 59
End: 2024-06-26
Payer: COMMERCIAL

## 2024-06-26 VITALS — BODY MASS INDEX: 20.33 KG/M2 | WEIGHT: 122 LBS | HEIGHT: 65 IN

## 2024-06-26 DIAGNOSIS — Z17.1 MALIGNANT NEOPLASM OF OVERLAPPING SITES OF LEFT BREAST IN FEMALE, ESTROGEN RECEPTOR NEGATIVE: Primary | ICD-10-CM

## 2024-06-26 DIAGNOSIS — C50.812 MALIGNANT NEOPLASM OF OVERLAPPING SITES OF LEFT BREAST IN FEMALE, ESTROGEN RECEPTOR NEGATIVE: Primary | ICD-10-CM

## 2024-06-26 LAB
CYTO UR: NORMAL
CYTO UR: NORMAL
LAB AP CASE REPORT: NORMAL
LAB AP CASE REPORT: NORMAL
LAB AP CLINICAL INFORMATION: NORMAL
LAB AP CLINICAL INFORMATION: NORMAL
LAB AP DIAGNOSIS COMMENT: NORMAL
LAB AP DIAGNOSIS COMMENT: NORMAL
LAB AP FOUNDATION ONE, ADDENDUM: NORMAL
Lab: NORMAL
PATH REPORT.ADDENDUM SPEC: NORMAL
PATH REPORT.FINAL DX SPEC: NORMAL
PATH REPORT.FINAL DX SPEC: NORMAL
PATH REPORT.GROSS SPEC: NORMAL
PATH REPORT.GROSS SPEC: NORMAL

## 2024-06-26 NOTE — PROGRESS NOTES
PROBLEM LIST:  [id number] triple negative (her2 0+) invasive ductal carcinoma of the left breast  Biopsy of a 1.2 cm left breast mass on 10/4/23.  Pathology showed a high grade IDC.  Neoadjuvant chemotherapy with ddAC followed by taxol started 11/9/23  Bilateral mastectomy on 5/14/24.  Pathology showed a grade 2 IDC measuring 1.1 cm.  0/2 SLN involved.  There was evidence of of response in the invasive carcinoma, and possibly in the lymph nodes.  [id number]  PET/CT 6/11/24 showed a hypermetabolic left lobe liver lesion, several small foci of uptake in normal appearing mediastinal and hilar LN, subtle uptake left C2 pedicle.  Subsequent bone scan 6/19/24 showed no suggestion of bone metastases.  Liver biopsy 6/21/24 showed normal hepatic tissue.    Subjective     CHIEF COMPLAINT: breast cancer    HISTORY OF PRESENT ILLNESS:   Swathi Cain returns for follow-up.          Objective      There were no vitals taken for this visit.  There were no vitals filed for this visit.                                  General: well appearing female in no acute distress  Neuro: alert and oriented  HEENT: sclera anicteric  Extremeties: no lower extremity edema  Skin: no rashes, lesions, bruising, or petechiae  Psych: mood and affect appropriate            RECENT LABS:  Lab Results   Component Value Date    WBC 3.97 06/21/2024    HGB 13.4 06/21/2024    HCT 39.5 06/21/2024    MCV 99.2 (H) 06/21/2024     (L) 06/21/2024       Lab Results   Component Value Date    GLUCOSE 99 05/02/2024    BUN 14 05/02/2024    CREATININE 0.71 05/02/2024    BCR 19.7 05/02/2024    K 3.9 05/02/2024    CO2 24.0 05/02/2024    CALCIUM 9.6 05/02/2024    ALBUMIN 4.5 05/02/2024    AST 27 05/02/2024    ALT 20 05/02/2024               ASSESSMENT AND PLAN:     Swathi Cain is a 58 y.o. female with a T1 cN0 M0 triple negative invasive ductal carcinoma of the left breast.    Her liver biopsy shows normal liver tissue.  I have discussed this with our  tumor board and also with radiology.  The PET scan findings remain concerning.  We will plan to get a liver MRI.  We likely will need a repeat attempt at biopsy.  She is agreeable to proceed.  Whether she has metastatic cancer in her liver is very consequential for planning her treatment going forward and it is important that we have a definitive answer on this.    Bone scan is unremarkable.    PDL1 CPS < 1 on original resection.    Will see her back in 2 weeks.    Total time of patient care on day of service including time prior to, face to face with patient, and following visit spent in reviewing records, path results, imaging findings, discussion with patient, discussion with other providers, and documentation/charting was > 42 minutes.                      Marga Arreola MD  [facility] Hematology and Oncology    6/25/2024          CC:

## 2024-07-04 ENCOUNTER — HOSPITAL ENCOUNTER (OUTPATIENT)
Facility: HOSPITAL | Age: 59
Discharge: HOME | End: 2024-07-04
Admitting: INTERNAL MEDICINE
Payer: COMMERCIAL

## 2024-07-04 DIAGNOSIS — Z17.1 MALIGNANT NEOPLASM OF OVERLAPPING SITES OF LEFT BREAST IN FEMALE, ESTROGEN RECEPTOR NEGATIVE: ICD-10-CM

## 2024-07-04 DIAGNOSIS — C50.812 MALIGNANT NEOPLASM OF OVERLAPPING SITES OF LEFT BREAST IN FEMALE, ESTROGEN RECEPTOR NEGATIVE: ICD-10-CM

## 2024-07-04 PROCEDURE — 0 GADOBENATE DIMEGLUMINE 529 MG/ML SOLUTION: Performed by: INTERNAL MEDICINE

## 2024-07-04 PROCEDURE — A9577 INJ MULTIHANCE: HCPCS | Performed by: INTERNAL MEDICINE

## 2024-07-04 PROCEDURE — 74183 MRI ABD W/O CNTR FLWD CNTR: CPT

## 2024-07-04 RX ADMIN — GADOBENATE DIMEGLUMINE 14 ML: 529 INJECTION, SOLUTION INTRAVENOUS at 16:26

## 2024-07-05 ENCOUNTER — APPOINTMENT (OUTPATIENT)
Dept: NUCLEAR MEDICINE | Facility: HOSPITAL | Age: 59
End: 2024-07-05
Payer: COMMERCIAL

## 2024-07-05 DIAGNOSIS — C50.812 MALIGNANT NEOPLASM OF OVERLAPPING SITES OF LEFT BREAST IN FEMALE, ESTROGEN RECEPTOR NEGATIVE: Primary | ICD-10-CM

## 2024-07-05 DIAGNOSIS — Z17.1 MALIGNANT NEOPLASM OF OVERLAPPING SITES OF LEFT BREAST IN FEMALE, ESTROGEN RECEPTOR NEGATIVE: Primary | ICD-10-CM

## 2024-07-09 ENCOUNTER — TELEPHONE (OUTPATIENT)
Dept: ONCOLOGY | Facility: CLINIC | Age: 59
End: 2024-07-09
Payer: COMMERCIAL

## 2024-07-18 ENCOUNTER — HOSPITAL ENCOUNTER (OUTPATIENT)
Dept: ONCOLOGY | Facility: HOSPITAL | Age: 59
Discharge: HOME | End: 2024-07-18
Payer: COMMERCIAL

## 2024-07-18 ENCOUNTER — HOSPITAL ENCOUNTER (OUTPATIENT)
Dept: PET IMAGING | Facility: HOSPITAL | Age: 59
Discharge: HOME | End: 2024-07-18
Payer: COMMERCIAL

## 2024-07-18 VITALS
HEART RATE: 104 BPM | HEIGHT: 65 IN | BODY MASS INDEX: 20.66 KG/M2 | SYSTOLIC BLOOD PRESSURE: 130 MMHG | DIASTOLIC BLOOD PRESSURE: 66 MMHG | RESPIRATION RATE: 16 BRPM | WEIGHT: 124 LBS | TEMPERATURE: 97.1 F

## 2024-07-18 DIAGNOSIS — C50.812 MALIGNANT NEOPLASM OF OVERLAPPING SITES OF LEFT BREAST IN FEMALE, ESTROGEN RECEPTOR NEGATIVE: ICD-10-CM

## 2024-07-18 DIAGNOSIS — Z17.1 MALIGNANT NEOPLASM OF OVERLAPPING SITES OF LEFT BREAST IN FEMALE, ESTROGEN RECEPTOR NEGATIVE: Primary | ICD-10-CM

## 2024-07-18 DIAGNOSIS — Z17.1 MALIGNANT NEOPLASM OF OVERLAPPING SITES OF LEFT BREAST IN FEMALE, ESTROGEN RECEPTOR NEGATIVE: ICD-10-CM

## 2024-07-18 DIAGNOSIS — C50.812 MALIGNANT NEOPLASM OF OVERLAPPING SITES OF LEFT BREAST IN FEMALE, ESTROGEN RECEPTOR NEGATIVE: Primary | ICD-10-CM

## 2024-07-18 LAB — GLUCOSE BLDC GLUCOMTR-MCNC: 95 MG/DL (ref 70–130)

## 2024-07-18 PROCEDURE — 82948 REAGENT STRIP/BLOOD GLUCOSE: CPT

## 2024-07-18 PROCEDURE — 0 FLUDEOXYGLUCOSE F18 SOLUTION: Performed by: INTERNAL MEDICINE

## 2024-07-18 PROCEDURE — 25010000002 HEPARIN LOCK FLUSH PER 10 UNITS: Performed by: INTERNAL MEDICINE

## 2024-07-18 PROCEDURE — A9552 F18 FDG: HCPCS | Performed by: INTERNAL MEDICINE

## 2024-07-18 PROCEDURE — 78815 PET IMAGE W/CT SKULL-THIGH: CPT

## 2024-07-18 PROCEDURE — 96523 IRRIG DRUG DELIVERY DEVICE: CPT

## 2024-07-18 RX ORDER — HEPARIN SODIUM (PORCINE) LOCK FLUSH IV SOLN 100 UNIT/ML 100 UNIT/ML
500 SOLUTION INTRAVENOUS AS NEEDED
OUTPATIENT
Start: 2024-07-18

## 2024-07-18 RX ORDER — HEPARIN SODIUM (PORCINE) LOCK FLUSH IV SOLN 100 UNIT/ML 100 UNIT/ML
500 SOLUTION INTRAVENOUS AS NEEDED
Status: DISCONTINUED | OUTPATIENT
Start: 2024-07-18 | End: 2024-07-19 | Stop reason: HOSPADM

## 2024-07-18 RX ORDER — SODIUM CHLORIDE 0.9 % (FLUSH) 0.9 %
10 SYRINGE (ML) INJECTION AS NEEDED
OUTPATIENT
Start: 2024-07-18

## 2024-07-18 RX ADMIN — FLUDEOXYGLUCOSE F 18 1 DOSE: 200 INJECTION, SOLUTION INTRAVENOUS at 11:00

## 2024-07-18 RX ADMIN — HEPARIN 500 UNITS: 100 SYRINGE at 13:41

## 2024-07-24 ENCOUNTER — TELEPHONE (OUTPATIENT)
Dept: ONCOLOGY | Facility: CLINIC | Age: 59
End: 2024-07-24
Payer: COMMERCIAL

## 2024-07-24 NOTE — TELEPHONE ENCOUNTER
Called patient after talking with Dr. Ruffin and Dr. Lopez.  Surgical biopsy most likely to give us a definitive answer.  She is willing to proceed.

## 2024-07-25 ENCOUNTER — LAB (OUTPATIENT)
Dept: LAB | Facility: HOSPITAL | Age: 59
End: 2024-07-25
Payer: COMMERCIAL

## 2024-07-25 DIAGNOSIS — Z17.1 MALIGNANT NEOPLASM OF OVERLAPPING SITES OF LEFT BREAST IN FEMALE, ESTROGEN RECEPTOR NEGATIVE: ICD-10-CM

## 2024-07-25 DIAGNOSIS — R30.0 DYSURIA: ICD-10-CM

## 2024-07-25 DIAGNOSIS — Z17.1 MALIGNANT NEOPLASM OF OVERLAPPING SITES OF LEFT BREAST IN FEMALE, ESTROGEN RECEPTOR NEGATIVE: Primary | ICD-10-CM

## 2024-07-25 DIAGNOSIS — C50.812 MALIGNANT NEOPLASM OF OVERLAPPING SITES OF LEFT BREAST IN FEMALE, ESTROGEN RECEPTOR NEGATIVE: ICD-10-CM

## 2024-07-25 DIAGNOSIS — C50.812 MALIGNANT NEOPLASM OF OVERLAPPING SITES OF LEFT BREAST IN FEMALE, ESTROGEN RECEPTOR NEGATIVE: Primary | ICD-10-CM

## 2024-07-25 LAB
BILIRUB UR QL STRIP: NEGATIVE
CLARITY UR: CLEAR
COLOR UR: ABNORMAL
GLUCOSE UR STRIP-MCNC: NEGATIVE MG/DL
HGB UR QL STRIP.AUTO: NEGATIVE
KETONES UR QL STRIP: ABNORMAL
LEUKOCYTE ESTERASE UR QL STRIP.AUTO: NEGATIVE
NITRITE UR QL STRIP: NEGATIVE
PH UR STRIP.AUTO: 6.5 [PH] (ref 5–8)
PROT UR QL STRIP: NEGATIVE
SP GR UR STRIP: 1.02 (ref 1–1.03)
UROBILINOGEN UR QL STRIP: ABNORMAL

## 2024-07-25 PROCEDURE — 81003 URINALYSIS AUTO W/O SCOPE: CPT

## 2024-07-25 RX ORDER — CYCLOBENZAPRINE HCL 5 MG
5 TABLET ORAL 3 TIMES DAILY PRN
Qty: 30 TABLET | Refills: 0 | Status: SHIPPED | OUTPATIENT
Start: 2024-07-25

## 2024-07-25 RX ORDER — CYCLOBENZAPRINE HCL 5 MG
5 TABLET ORAL 3 TIMES DAILY PRN
Qty: 30 TABLET | Refills: 0 | Status: SHIPPED | OUTPATIENT
Start: 2024-07-25 | End: 2024-07-25 | Stop reason: SDUPTHER

## 2024-08-16 ENCOUNTER — PRE-ADMISSION TESTING (OUTPATIENT)
Dept: PREADMISSION TESTING | Facility: HOSPITAL | Age: 59
End: 2024-08-16
Payer: COMMERCIAL

## 2024-08-16 VITALS — WEIGHT: 122.36 LBS | HEIGHT: 65 IN | BODY MASS INDEX: 20.39 KG/M2

## 2024-08-16 LAB
DEPRECATED RDW RBC AUTO: 47 FL (ref 37–54)
ERYTHROCYTE [DISTWIDTH] IN BLOOD BY AUTOMATED COUNT: 12.9 % (ref 12.3–15.4)
HCT VFR BLD AUTO: 42.7 % (ref 34–46.6)
HGB BLD-MCNC: 14.4 G/DL (ref 12–15.9)
MCH RBC QN AUTO: 33.2 PG (ref 26.6–33)
MCHC RBC AUTO-ENTMCNC: 33.7 G/DL (ref 31.5–35.7)
MCV RBC AUTO: 98.4 FL (ref 79–97)
PLATELET # BLD AUTO: 164 10*3/MM3 (ref 140–450)
PMV BLD AUTO: 9 FL (ref 6–12)
QT INTERVAL: 352 MS
QTC INTERVAL: 482 MS
RBC # BLD AUTO: 4.34 10*6/MM3 (ref 3.77–5.28)
WBC NRBC COR # BLD AUTO: 5.26 10*3/MM3 (ref 3.4–10.8)

## 2024-08-16 PROCEDURE — 85027 COMPLETE CBC AUTOMATED: CPT

## 2024-08-16 PROCEDURE — 36415 COLL VENOUS BLD VENIPUNCTURE: CPT

## 2024-08-16 PROCEDURE — 93005 ELECTROCARDIOGRAM TRACING: CPT

## 2024-08-16 NOTE — PAT
Patient viewed general PAT education video as instructed in their preoperative information received from their surgeon.  Patient stated the general PAT education video was viewed in its entirety and survey completed.  Copies of [facility] general education handouts (Incentive Spirometry, Meds to Beds Program, Patient Belongings, Pre-op skin preparation instructions, Blood Glucose testing, Visitor policy, Surgery FAQ, Code H) distributed to patient if not printed. Education related to the PAT pass and skin preparation for surgery (if applicable) completed in [facility] as a reinforcement to PAT education video. Patient instructed to return PAT pass provided today as well as completed skin preparation sheet (if applicable) on the day of procedure.     Additionally if patient had not viewed video yet but intended to view it at home or in our waiting area, then referred them to the handout with QR code/link provided during PAT visit.  Instructed patient to complete survey after viewing the video in its entirety.  Encouraged patient/family to read [facility] general education handouts thoroughly and notify PAT staff with any questions or concerns. Patient verbalized understanding of all information and priority content.    An arrival time for procedure was not provided during PAT visit. If patient had any questions or concerns about their arrival time, they were instructed to contact their surgeon/physician.  Additionally, if the patient referred to an arrival time that was acquired from their my chart account, patient was encouraged to verify that time with their surgeon/physician. Arrival times are NOT provided in Pre Admission Testing Department.    Patient denies any current skin issues.     Patient to apply Chlorhexadine wipes  to surgical area (as instructed) the night before procedure and the AM of procedure. Wipes provided.    Post-Surgery Information Instruction Sheet given to patient during Pre-Admission Testing Visit with verbal  instructions to patient to return with PAT PASS on the day of surgery. Additionally, encouraged patient to review the information provided.

## 2024-08-22 ENCOUNTER — ANESTHESIA EVENT (OUTPATIENT)
Dept: PERIOP | Facility: HOSPITAL | Age: 59
End: 2024-08-22
Payer: COMMERCIAL

## 2024-08-22 RX ORDER — FAMOTIDINE 10 MG/ML
20 INJECTION, SOLUTION INTRAVENOUS ONCE
Status: CANCELLED | OUTPATIENT
Start: 2024-08-22 | End: 2024-08-22

## 2024-08-23 ENCOUNTER — ANESTHESIA (OUTPATIENT)
Dept: PERIOP | Facility: HOSPITAL | Age: 59
End: 2024-08-23
Payer: COMMERCIAL

## 2024-08-23 ENCOUNTER — HOSPITAL ENCOUNTER (OUTPATIENT)
Facility: HOSPITAL | Age: 59
Setting detail: HOSPITAL OUTPATIENT SURGERY
Discharge: HOME | End: 2024-08-23
Attending: STUDENT IN AN ORGANIZED HEALTH CARE EDUCATION/TRAINING PROGRAM | Admitting: STUDENT IN AN ORGANIZED HEALTH CARE EDUCATION/TRAINING PROGRAM
Payer: COMMERCIAL

## 2024-08-23 VITALS
DIASTOLIC BLOOD PRESSURE: 75 MMHG | WEIGHT: 121 LBS | HEIGHT: 65 IN | SYSTOLIC BLOOD PRESSURE: 131 MMHG | OXYGEN SATURATION: 98 % | RESPIRATION RATE: 16 BRPM | BODY MASS INDEX: 20.16 KG/M2 | HEART RATE: 72 BPM | TEMPERATURE: 98.1 F

## 2024-08-23 DIAGNOSIS — K76.9 LIVER LESION: ICD-10-CM

## 2024-08-23 PROCEDURE — 25010000002 MIDAZOLAM PER 1 MG: Performed by: STUDENT IN AN ORGANIZED HEALTH CARE EDUCATION/TRAINING PROGRAM

## 2024-08-23 PROCEDURE — 25010000002 FENTANYL CITRATE (PF) 50 MCG/ML SOLUTION

## 2024-08-23 PROCEDURE — 25010000002 CEFAZOLIN PER 500 MG: Performed by: STUDENT IN AN ORGANIZED HEALTH CARE EDUCATION/TRAINING PROGRAM

## 2024-08-23 PROCEDURE — 25010000002 SUGAMMADEX 200 MG/2ML SOLUTION: Performed by: STUDENT IN AN ORGANIZED HEALTH CARE EDUCATION/TRAINING PROGRAM

## 2024-08-23 PROCEDURE — 25810000003 LACTATED RINGERS PER 1000 ML: Performed by: STUDENT IN AN ORGANIZED HEALTH CARE EDUCATION/TRAINING PROGRAM

## 2024-08-23 PROCEDURE — 25010000002 HYDROMORPHONE 1 MG/ML SOLUTION

## 2024-08-23 PROCEDURE — 88305 TISSUE EXAM BY PATHOLOGIST: CPT | Performed by: STUDENT IN AN ORGANIZED HEALTH CARE EDUCATION/TRAINING PROGRAM

## 2024-08-23 PROCEDURE — 25010000002 PROPOFOL 10 MG/ML EMULSION: Performed by: STUDENT IN AN ORGANIZED HEALTH CARE EDUCATION/TRAINING PROGRAM

## 2024-08-23 PROCEDURE — 25010000002 DEXAMETHASONE PER 1 MG: Performed by: STUDENT IN AN ORGANIZED HEALTH CARE EDUCATION/TRAINING PROGRAM

## 2024-08-23 PROCEDURE — 25010000002 FENTANYL CITRATE (PF) 100 MCG/2ML SOLUTION: Performed by: STUDENT IN AN ORGANIZED HEALTH CARE EDUCATION/TRAINING PROGRAM

## 2024-08-23 RX ORDER — ROCURONIUM BROMIDE 10 MG/ML
INJECTION, SOLUTION INTRAVENOUS AS NEEDED
Status: DISCONTINUED | OUTPATIENT
Start: 2024-08-23 | End: 2024-08-23 | Stop reason: SURG

## 2024-08-23 RX ORDER — SODIUM CHLORIDE 0.9 % (FLUSH) 0.9 %
10 SYRINGE (ML) INJECTION EVERY 12 HOURS SCHEDULED
Status: DISCONTINUED | OUTPATIENT
Start: 2024-08-23 | End: 2024-08-23 | Stop reason: HOSPADM

## 2024-08-23 RX ORDER — BUPIVACAINE HYDROCHLORIDE AND EPINEPHRINE 2.5; 5 MG/ML; UG/ML
INJECTION, SOLUTION EPIDURAL; INFILTRATION; INTRACAUDAL; PERINEURAL AS NEEDED
Status: DISCONTINUED | OUTPATIENT
Start: 2024-08-23 | End: 2024-08-23 | Stop reason: HOSPADM

## 2024-08-23 RX ORDER — SODIUM CHLORIDE 0.9 % (FLUSH) 0.9 %
10 SYRINGE (ML) INJECTION AS NEEDED
Status: DISCONTINUED | OUTPATIENT
Start: 2024-08-23 | End: 2024-08-23 | Stop reason: HOSPADM

## 2024-08-23 RX ORDER — FENTANYL CITRATE 50 UG/ML
INJECTION, SOLUTION INTRAMUSCULAR; INTRAVENOUS
Status: COMPLETED
Start: 2024-08-23 | End: 2024-08-23

## 2024-08-23 RX ORDER — FENTANYL CITRATE 50 UG/ML
50 INJECTION, SOLUTION INTRAMUSCULAR; INTRAVENOUS
Status: DISCONTINUED | OUTPATIENT
Start: 2024-08-23 | End: 2024-08-24 | Stop reason: HOSPADM

## 2024-08-23 RX ORDER — PROPOFOL 10 MG/ML
VIAL (ML) INTRAVENOUS AS NEEDED
Status: DISCONTINUED | OUTPATIENT
Start: 2024-08-23 | End: 2024-08-23 | Stop reason: SURG

## 2024-08-23 RX ORDER — MIDAZOLAM HYDROCHLORIDE 1 MG/ML
1 INJECTION INTRAMUSCULAR; INTRAVENOUS
Status: DISCONTINUED | OUTPATIENT
Start: 2024-08-23 | End: 2024-08-23 | Stop reason: HOSPADM

## 2024-08-23 RX ORDER — LIDOCAINE HYDROCHLORIDE 10 MG/ML
INJECTION, SOLUTION EPIDURAL; INFILTRATION; INTRACAUDAL; PERINEURAL AS NEEDED
Status: DISCONTINUED | OUTPATIENT
Start: 2024-08-23 | End: 2024-08-23 | Stop reason: SURG

## 2024-08-23 RX ORDER — DROPERIDOL 2.5 MG/ML
0.62 INJECTION, SOLUTION INTRAMUSCULAR; INTRAVENOUS ONCE AS NEEDED
Status: DISCONTINUED | OUTPATIENT
Start: 2024-08-23 | End: 2024-08-24 | Stop reason: HOSPADM

## 2024-08-23 RX ORDER — MIDAZOLAM HYDROCHLORIDE 1 MG/ML
INJECTION INTRAMUSCULAR; INTRAVENOUS AS NEEDED
Status: DISCONTINUED | OUTPATIENT
Start: 2024-08-23 | End: 2024-08-23 | Stop reason: SURG

## 2024-08-23 RX ORDER — HYDROMORPHONE HYDROCHLORIDE 1 MG/ML
0.5 INJECTION, SOLUTION INTRAMUSCULAR; INTRAVENOUS; SUBCUTANEOUS
Status: DISCONTINUED | OUTPATIENT
Start: 2024-08-23 | End: 2024-08-24 | Stop reason: HOSPADM

## 2024-08-23 RX ORDER — SODIUM CHLORIDE, SODIUM LACTATE, POTASSIUM CHLORIDE, CALCIUM CHLORIDE 600; 310; 30; 20 MG/100ML; MG/100ML; MG/100ML; MG/100ML
9 INJECTION, SOLUTION INTRAVENOUS CONTINUOUS
Status: DISCONTINUED | OUTPATIENT
Start: 2024-08-23 | End: 2024-08-24 | Stop reason: HOSPADM

## 2024-08-23 RX ORDER — TRAMADOL HYDROCHLORIDE 50 MG/1
50 TABLET ORAL EVERY 6 HOURS PRN
Qty: 10 TABLET | Refills: 0 | Status: SHIPPED | OUTPATIENT
Start: 2024-08-23 | End: 2024-08-29

## 2024-08-23 RX ORDER — FAMOTIDINE 20 MG/1
20 TABLET, FILM COATED ORAL ONCE
Status: COMPLETED | OUTPATIENT
Start: 2024-08-23 | End: 2024-08-23

## 2024-08-23 RX ORDER — FENTANYL CITRATE 50 UG/ML
INJECTION, SOLUTION INTRAMUSCULAR; INTRAVENOUS AS NEEDED
Status: DISCONTINUED | OUTPATIENT
Start: 2024-08-23 | End: 2024-08-23 | Stop reason: SURG

## 2024-08-23 RX ORDER — SODIUM CHLORIDE 9 MG/ML
40 INJECTION, SOLUTION INTRAVENOUS AS NEEDED
Status: DISCONTINUED | OUTPATIENT
Start: 2024-08-23 | End: 2024-08-23 | Stop reason: HOSPADM

## 2024-08-23 RX ORDER — DEXAMETHASONE SODIUM PHOSPHATE 4 MG/ML
INJECTION, SOLUTION INTRA-ARTICULAR; INTRALESIONAL; INTRAMUSCULAR; INTRAVENOUS; SOFT TISSUE AS NEEDED
Status: DISCONTINUED | OUTPATIENT
Start: 2024-08-23 | End: 2024-08-23 | Stop reason: SURG

## 2024-08-23 RX ORDER — LIDOCAINE HYDROCHLORIDE 10 MG/ML
0.5 INJECTION, SOLUTION EPIDURAL; INFILTRATION; INTRACAUDAL; PERINEURAL ONCE AS NEEDED
Status: COMPLETED | OUTPATIENT
Start: 2024-08-23 | End: 2024-08-23

## 2024-08-23 RX ORDER — TRAMADOL HYDROCHLORIDE 50 MG/1
50 TABLET ORAL EVERY 6 HOURS PRN
Qty: 10 TABLET | Refills: 0 | Status: SHIPPED | OUTPATIENT
Start: 2024-08-23 | End: 2024-08-23

## 2024-08-23 RX ADMIN — HYDROMORPHONE HYDROCHLORIDE 0.5 MG: 1 INJECTION, SOLUTION INTRAMUSCULAR; INTRAVENOUS; SUBCUTANEOUS at 19:14

## 2024-08-23 RX ADMIN — SODIUM CHLORIDE, POTASSIUM CHLORIDE, SODIUM LACTATE AND CALCIUM CHLORIDE 9 ML/HR: 600; 310; 30; 20 INJECTION, SOLUTION INTRAVENOUS at 11:24

## 2024-08-23 RX ADMIN — FENTANYL CITRATE 50 MCG: 50 INJECTION, SOLUTION INTRAMUSCULAR; INTRAVENOUS at 19:05

## 2024-08-23 RX ADMIN — MIDAZOLAM HYDROCHLORIDE 2 MG: 1 INJECTION, SOLUTION INTRAMUSCULAR; INTRAVENOUS at 17:55

## 2024-08-23 RX ADMIN — HYDROMORPHONE HYDROCHLORIDE 0.5 MG: 1 INJECTION, SOLUTION INTRAMUSCULAR; INTRAVENOUS; SUBCUTANEOUS at 19:30

## 2024-08-23 RX ADMIN — SODIUM CHLORIDE 2000 MG: 900 INJECTION INTRAVENOUS at 18:09

## 2024-08-23 RX ADMIN — ROCURONIUM BROMIDE 50 MG: 10 INJECTION INTRAVENOUS at 18:04

## 2024-08-23 RX ADMIN — LIDOCAINE HYDROCHLORIDE 50 MG: 10 INJECTION, SOLUTION EPIDURAL; INFILTRATION; INTRACAUDAL; PERINEURAL at 18:03

## 2024-08-23 RX ADMIN — LIDOCAINE HYDROCHLORIDE 0.5 ML: 10 INJECTION, SOLUTION EPIDURAL; INFILTRATION; INTRACAUDAL; PERINEURAL at 11:09

## 2024-08-23 RX ADMIN — FAMOTIDINE 20 MG: 20 TABLET, FILM COATED ORAL at 11:09

## 2024-08-23 RX ADMIN — PROPOFOL 150 MG: 10 INJECTION, EMULSION INTRAVENOUS at 18:03

## 2024-08-23 RX ADMIN — SUGAMMADEX 100 MG: 100 INJECTION, SOLUTION INTRAVENOUS at 18:48

## 2024-08-23 RX ADMIN — DEXAMETHASONE SODIUM PHOSPHATE 8 MG: 4 INJECTION INTRA-ARTICULAR; INTRALESIONAL; INTRAMUSCULAR; INTRAVENOUS; SOFT TISSUE at 18:09

## 2024-08-23 RX ADMIN — FENTANYL CITRATE 50 MCG: 50 INJECTION, SOLUTION INTRAMUSCULAR; INTRAVENOUS at 18:13

## 2024-08-23 RX ADMIN — FENTANYL CITRATE 50 MCG: 50 INJECTION, SOLUTION INTRAMUSCULAR; INTRAVENOUS at 18:00

## 2024-08-23 NOTE — INTERVAL H&P NOTE
Pre-Op H&P (See Recent Office Note Attached for Full H&P)    Chief complaint: Liver lesion    HPI:      Patient is a 58 y.o. female who presents with newly found liver lesion.  History significant for left breast cancer (T1N0M0) status post bilateral mastectomy and neoadjuvant chemotherapy.  She underwent a PET/CT in June.  This revealed a hypermetabolic left lobe liver lesion.  Subsequent MRI of the abdomen did not reveal an obvious lesion, however this was limited by motion artifact.  Liver biopsy showed normal liver tissue.  After further discussion with radiation, IR and medical oncology, consensus was that this lesion is concerning for possible mets.  Treatment options were further discussed.  Patient was recommended for diagnostic laparoscopy with liver ultrasound and biopsy.    Review of Systems:  General ROS:  no fever, chills, rashes.  No change since last office visit.  No recent sick exposure  Cardiovascular ROS: no chest pain or dyspnea on exertion  Respiratory ROS: no cough, shortness of breath, or wheezing    Immunization History:   Influenza: Fall 2023  Pneumococcal: N/A  Tetanus: Unknown    Meds:    No current facility-administered medications on file prior to encounter.     Current Outpatient Medications on File Prior to Encounter   Medication Sig Dispense Refill    cyclobenzaprine (FLEXERIL) 5 MG tablet Take 1 tablet by mouth 3 (Three) Times a Day As Needed for Muscle Spasms. 30 tablet 0    multivitamin with minerals (Hair Skin and Nails Formula) tablet tablet Take 1 tablet by mouth Daily.      multivitamin with minerals tablet tablet Take 1 tablet by mouth Daily.      rosuvastatin (CRESTOR) 5 MG tablet Take 1 tablet by mouth Daily.      sertraline (ZOLOFT) 50 MG tablet Take 0.5 tablets by mouth Daily.      vitamin B-12 (CYANOCOBALAMIN) 500 MCG tablet Take 1 tablet by mouth Daily.      cetirizine (zyrTEC) 10 MG tablet Take 1 tablet by mouth Daily As Needed for Allergies or Rhinitis.      Magic  "Mouthwash Oral Suspension (diphenhydrAMINE HCl - aluminum & magnesium hydroxide-simethicone - lidocaine - nystatin) Swish and spit or swallow 5-10 mL 4 (Four) Times a Day As Needed. (Patient taking differently: Swish and spit 5-10 mL 4 (Four) Times a Day As Needed for Mucositis or Stomatitis.) 240 mL 3       Vital Signs:  /90 (BP Location: Right arm, Patient Position: Lying)   Pulse 97   Temp 97.3 °F (36.3 °C) (Temporal)   Resp 16   Ht 165.1 cm (65\")   Wt 54.9 kg (121 lb)   SpO2 100%   BMI 20.14 kg/m²     Physical Exam:    CV:  S1S2 regular rate and rhythm, no murmur               Resp:  Clear to auscultation; respirations regular, even and unlabored    Results Review:     Lab Results   Component Value Date    WBC 5.26 08/16/2024    HGB 14.4 08/16/2024    HCT 42.7 08/16/2024    MCV 98.4 (H) 08/16/2024     08/16/2024    NEUTROABS 2.15 06/21/2024    GLUCOSE 99 05/02/2024    BUN 14 05/02/2024    CREATININE 0.71 05/02/2024     05/02/2024    K 3.9 05/02/2024     05/02/2024    CO2 24.0 05/02/2024    CALCIUM 9.6 05/02/2024    ALBUMIN 4.5 05/02/2024    AST 27 05/02/2024    ALT 20 05/02/2024    BILITOT 0.3 05/02/2024    INR 0.92 06/21/2024        I reviewed the patient's new clinical results.    Cancer Staging (if applicable)  Cancer Patient: _x_ yes __no __unknown; If yes, clinical stage T:_1_ N:_0_M:_0_, stage group or __N/A    Assessment/Plan:  Patient is a pleasant 58-year-old lady with a history of left breast cancer status postmastectomy and neoadjuvant chemotherapy who presents with recently found liver lesion on PET/CT.    -OR today for diagnostic laparoscopy with liver ultrasound and biopsy with Dr. Lopez.    Jed Rivas PA-C   8/23/2024   13:47 EDT         "

## 2024-08-23 NOTE — ANESTHESIA POSTPROCEDURE EVALUATION
Patient: Swathi Cain    Procedure Summary       Date: 08/23/24 Room / Location:  [facility]    Anesthesia Start: 1755 Anesthesia Stop: 1901    Procedure: DIAGNOSTIC LAPAROSCOPY WITH PHACIFORM LIGAMENT BIOPSY (Abdomen) Diagnosis:     Surgeons: Jame Lopez MD Provider: Darryl Altamirano MD    Anesthesia Type: general ASA Status: 2            Anesthesia Type: general    Vitals  No vitals data found for the desired time range.          Post Anesthesia Care and Evaluation    Patient location during evaluation: PACU  Patient participation: complete - patient participated  Level of consciousness: awake and alert  Pain management: adequate    Airway patency: patent  Anesthetic complications: No anesthetic complications  PONV Status: none  Cardiovascular status: hemodynamically stable and acceptable  Respiratory status: nonlabored ventilation, acceptable and nasal cannula  Hydration status: acceptable

## 2024-08-23 NOTE — OP NOTE
OPERATIVE NOTE    Patient Name:  Swathi Cain  YOB: 1965  [id number]     Date of Surgery:  8/23/2024     Pre-op Diagnosis: Liver lesion, Hx of breast cancer    Post-op Diagnosis:   Hx of breast cancer  Falciform ligament nodule      Procedure:   Diagnostic laparoscopy  Biopsy of falciform ligament nodule  Intraoperative liver ultrasound    Surgeon: Jame Lopez MD    Assistant:   None    Anesthesia: General       Estimated Blood Loss: minimal    Complications: None apparent    Implants:    Nothing was implanted during the procedure    Specimen:          Specimens       ID Source Type Tests Collected By Collected At Frozen?    A Liver Tissue TISSUE PATHOLOGY EXAM   Jame Lopez MD 8/23/24 1828 No    Description: PHACIFORM LIGAMENT NODULE    This specimen was not marked as sent.              Findings: No evidence of liver lesion in segment IV of liver. No concerning masses in the remainder of the liver. There was a nodule in the falciform ligament which was removed and sent for permanent pathology. No evidence of peritoneal disease.     Indications:  Patient is a 58 y.o. female who presents with newly found liver lesion.  History significant for left breast cancer (T1N0M0) status post bilateral mastectomy and neoadjuvant chemotherapy.  She underwent a PET/CT in June.  This revealed a hypermetabolic left lobe liver lesion.  Subsequent MRI of the abdomen did not reveal an obvious lesion, however this was limited by motion artifact.  Liver biopsy showed normal liver tissue.  After further discussion with radiation, IR and medical oncology, consensus was that this lesion is concerning for possible mets.  Treatment options were further discussed.  Patient was recommended for diagnostic laparoscopy with liver ultrasound and biopsy.     Description of Procedure:   After informed consent was obtained, patient identification verified, and pre operative checklist completed, the patient was brought to  the Operating Room and placed comfortably in the supine position on the operating table.  The patient was given appropriate antimicrobial prophylaxis.    General anesthesia was administered without complication and the patient was intubated without difficulty. The patient’s abdomen was prepped and draped in the usual sterile fashion. After an appropriate surgical pause and time out was completed, an incision was made at the umbilicus, and a Veress needle was inserted. Position was confirmed with saline drop test and low opening pressure. The abdomen was insufflated to 15 mmHg. A 5 mm port was inserted at the umbilicus using the Optiview technique. The abdomen was inspected and no iatrogenic injury was identified.  Additional ports were placed under direct visualization in the following locations: 5 mm in LUQ and a 5 mm in the RUQ. The umbilical port was upsized to a 12 mm port. The abdominal cavity was inspected and there was no obvious peritoneal disease. The only abnormal finding was a small nodule with spiculations along the falciform ligament. The falciform was taken down with an energy device and this nodule was harvested for permanent pathology. The  liver ultrasound was then used to survey the liver. The patient had normal vascular anatomy. There were no obvious parenchymal masses or nodules on the surface or on IOUS in the area of the abnormality on PET/CT. There was no identifiable mass on the surface or bulge indicating an underlying lesion in this area.There was no evidence of other masses elsewhere in the liver. Due to this, no further biopsies were taken. Hemostasis was confirmed. The ports were removed under direct visualization and abdomen desufflated. The umbilical port site was closed with a 0 Vicryl. Skin was closed with 4-0 Monocryl and dressed with surgical glue.       All sponge and needle counts were correct times two at the completion of the procedure. The patient recovered from anesthesia,  was extubated in the operating room and was transported to the PACU in stable condition.      Jame Lopez MD     Date: 8/23/2024  Time: 18:57 EDT

## 2024-08-23 NOTE — ANESTHESIA PREPROCEDURE EVALUATION
Anesthesia Evaluation     Patient summary reviewed and Nursing notes reviewed   no history of anesthetic complications:   NPO Solid Status: > 8 hours  NPO Liquid Status: > 2 hours           Airway   Mallampati: II  TM distance: >3 FB  Neck ROM: full  No difficulty expected  Dental - normal exam     Pulmonary - negative pulmonary ROS and normal exam    breath sounds clear to auscultation  Cardiovascular - normal exam  Exercise tolerance: good (4-7 METS)    ECG reviewed  Rhythm: regular  Rate: normal    (+) hyperlipidemia    ROS comment: EKG 8/16/24:    Sinus tachycardia  Possible Left atrial enlargement  Rightward axis  Borderline ECG  No previous ECGs available    ECHO 11/2023: EF 60-65%, trace to mild TR, mildly elevated RVSP 35-45 mmHg        Neuro/Psych  (+) psychiatric history Anxiety  GI/Hepatic/Renal/Endo - negative ROS     Musculoskeletal (-) negative ROS    Abdominal    Substance History - negative use     OB/GYN          Other      history of cancer (Breast cancer s/p bilateral mastectomy 5/14/24 and chemotherapy)    ROS/Med Hx Other: Hgb 14.4  Plt 164    5/14/24: DL MAC 3, 7.0 ETT, G1V, x1 attempt                Anesthesia Plan    ASA 2     general     intravenous induction     Anesthetic plan, risks, benefits, and alternatives have been provided, discussed and informed consent has been obtained with: patient.    Plan discussed with CRNA.    CODE STATUS:

## 2024-08-23 NOTE — ANESTHESIA PROCEDURE NOTES
Airway  Urgency: elective    Date/Time: 8/23/2024 6:06 PM  Airway not difficult    General Information and Staff    Patient location during procedure: OR  Anesthesiologist: Darryl Altamirano MD    Indications and Patient Condition  Indications for airway management: airway protection    Preoxygenated: yes  MILS not maintained throughout  Mask difficulty assessment: 1 - vent by mask    Final Airway Details  Final airway type: endotracheal airway      Successful airway: ETT  Cuffed: yes   Successful intubation technique: video laryngoscopy  Endotracheal tube insertion site: oral  Blade: Grimes  Blade size: 3  ETT size (mm): 7.0  Cormack-Lehane Classification: grade I - full view of glottis  Placement verified by: chest auscultation and capnometry   Measured from: lips  ETT/EBT  to lips (cm): 21  Number of attempts at approach: 1  Assessment: lips, teeth, and gum same as pre-op and atraumatic intubation    Additional Comments  Negative epigastric sounds, Breath sound equal bilaterally with symmetric chest rise and fall         Patient/Caregiver provided printed discharge information.

## 2024-08-27 LAB
CYTO UR: NORMAL
LAB AP CASE REPORT: NORMAL
LAB AP CLINICAL INFORMATION: NORMAL
PATH REPORT.FINAL DX SPEC: NORMAL
PATH REPORT.GROSS SPEC: NORMAL

## 2024-08-29 ENCOUNTER — HOSPITAL ENCOUNTER (OUTPATIENT)
Dept: ONCOLOGY | Facility: HOSPITAL | Age: 59
Discharge: HOME | End: 2024-08-29
Admitting: INTERNAL MEDICINE
Payer: COMMERCIAL

## 2024-08-29 ENCOUNTER — RESEARCH ENCOUNTER (OUTPATIENT)
Dept: OTHER | Facility: OTHER | Age: 59
End: 2024-08-29
Payer: COMMERCIAL

## 2024-08-29 ENCOUNTER — OFFICE VISIT (OUTPATIENT)
Dept: ONCOLOGY | Facility: CLINIC | Age: 59
End: 2024-08-29
Payer: COMMERCIAL

## 2024-08-29 VITALS
SYSTOLIC BLOOD PRESSURE: 157 MMHG | OXYGEN SATURATION: 98 % | BODY MASS INDEX: 19.99 KG/M2 | HEIGHT: 65 IN | WEIGHT: 120 LBS | RESPIRATION RATE: 16 BRPM | DIASTOLIC BLOOD PRESSURE: 94 MMHG

## 2024-08-29 DIAGNOSIS — C50.812 MALIGNANT NEOPLASM OF OVERLAPPING SITES OF LEFT BREAST IN FEMALE, ESTROGEN RECEPTOR NEGATIVE: Primary | ICD-10-CM

## 2024-08-29 DIAGNOSIS — Z17.1 MALIGNANT NEOPLASM OF OVERLAPPING SITES OF LEFT BREAST IN FEMALE, ESTROGEN RECEPTOR NEGATIVE: Primary | ICD-10-CM

## 2024-08-29 PROCEDURE — 96523 IRRIG DRUG DELIVERY DEVICE: CPT

## 2024-08-29 PROCEDURE — 25010000002 HEPARIN LOCK FLUSH PER 10 UNITS: Performed by: INTERNAL MEDICINE

## 2024-08-29 RX ORDER — HEPARIN SODIUM (PORCINE) LOCK FLUSH IV SOLN 100 UNIT/ML 100 UNIT/ML
500 SOLUTION INTRAVENOUS AS NEEDED
OUTPATIENT
Start: 2024-08-29

## 2024-08-29 RX ORDER — SODIUM CHLORIDE 0.9 % (FLUSH) 0.9 %
10 SYRINGE (ML) INJECTION AS NEEDED
OUTPATIENT
Start: 2024-08-29

## 2024-08-29 RX ORDER — HEPARIN SODIUM (PORCINE) LOCK FLUSH IV SOLN 100 UNIT/ML 100 UNIT/ML
500 SOLUTION INTRAVENOUS AS NEEDED
Status: DISCONTINUED | OUTPATIENT
Start: 2024-08-29 | End: 2024-08-31 | Stop reason: HOSPADM

## 2024-08-29 RX ADMIN — HEPARIN 500 UNITS: 100 SYRINGE at 15:45

## 2024-08-29 NOTE — RESEARCH
Patient Name:  Swathi Cain  YOB: 1965  Patient Age:  58 y.o.  Patient's Sex:  female    Date of Service:  08/29/2024    Provider:  Dr. Arreola                     RESEARCH NOTE                  Patient was approached about participation in our Tropion 03 clinical trial. She ultimately declined participation on 06/01/2024.     Steven GONZALEZ

## 2024-08-29 NOTE — PROGRESS NOTES
"      PROBLEM LIST:  [id number] triple negative (her2 0+) invasive ductal carcinoma of the left breast  Biopsy of a 1.2 cm left breast mass on 10/4/23.  Pathology showed a high grade IDC.  Neoadjuvant chemotherapy with ddAC followed by taxol started 11/9/23  Bilateral mastectomy on 5/14/24.  Pathology showed a grade 2 IDC measuring 1.1 cm.  0/2 SLN involved.  There was evidence of of response in the invasive carcinoma, and possibly in the lymph nodes.  [id number]  PET/CT 6/11/24 showed a hypermetabolic left lobe liver lesion, several small foci of uptake in normal appearing mediastinal and hilar LN, subtle uptake left C2 pedicle.  Subsequent bone scan 6/19/24 showed no suggestion of bone metastases.  Liver biopsy 6/21/24 showed normal hepatic tissue.  Repeat imaging 7/18/24 showed persistent uptake in the liver.  Laparoscopic surgery 8/23/24 showed no visible abnormality in the liver.  Falciform ligament nodule was biopsies which was benign.    Subjective     CHIEF COMPLAINT: breast cancer    HISTORY OF PRESENT ILLNESS:   Swathi Cain returns for follow-up.   She says she is feeling okay.  She is a little sore from the surgery.        Objective      /94   Resp 16   Ht 165.1 cm (65\")   Wt 54.4 kg (120 lb)   SpO2 98%   BMI 19.97 kg/m²   Vitals:    08/29/24 1501   PainSc: 0-No pain                       ECOG score: 0             General: well appearing female in no acute distress  Neuro: alert and oriented  HEENT: sclera anicteric  Extremeties: no lower extremity edema  Skin: no rashes, lesions, bruising, or petechiae  Psych: mood and affect appropriate            RECENT LABS:  Lab Results   Component Value Date    WBC 5.26 08/16/2024    HGB 14.4 08/16/2024    HCT 42.7 08/16/2024    MCV 98.4 (H) 08/16/2024     08/16/2024       Lab Results   Component Value Date    GLUCOSE 99 05/02/2024    BUN 14 05/02/2024    CREATININE 0.71 05/02/2024    BCR 19.7 05/02/2024    K 3.9 05/02/2024    CO2 24.0 " 05/02/2024    CALCIUM 9.6 05/02/2024    ALBUMIN 4.5 05/02/2024    AST 27 05/02/2024    ALT 20 05/02/2024               ASSESSMENT AND PLAN:     Swathi Cain is a 58 y.o. female with a T1 cN0 M0 triple negative invasive ductal carcinoma of the left breast.  PDL1 CPS < 1 on original resection.    Laparoscopic surgery to evaluate her liver showed no abnormalities.  Biopsy of a falciform ligament nodule was benign.  We will move forward with adjuvant treatment with Xeloda.   We reviewed the potential side effects of capecitabine including myelosuppression, nausea, diarrhea, hand-foot syndrome, coronary vasospasm, and mucositis.    I will plan to continue monitoring with imaging given the questionable findings on her previous scans.  We will do CT and bone scan in about 3 months.    We will hope to get started with Xeloda within the next week.  We will see her back with cycle 2.  Plan for a total of 8 cycles.    Total time of patient care on day of service including time prior to, face to face with patient, and following visit spent in reviewing records, path results, imaging findings, discussion with patient, discussion with other providers, and documentation/charting was > 44 minutes.                      Marga Arreola MD  [facility]    8/29/2024          CC:

## 2024-08-30 ENCOUNTER — SPECIALTY PHARMACY (OUTPATIENT)
Dept: ONCOLOGY | Facility: HOSPITAL | Age: 59
End: 2024-08-30
Payer: COMMERCIAL

## 2024-08-30 DIAGNOSIS — C50.812 MALIGNANT NEOPLASM OF OVERLAPPING SITES OF LEFT BREAST IN FEMALE, ESTROGEN RECEPTOR NEGATIVE: Primary | ICD-10-CM

## 2024-08-30 DIAGNOSIS — Z17.1 MALIGNANT NEOPLASM OF OVERLAPPING SITES OF LEFT BREAST IN FEMALE, ESTROGEN RECEPTOR NEGATIVE: Primary | ICD-10-CM

## 2024-08-30 RX ORDER — CAPECITABINE 500 MG/1
1500 TABLET, FILM COATED ORAL 2 TIMES DAILY
Qty: 84 TABLET | Refills: 7 | Status: SHIPPED | OUTPATIENT
Start: 2024-09-03 | End: 2024-08-30 | Stop reason: SDUPTHER

## 2024-08-30 RX ORDER — CAPECITABINE 500 MG/1
1500 TABLET, FILM COATED ORAL 2 TIMES DAILY
Qty: 84 TABLET | Refills: 7 | Status: SHIPPED | OUTPATIENT
Start: 2024-09-03

## 2024-08-30 NOTE — PROGRESS NOTES
Oral Chemotherapy - New Referral    Received a referral from Dr. Arreola    Treatment Plan: Xeloda (capecitabine)    Start date of treatment planned for: As soon as oral specialty medication is available. Dr. Arreola would like for the patient to start capecitabine next week if possible.    Indication: Invasive ductal carcinoma of the left breast, triple negative (Her2 0+), [id number]    Relevant past treatments: In November 2023, the patient started neoadjuvant chemotherapy with dose-dense doxorubicin and cyclophosphamide followed by paclitaxel. On 5/14/24, the patient underwent a bilateral mastectomy. The patient had a hypermetabolic left lobe liver lesion on a PET/CT scan on 6/11/24. The patient had a liver biopsy on 6/21/24 that revealed normal hepatic tissue. In July 2024, the patient had persistent uptake in the liver on repeat imaging. On 8/23/24, the patient underwent laparoscopic surgery with no visible abnormality of the liver. Dr. Arreola recommends adjuvant capecitabine for 8 cycles total.     Is the therapy appropriate based on treatment guidelines and FDA labeling?: Yes    Therapeutic Goals:  Planning for 8 cycles total of adjuvant capecitabine.    Patient can self-administer oral medications: Yes    Drug-Drug Interactions: The current medication list was reviewed and there are some relevant drug-drug interactions with the oral specialty medication that will be discussed during education, including:  There is an increased risk of QT prolongation with capecitabine and ondansetron. Will  the patient to monitor for dizziness, feeling lightheaded, or heart palpitations during education.  There is an increased risk of serotonin syndrome with ondansetron and sertraline. Will  the patient to monitor for hyperthermia, diaphoresis, tremor, or clonus during education.    Medication Allergies: The patient has NKDA    Review of Labs/Dose Adjustments: The patient's most recent labs were reviewed and all  are WNL to start treatment at this dose. The patient needs updated labs prior to starting treatment.  Calculated CrCl ~76 mL/min (using labs from 5/2/24).    Dose calculation: capecitabine 1,000 mg/m2 per dose x 1.59 m2 = 1,590 mg rounded down to 1,500 mg per dose per Dr. Arreola    The patient is unable to fill capecitabine at [facility] due to no payor access. The patient's insurance requires her to fill capecitabine at [facility] instead.    A prescription was released to [facility] for   Drug: Xeloda (capecitabine)  Strength: 500 mg  Directions: Take 3 tablets by mouth twice a day with food on Days 1-14, then off for 7 days in a 21-day cycle.  Quantity: 84 tablets  Refills: 7    Pharmacy education is scheduled for 9/4/24., CCA and consent will be signed at that time.    Greta Brito, PharmD  Clinic Oncology Pharmacy Specialist  [phone]    8/30/2024  07:59 EDT

## 2024-08-30 NOTE — PROGRESS NOTES
I completed an independent review of the medication order and prescription. I agree with Dr. Brito' assessment and what is in her note.  I confirm she sent the capecitabine prescription to [facility] specialty pharmacy as described.    capecitabine (XELODA) 500 MG chemo tablet   Dose: 1,500 mg Route: Oral Frequency: 2 Times Daily   Dispense Quantity: 84 tablet Refills: 7          Sig: Take 3 tablets by mouth 2 (Two) Times a Day. With food on Days 1-14, then off for 7 days in a 21-day cycle.     Pharmacy    [facility] - [place], [place] - [address] - [phone]  - [phone]   [address], [facility] [address]           Fartun Rangel, PharmD, BCOP - Oncology Clinical Pharmacist [phone]    8/30/2024  09:04 EDT

## 2024-09-04 ENCOUNTER — TRANSCRIBE ORDERS (OUTPATIENT)
Dept: PHYSICAL THERAPY | Facility: HOSPITAL | Age: 59
End: 2024-09-04
Payer: COMMERCIAL

## 2024-09-04 ENCOUNTER — HOSPITAL ENCOUNTER (OUTPATIENT)
Dept: ONCOLOGY | Facility: HOSPITAL | Age: 59
Discharge: HOME | End: 2024-09-04
Payer: COMMERCIAL

## 2024-09-04 ENCOUNTER — SPECIALTY PHARMACY (OUTPATIENT)
Dept: ONCOLOGY | Facility: HOSPITAL | Age: 59
End: 2024-09-04
Payer: COMMERCIAL

## 2024-09-04 ENCOUNTER — HOSPITAL ENCOUNTER (OUTPATIENT)
Dept: PHYSICAL THERAPY | Facility: HOSPITAL | Age: 59
Setting detail: THERAPIES SERIES
Discharge: HOME | End: 2024-09-04
Payer: COMMERCIAL

## 2024-09-04 DIAGNOSIS — C50.412 MALIGNANT NEOPLASM OF UPPER-OUTER QUADRANT OF LEFT FEMALE BREAST, UNSPECIFIED ESTROGEN RECEPTOR STATUS: Primary | ICD-10-CM

## 2024-09-04 DIAGNOSIS — T45.1X5A CINV (CHEMOTHERAPY-INDUCED NAUSEA AND VOMITING): ICD-10-CM

## 2024-09-04 DIAGNOSIS — C50.812 MALIGNANT NEOPLASM OF OVERLAPPING SITES OF LEFT BREAST IN FEMALE, ESTROGEN RECEPTOR NEGATIVE: Primary | ICD-10-CM

## 2024-09-04 DIAGNOSIS — Z17.1 MALIGNANT NEOPLASM OF OVERLAPPING SITES OF LEFT BREAST IN FEMALE, ESTROGEN RECEPTOR NEGATIVE: Primary | ICD-10-CM

## 2024-09-04 DIAGNOSIS — R11.2 CINV (CHEMOTHERAPY-INDUCED NAUSEA AND VOMITING): ICD-10-CM

## 2024-09-04 PROCEDURE — 97162 PT EVAL MOD COMPLEX 30 MIN: CPT

## 2024-09-04 PROCEDURE — 93702 BIS XTRACELL FLUID ANALYSIS: CPT

## 2024-09-04 RX ORDER — ONDANSETRON 8 MG/1
8 TABLET, FILM COATED ORAL EVERY 8 HOURS PRN
Qty: 30 TABLET | Refills: 3 | Status: SHIPPED | OUTPATIENT
Start: 2024-09-04

## 2024-09-04 NOTE — THERAPY DISCHARGE NOTE
Outpatient Physical Therapy Lymphedema Initial Evaluation & Discharge  [facility]     Patient Name: Swathi Cain  : 1965  MRN: [id number]  Today's Date: 2024      Visit Date: 2024    Visit Dx:    ICD-10-CM ICD-9-CM   1. Malignant neoplasm of upper-outer quadrant of left female breast, unspecified estrogen receptor status  C50.412 174.4       Patient Active Problem List   Diagnosis    Malignant neoplasm of overlapping sites of left breast in female, estrogen receptor negative    Malignant neoplasm of upper-outer quadrant of left female breast        Past Medical History:   Diagnosis Date    Anxiety     Cancer     BREAST CANCER    Hyperlipidemia     Wears glasses     READERS        Past Surgical History:   Procedure Laterality Date    COLONOSCOPY      DIAGNOSTIC LAPAROSCOPY N/A 2024    Procedure: DIAGNOSTIC LAPAROSCOPY WITH PHACIFORM LIGAMENT BIOPSY;  Surgeon: Jame Lopez MD;  Location: [facility];  Service: General;  Laterality: N/A;    MASTECTOMY W/ SENTINEL NODE BIOPSY Bilateral 2024    Procedure: BREAST MASTECTOMY WITH SENTINEL NODE BIOPSY LEFT, RIGH PROPHYLACTIC MASTECTOMY;  Surgeon: Kiara Lopez MD;  Location: [facility];  Service: General;  Laterality: Bilateral;    PORTACATH PLACEMENT Right     WISDOM TOOTH EXTRACTION              Lymphedema       Row Name 24 1145             Subjective Pain    Able to rate subjective pain? yes  -CA      Pre-Treatment Pain Level 0  -CA      Post-Treatment Pain Level 0  -CA         Subjective    Subjective Comments Pt returns for a 3 month post surgical follow up per [facility] breast CA protocol. Pt underwent neoadjuvant chemo of ddAC followed by a B mastectomy with L SLNB. She has just completed a biopsy of a liver lesion and is awaiting her test results. She notes her fatigue is well controlled and she continues to walk regularly for exercise. She is working on resistance training and has questions about the appropriate  activities.  -CA         Lymphedema Assessment    Lymphedema Classification LUE:;at risk/stage 0  -CA      Lymphedema Cancer Related Sx bilateral;simple mastectomy;left;sentinel node biopsy  -CA      Lymph Nodes Removed # 2  -CA      Positive Lymph Nodes # 0  -CA      Chemo Received yes  -CA      Chemo Treatments #/Timeframe ddAC  -CA      Radiation Therapy Received no  -CA         Posture/Observations    Posture- WNL Posture is WNL  -CA         General ROM    RT Upper Ext Rt Shoulder ABduction;Rt Shoulder Flexion;Rt Shoulder External Rotation;Rt Shoulder Internal Rotation  -CA      LT Upper Ext Lt Shoulder ABduction;Lt Shoulder Flexion;Lt Shoulder External Rotation;Lt Shoulder Internal Rotation  -CA         Right Upper Ext    Rt Shoulder Abduction AROM WFL  -CA      Rt Shoulder Flexion AROM WFL  -CA      Rt Shoulder External Rotation AROM WFL  -CA      Rt Shoulder Internal Rotation AROM WFL  -CA         Left Upper Ext    Lt Shoulder Abduction AROM WFL  -CA      Lt Shoulder Flexion AROM WFL  -CA      Lt Shoulder External Rotation AROM WFL  -CA      Lt Shoulder Internal Rotation AROM WFL  -CA         MMT (Manual Muscle Testing)    Rt Upper Ext Rt Shoulder Flexion;Rt Shoulder ABduction;Rt Shoulder Internal Rotation;Rt Shoulder External Rotation  -CA      Lt Upper Ext Lt Shoulder Flexion;Lt Shoulder ABduction;Lt Shoulder Internal Rotation;Lt Shoulder External Rotation  -CA         MMT Right Upper Ext    Rt Shoulder Flexion MMT, Gross Movement (4/5) good  -CA      Rt Shoulder ABduction MMT, Gross Movement (4/5) good  -CA      Rt Shoulder Internal Rotation MMT, Gross Movement (4/5) good  -CA      Rt Shoulder External Rotation MMT, Gross Movement (4/5) good  -CA         MMT Left Upper Ext    Lt Shoulder Flexion MMT, Gross Movement (4/5) good  -CA      Lt Shoulder ABduction MMT, Gross Movement (4/5) good  -CA      Lt Shoulder Internal Rotation MMT, Gross Movement (4/5) good  -CA      Lt Shoulder External Rotation MMT,  Gross Movement (4/5) good  -CA         Hand  Strength     Strength Affected Side Bilateral  -CA          Strength Right    Right  Test 1 60  -CA       Strength Average Right 60  -CA          Strength Left    Left  Test 1 52  -CA       Strength Average Left 52  -CA         Lymphedema Edema Assessment    Edema Assessment Comment No edema present at this time.  -CA         Lymphedema Sensation    Lymphedema Sensation Reports RUE:;LUE:  -CA      Lymphedema Sensation Tests light touch  -CA      Lymphedema Light Touch WNL  -CA         L-Dex Bioimpedence Screening    L-Dex Measurement Extremity LUE  -CA      L-Dex Patient Position Standing  -CA      L-Dex UE Dominate Side Left  -CA      L-Dex UE At Risk Side Right  -CA      L-Dex UE Pre Surgical Value Yes  -CA      L-Dex UE Score -3.4  -CA      L-Dex UE Baseline Score -2  -CA      L-Dex UE Value Change -1.4  -CA      L-Dex UE Comment The patient had SOZO measurement which I reviewed today. The score is in normal limits, see scanned to EMR. Bioimpedance spectroscopy helps identify the onset of lymphedema in an arm or leg before patients experience noticeable swelling. Research has shown that the early detection of lymphedema using L-Dex combined with treatment can reduce progression to chronic lymphedema by 95% in breast cancer patients. Whenever possible, patients are tested for baseline L-Dex score before cancer treatment begins and then are reassessed during regular follow-up visits using the SOZO device. If the patient’s L-Dex score increases above normal levels, that is a sign that lymphedema is developing and a referral is made to occupational or physical therapy for further evaluation and early compression treatment. Lymphedema assessment with the SOZO L-Dex score is recommended to be done every 3 months for the first 3 years and then every 6 months for years 4 and 5 followed by annually afterwards. This score is for 3 months after  this patient’s surgical intervention, a high-risk period of time for potential lymphedema development.  -CA      Skeletal Muscle Mass (%) 30 %  -CA      Fat Mass (%) 29.5 %  -CA      Hy-dex -5.3  -CA                User Key  (r) = Recorded By, (t) = Taken By, (c) = Cosigned By      Initials Name Provider Type    CA Andra Paul PT Physical Therapist                    Therapy Education  Education Details: Pt was educated on the results of this assessment compared to the initial evaluation as well as changes that present. Reinforced continued monitoring for edema. Advised on the need for long term strengthening and cardiovascular exercise during and after oncology care. Provided HEP of the previous ther ex and reviewed with pt.  Given: Symptoms/condition management, Edema management  Program: New  How Provided: Verbal, Demonstration, Written  Provided to: Patient (and spouse)  Level of Understanding: Verbalized     OP Exercises       Row Name 09/04/24 7238             Subjective    Subjective Comments Pt returns for a 3 month post surgical follow up per [facility] breast CA protocol. Pt underwent neoadjuvant chemo of ddAC followed by a B mastectomy with L SLNB. She has just completed a biopsy of a liver lesion and is awaiting her test results. She notes her fatigue is well controlled and she continues to walk regularly for exercise. She is working on resistance training and has questions about the appropriate activities.  -CA         Subjective Pain    Able to rate subjective pain? yes  -CA      Pre-Treatment Pain Level 0  -CA      Post-Treatment Pain Level 0  -CA         Exercise 1    Exercise Name 1 bicep curl  -CA      Sets 1 2  -CA      Reps 1 15  -CA      Additional Comments 3x/week  -CA         Exercise 2    Exercise Name 2 overhead tricep extension  -CA      Sets 2 2  -CA      Reps 2 15  -CA      Additional Comments 3x/week  -CA         Exercise 3    Exercise Name 3 overhead press  -CA      Sets 3 2  -CA       Reps 3 15  -CA      Additional Comments 3x/week  -CA         Exercise 4    Exercise Name 4 shoulder flexion with free weight  -CA      Sets 4 2  -CA      Reps 4 15  -CA      Additional Comments 3x/week  -CA         Exercise 5    Exercise Name 5 shoulder abduction with free weight  -CA      Sets 5 2  -CA      Reps 5 15  -CA      Additional Comments 3x/week  -CA         Exercise 6    Exercise Name 6 single row with free weight  -CA      Sets 6 2  -CA      Reps 6 15  -CA      Additional Comments 3x/week  -CA         Exercise 7    Exercise Name 7 fly with free weight  -CA      Sets 7 2  -CA      Reps 7 15  -CA      Additional Comments 3x/week  -CA         Exercise 8    Exercise Name 8 seated butterfly  -CA      Reps 8 4  -CA      Time 8 15 seconds  -CA      Additional Comments 3x/week  -CA         Exercise 9    Exercise Name 9 lat stretch  -CA      Reps 9 4  -CA      Time 9 15 seconds  -CA      Additional Comments 3x/week  -CA         Exercise 10    Exercise Name 10 doorway pec stretch  -CA      Reps 10 4  -CA      Time 10 15 seconds  -CA      Additional Comments 3x/week  -CA                User Key  (r) = Recorded By, (t) = Taken By, (c) = Cosigned By      Initials Name Provider Type    Andra Lacey, PT Physical Therapist                     PT OP Goals       Row Name 09/04/24 1145          Long Term Goals    LTG Date to Achieve 09/04/24  -CA     LTG 1 Pt to demonstrate an awareness of long term management of UE function for post-operative care related to breast cancer.  -CA     LTG 1 Progress Met  -CA     LTG 2 Pt to demonstrate understanding of long term edema monitoring to respond appropriately if symptoms develop.  -CA     LTG 2 Progress Met  -CA        Time Calculation    PT Goal Re-Cert Due Date 09/04/24  -CA               User Key  (r) = Recorded By, (t) = Taken By, (c) = Cosigned By      Initials Name Provider Type    Andra Lacey, PT Physical Therapist                     PT Assessment/Plan        Row Name 09/04/24 1145          PT Assessment    Assessment Comments This patient presents to PT for assessment after her 3rd month status post BrCA surgical intervention. Post-operative ROM, postural, functional, and bioimpedance measurements were taken today to be compared to measurements at baseline before surgery. Any deficits related to these areas were addressed today with focus on early intervention to avoid post-surgical complications. Personal risk factors for lymphedema post-operatively for the L upper extremity and trunk quadrant and lymphedema risk reduction guidelines were reviewed today. Currently, this patient is able to self-manage with the information provided today. However, she may return for additional management if her functional status changes. Routine assessments for functional status is being performed to refer to the appropriate therapy if concerns present.  -CA     Please refer to paper survey for additional self-reported information Yes  -CA     Rehab Potential Good  -CA     Patient/caregiver participated in establishment of treatment plan and goals Yes  -CA     Patient would benefit from skilled therapy intervention Yes  -CA        PT Plan    PT Frequency One time visit  -CA     Planned CPT's? PT EVAL MOD COMPLELITY: 76833;PT THER PROC EA 15 MIN: 79384;PT THER ACT EA 15 MIN: 72872;PT MANUAL THERAPY EA 15 MIN: 78799;PT SELF CARE/HOME MGMT/TRAIN EA 15: 99674;PT BIS XTRACELL FLUID ANALYSIS: 63271  -CA     PT Plan Comments This patient may return to PT in 3 months for re-evaluation measurements (6 months post operatively) to be compared to measurements taken today and at her presurgical baseline. In addition, she can be examined for possible post-BrCA surgery sequelae such as axillary web syndrome, scar adhesions, edema, worsened posture, scapular winging, pain, and reduced ROM and function. At that time, a future plan and goals will be established and skilled care continued if  indicated. Currently, this POC has been provided with a focus to facilitate recovery and reduce the risk of post-operative sequelae.  -CA               User Key  (r) = Recorded By, (t) = Taken By, (c) = Cosigned By      Initials Name Provider Type    Andra Lacey PT Physical Therapist                     Outcome Measure Options: Quick DASH, Timed Up and Go (TUG)  Quick DASH  Open a tight or new jar.: Mild Difficulty  Do heavy household chores (e.g., wash walls, wash floors): No Difficulty  Carry a shopping bag or briefcase: No Difficulty  Wash your back: No Difficulty  Use a knife to cut food: No Difficulty  Recreational activities in which you take some force or impact through your arm, should or hand (e.g. golf, hammering, tennis, etc.): No Difficulty  During the past week, to what extent has your arm, shoulder, or hand problem interfered with your normal social activites with family, friends, neighbors or groups?: Not at all  During the past week, were you limited in your work or other regular daily activities as a result of your arm, shoulder or hand problem?: Not limited at all  Arm, Shoulder, or hand pain: None  Tingling (pins and needles) in your arm, shoulder, or hand: None  During the past week, how much difficulty have you had sleeping because of the pain in your arm, shoulder or hand?: No difficulty  Number of Questions Answered: 11  Quick DASH Score: 2.27  Timed Up and Go (TUG)  TUG Test 1: 9.68 seconds  Timed Up and Go Comments: no deviations      Time Calculation:   Start Time: 1145  Stop Time: 1218  Time Calculation (min): 33 min  Untimed Charges  PT Eval/Re-eval Minutes: 33  Total Minutes  Untimed Charges Total Minutes: 33   Total Minutes: 33   Time calculation does not account for 15 minutes documentation time    Therapy Charges for Today       Code Description Service Date Service Provider Modifiers Qty    [id number] HC PT EVAL MOD COMPLEXITY 3 9/4/2024 Andra Paul, JESÚS GP 1     [id number]  PT BIS XTRACELL FLUID ANALYSIS 9/4/2024 Andra Paul, PT  1            PT G-Codes  Outcome Measure Options: Quick DASH, Timed Up and Go (TUG)  Quick DASH Score: 2.27  TUG Test 1: 9.68 seconds            Andra Paul, PT  9/4/2024

## 2024-09-04 NOTE — PROGRESS NOTES
Specialty Pharmacy Patient Management Program  Oncology Initial Assessment       Swathi Cain is a 59 y.o. female with invasive ductal carcinoma of left breast, triple negative (Her2 0+) seen by an Oncology provider and enrolled in the Oncology Patient Management program offered by [facility].  An initial outreach was conducted, including assessment of therapy appropriateness and specialty medication education for Xeloda (capecitabine). The patient was introduced to services offered by [facility], including: regular assessments, refill coordination, curbside pick-up or mail order delivery options, prior authorization maintenance, and financial assistance programs as applicable. The patient was also provided with contact information for the pharmacy team.     Regimen: Xeloda (capecitabine) 500 mg tablet - Take 3 tablets by mouth twice daily with food on Days 1-14, then off for 7 days in a 21-day cycle.    Start date of oral specialty medication: As soon as oral specialty medication is available. The patient has not received capecitabine from [facility] yet. We provided the patient with contact information for [facility] today on 9/4/24 in order to call and set up delivery of capecitabine. The patient reports she plans to have her baseline labs drawn on Friday on 9/6/24 and plans to start taking capecitabine on Sunday on 9/8/24.     Relevant Past Medical History, Comorbidities, and Vaccines  Relevant medical history and concomitant health conditions were discussed with the patient. The patient's chart has been reviewed for relevant past medical history and comorbid health conditions and updated as necessary.  Vaccines are coordinated by the patient's oncologist and primary care provider.  Past Medical History:   Diagnosis Date    Anxiety     Cancer     BREAST CANCER    Hyperlipidemia     Wears glasses     READERS     Social History     Socioeconomic History    Marital  status:    Tobacco Use    Smoking status: Never    Smokeless tobacco: Never   Vaping Use    Vaping status: Never Used   Substance and Sexual Activity    Alcohol use: Not Currently    Drug use: Never    Sexual activity: Defer     Allergies  Known allergies and reactions were discussed with the patient. The patient's chart has been reviewed for allergy information and updated as necessary.   No Known Allergies     Current Medication List  This medication list has been reviewed with the patient and evaluated for any interactions or necessary modifications/recommendations, and updated to include all prescription medications, OTC medications, and supplements the patient is currently taking.  This list reflects what is contained in the patient's profile, which has also been marked as reviewed to communicate to other providers it is the most up to date version of the patient's current medication therapy.   Prior to Admission medications    Medication Sig Start Date End Date Taking? Authorizing Provider   capecitabine (XELODA) 500 MG chemo tablet Take 3 tablets by mouth 2 (Two) Times a Day. With food on Days 1-14, then off for 7 days in a 21-day cycle. 9/3/24   Marga Arreola MD   cetirizine (zyrTEC) 10 MG tablet Take 1 tablet by mouth Daily As Needed for Allergies or Rhinitis.    Shani Jefferson MD   multivitamin with minerals (Hair Skin and Nails Formula) tablet tablet Take 1 tablet by mouth Daily.    Shani Jefferson MD   multivitamin with minerals tablet tablet Take 1 tablet by mouth Daily.    Shani Jefferson MD   ondansetron (Zofran) 8 MG tablet Take 1 tablet by mouth Every 8 (Eight) Hours As Needed for Nausea or Vomiting. 9/4/24   Marga Arreola MD   rosuvastatin (CRESTOR) 5 MG tablet Take 1 tablet by mouth Daily.    Shani Jefferson MD   sertraline (ZOLOFT) 50 MG tablet Take 0.5 tablets by mouth Daily.    Shani Jefferson MD   vitamin B-12 (CYANOCOBALAMIN) 500 MCG tablet Take 1  tablet by mouth Daily.    Provider, MD Shani     Drug Interactions  Reviewed concomitant medications, allergies, labs, comorbidities/medical history, and immunization history.   Drug-drug interactions noted and discussed during education:  Increased risk of QT prolongation with capecitabine and ondansetron (monitor for dizziness, feeling lightheaded, or heart palpitations) . Reminded the patient to let us know before making any changes or starting any new prescription or OTC medications so we can first assess drug interactions.    Recommended Medications Assessment  Bone Health (such as calcium/vitamin D, bisphosphonate, RANKL inhibitor) - Not Indicated  VTE prophylaxis - Not Indicated   Prophylactic antimicrobials - Not Indicated   Tumor lysis syndrome prophylaxis - Not Indicated    Relevant Laboratory Values  Lab Results   Component Value Date    GLUCOSE 99 05/02/2024    CALCIUM 9.6 05/02/2024     05/02/2024    K 3.9 05/02/2024    CO2 24.0 05/02/2024     05/02/2024    BUN 14 05/02/2024    CREATININE 0.71 05/02/2024    BCR 19.7 05/02/2024    ANIONGAP 12.0 05/02/2024     Lab Results   Component Value Date    WBC 5.26 08/16/2024    RBC 4.34 08/16/2024    HGB 14.4 08/16/2024    HCT 42.7 08/16/2024    MCV 98.4 (H) 08/16/2024    MCH 33.2 (H) 08/16/2024    MCHC 33.7 08/16/2024    RDW 12.9 08/16/2024    RDWSD 47.0 08/16/2024    MPV 9.0 08/16/2024     08/16/2024    NEUTRORELPCT 54.1 06/21/2024    LYMPHORELPCT 33.2 06/21/2024    MONORELPCT 9.1 06/21/2024    EOSRELPCT 2.8 06/21/2024    BASORELPCT 0.5 06/21/2024    AUTOIGPER 0.3 06/21/2024    NEUTROABS 2.15 06/21/2024    LYMPHSABS 1.32 06/21/2024    MONOSABS 0.36 06/21/2024    EOSABS 0.11 06/21/2024    BASOSABS 0.02 06/21/2024    AUTOIGNUM 0.01 06/21/2024    NRBC 0.0 06/21/2024     The above labs have been reviewed.  The patient reports she plans to have her baseline labs drawn on 9/6/24 prior to starting capecitabine on 9/8/24.    Initial Education  Provided for Specialty Medication  The patient has been provided with the following education. All questions and concerns have been addressed prior to the patient receiving the medication, and the patient has verbalized understanding of the education and any materials provided.  Additional patient education shall be provided and documented upon request by the patient, provider or payer.      Provided patient with:   Chemo calendar to help improve adherence., Education sheets about the medication, 24-hour clinic phone number and my contact information and instructions to call should additional questions arise.     Medication Education Sheets Provided: (select all that apply)  Oral Specialty Medication: Xeloda (capecitabine)    Other Education Sheets Provided: (select all that apply)  Adherence, CINV, Diarrhea, Hand-Foot Syndrome, and Symptom Tracker Sheet and [facility] Information    TOPICS COMMENTS   Storage and Handling of Oral Specialty Medication Store in the original container, in a dry location out of direct sunlight, and out of reach of children or pets. and Store at room temperature. Discussed safe handling and what to do with any unused medication.   Administration of Oral Specialty Medication Take with food at the same times each day., Take with a full glass of water., and Do not crush or chew tablets.   Adherence to Oral Specialty Regimen and Handling Missed Doses Patient is likely to have good treatment adherence; reinforced the importance of adherence. Reviewed how to address missed doses and encouraged the patient to let us know of any missed doses.   Anemia: role of RBC, cause, s/s, ways to manage, role of transfusion Reviewed the role of RBC and the use of transfusions if hemoglobin decreases too much.  Patient to notify us if she experiences shortness of breath, dizziness, or palpitations.  Also let patient know that she could feel more tired than usual and to try to stay active, but rest if she needs to.     Thrombocytopenia: role of platelet, cause, s/s, ways to prevent bleeding, things to avoid, when to seek help Reviewed the role of platelets in blood clotting and when to call clinic (bloody nose that bleeds for 5 minutes despite pressure, a cut that won't stop bleeding despite pressure, gums that bleed excessively with brushing or flossing). Recommended using an electric razor, soft bristle toothbrush, and blowing your nose gently.    Neutropenia: role of WBC, cause, infection precautions, s/s of infection, when to call MD Reviewed the role of WBC, good infection prevention practices, and when to call the clinic (temperature 100.4F, sore throat, burning urination, etc).   Diarrhea: causes, s/s of dehydration, ways to manage, dietary changes, when to call MD Chemotherapy : Discussed the risk of diarrhea. Instructed patient on use OTC loperamide with diarrhea onset, but emphasized the importance of calling the clinic if 4-6 episodes in 24 hours not relieved by OTC loperamide.   Nausea/Vomiting: cause, use of antiemetics, dietary changes, when to call MD Emetic risk: Low-Minimal  PRN home meds: Ondansetron 8 mg PO q 8 hours PRN for N/V  Pharmacy home meds sent to: [facility] in [place]    Instructed the patient to take a dose of the PRN medication at the first onset of nausea and if it's not working to call us for additional medications.  Also provided non-drug measures to mitigate nausea.   Mouth Sores: causes, oral care, ways to manage Mouth sores can be prevented by making a mouth wash mixture of salt, baking soda, and water. The patient was instructed to swish and spit four times daily after meals and before bedtime.  Use of a soft bristle toothbrush was recommended.  The patient was instructed to avoid alcohol-containing OTC mouthwashes.    Alopecia: cause, ways to manage, resources Discussed the rare possibility of hair thinning or hair loss with capecitabine in 1-10% of patients. Recommended  covering the head with a hat and/or protecting the skin on the head with SPF 30 or higher.    Pain: causes, ways to manage Discussed the possibility of abdominal pain with capecitabine   Skin/Nail Changes: cause, s/s, ways to manage HFS: Hand-Foot Syndrome was discussed, including signs/symptoms, prevention measures, and treatment measures. A supplementary handout with this information was also given to the patient.   Organ Toxicities: cause, s/s, need for diagnostic tests, labs, when to notify MD Discussed potential effects on organ systems, monitoring, diagnostic tests, labs, and when to notify the MD. Discussed the signs/symptoms of the following: cardiotoxicity, hepatotoxicity, nephrotoxicity, and skin changes.   Miscellaneous Financial Issues: None identified - $0 copay at [facility]   Lab Draws: On or before day 1 of each cycle, no sooner than 3 days early.  Discussed the rare risk of dehydration with capecitabine and encouraged the patient to stay well-hydrated during treatment.  Discussed DPD deficiency is a rare enzyme deficiency where the body is unable to break down capecitabine. Discussed if the patient experiences severe side effects within 1-2 days of starting capecitabine, she should call the clinic.    Infertility and Sexuality:  causes, fertility preservation options, sexuality changes, ways to manage, importance of birth control Oral Oncology Therapy: Reviewed safe sex practices and the importance of minimizing exposure to body fluids while on oral oncology therapy., The patient is not of childbearing potential.   Home Care: how to manage bodily fluids Counseled on management of soiled linens and proper flush technique.  Discussed how to manage all the side effects at home and advised when to contact the MD office     Adherence and Self-Administration  Barriers to Patient Adherence and/or Self-Administration: None identified  Methods for Supporting Patient Adherence and/or Self-Administration: dosing  calendar  Expected duration of therapy:  For 8 planned cycles    Goals of Therapy  Patient Goals of Therapy:   Consistently take medications as prescribed  Patient will adhere to medication regimen  Patient will report any medication side effects to healthcare provider  Clinical Goals:    Goals Addressed Today        Specialty Pharmacy General Goal      Clinical goal/therapeutic target: disease control, per the recent oncology clinic notes and labs.             Support patient understanding of medication regimen  Ensure patient knows the pharmacy contact information    Reassessment Plan & Follow-Up  Pharmacist to perform regular reassessments no more than (6) months from the previous assessment.  Welcome information and patient satisfaction survey to be sent by retail team with patient's initial fill.  Care Coordinator to set up future refill outreaches, coordinate prescription delivery, and escalate clinical questions to pharmacist.     Additional Plans, Therapy Recommendations or Therapy Problems to Be Addressed:   -The patient has not received capecitabine from [facility]  yet. We provided the patient with contact information for [facility] today on 9/4/24 in order to call and set up delivery of capecitabine. The patient reports she plans to have her baseline labs drawn on Friday on 9/6/24 and plans to start taking capecitabine on Sunday on 9/8/24.     Attestation  I attest the patient was actively involved in and has agreed to the above plan of care. If the prescribed therapy is at any point deemed not appropriate based on the current or future assessments, a consultation will be initiated with the patient's specialty care provider to determine the best course of action. The revised plan of therapy will be documented along with any required assessments and/or additional patient education provided.     Greta Brito, [facility]  Clinic Oncology Pharmacy Specialist  [phone]    Date and Time: 9/4/2024  09:01 EDT

## 2024-09-06 ENCOUNTER — LAB (OUTPATIENT)
Dept: LAB | Facility: HOSPITAL | Age: 59
End: 2024-09-06
Payer: COMMERCIAL

## 2024-09-06 DIAGNOSIS — Z17.1 MALIGNANT NEOPLASM OF OVERLAPPING SITES OF LEFT BREAST IN FEMALE, ESTROGEN RECEPTOR NEGATIVE: ICD-10-CM

## 2024-09-06 DIAGNOSIS — C50.812 MALIGNANT NEOPLASM OF OVERLAPPING SITES OF LEFT BREAST IN FEMALE, ESTROGEN RECEPTOR NEGATIVE: ICD-10-CM

## 2024-09-06 LAB
ALBUMIN SERPL-MCNC: 4.6 G/DL (ref 3.5–5.2)
ALBUMIN/GLOB SERPL: 1.7 G/DL
ALP SERPL-CCNC: 82 U/L (ref 39–117)
ALT SERPL W P-5'-P-CCNC: 19 U/L (ref 1–33)
ANION GAP SERPL CALCULATED.3IONS-SCNC: 9 MMOL/L (ref 5–15)
AST SERPL-CCNC: 23 U/L (ref 1–32)
BASOPHILS # BLD AUTO: 0.03 10*3/MM3 (ref 0–0.2)
BASOPHILS NFR BLD AUTO: 0.6 % (ref 0–1.5)
BILIRUB SERPL-MCNC: 0.4 MG/DL (ref 0–1.2)
BUN SERPL-MCNC: 11 MG/DL (ref 6–20)
BUN/CREAT SERPL: 14.3 (ref 7–25)
CALCIUM SPEC-SCNC: 10.3 MG/DL (ref 8.6–10.5)
CHLORIDE SERPL-SCNC: 104 MMOL/L (ref 98–107)
CO2 SERPL-SCNC: 27 MMOL/L (ref 22–29)
CREAT SERPL-MCNC: 0.77 MG/DL (ref 0.57–1)
DEPRECATED RDW RBC AUTO: 48.6 FL (ref 37–54)
EGFRCR SERPLBLD CKD-EPI 2021: 90.1 ML/MIN/1.73
EOSINOPHIL # BLD AUTO: 0.12 10*3/MM3 (ref 0–0.4)
EOSINOPHIL NFR BLD AUTO: 2.5 % (ref 0.3–6.2)
ERYTHROCYTE [DISTWIDTH] IN BLOOD BY AUTOMATED COUNT: 13.1 % (ref 12.3–15.4)
GLOBULIN UR ELPH-MCNC: 2.7 GM/DL
GLUCOSE SERPL-MCNC: 94 MG/DL (ref 65–99)
HCT VFR BLD AUTO: 42.1 % (ref 34–46.6)
HGB BLD-MCNC: 14.2 G/DL (ref 12–15.9)
IMM GRANULOCYTES # BLD AUTO: 0.01 10*3/MM3 (ref 0–0.05)
IMM GRANULOCYTES NFR BLD AUTO: 0.2 % (ref 0–0.5)
LYMPHOCYTES # BLD AUTO: 1.88 10*3/MM3 (ref 0.7–3.1)
LYMPHOCYTES NFR BLD AUTO: 39.4 % (ref 19.6–45.3)
MCH RBC QN AUTO: 33.5 PG (ref 26.6–33)
MCHC RBC AUTO-ENTMCNC: 33.7 G/DL (ref 31.5–35.7)
MCV RBC AUTO: 99.3 FL (ref 79–97)
MONOCYTES # BLD AUTO: 0.42 10*3/MM3 (ref 0.1–0.9)
MONOCYTES NFR BLD AUTO: 8.8 % (ref 5–12)
NEUTROPHILS NFR BLD AUTO: 2.31 10*3/MM3 (ref 1.7–7)
NEUTROPHILS NFR BLD AUTO: 48.5 % (ref 42.7–76)
PLATELET # BLD AUTO: 168 10*3/MM3 (ref 140–450)
PMV BLD AUTO: 8.5 FL (ref 6–12)
POTASSIUM SERPL-SCNC: 4.9 MMOL/L (ref 3.5–5.2)
PROT SERPL-MCNC: 7.3 G/DL (ref 6–8.5)
RBC # BLD AUTO: 4.24 10*6/MM3 (ref 3.77–5.28)
SODIUM SERPL-SCNC: 140 MMOL/L (ref 136–145)
WBC NRBC COR # BLD AUTO: 4.77 10*3/MM3 (ref 3.4–10.8)

## 2024-09-06 PROCEDURE — 85025 COMPLETE CBC W/AUTO DIFF WBC: CPT

## 2024-09-06 PROCEDURE — 80053 COMPREHEN METABOLIC PANEL: CPT

## 2024-09-06 PROCEDURE — 36415 COLL VENOUS BLD VENIPUNCTURE: CPT

## 2024-09-06 NOTE — PROGRESS NOTES
Specialty Pharmacy Note     Capecitabine received from [facility] specialty pharmacy and first dose will be taken on 9/8/24.     Yoana Andre, Pharmacy Care Coordinator  Specialty Pharmacy

## 2024-09-26 ENCOUNTER — OFFICE VISIT (OUTPATIENT)
Dept: ONCOLOGY | Facility: CLINIC | Age: 59
End: 2024-09-26
Payer: COMMERCIAL

## 2024-09-26 ENCOUNTER — APPOINTMENT (OUTPATIENT)
Dept: ONCOLOGY | Facility: HOSPITAL | Age: 59
End: 2024-09-26
Payer: COMMERCIAL

## 2024-09-26 ENCOUNTER — HOSPITAL ENCOUNTER (OUTPATIENT)
Dept: ONCOLOGY | Facility: HOSPITAL | Age: 59
Discharge: HOME | End: 2024-09-26
Admitting: INTERNAL MEDICINE
Payer: COMMERCIAL

## 2024-09-26 VITALS
RESPIRATION RATE: 16 BRPM | SYSTOLIC BLOOD PRESSURE: 159 MMHG | WEIGHT: 127 LBS | TEMPERATURE: 97.7 F | HEIGHT: 65 IN | BODY MASS INDEX: 21.16 KG/M2 | HEART RATE: 120 BPM | DIASTOLIC BLOOD PRESSURE: 77 MMHG | OXYGEN SATURATION: 96 %

## 2024-09-26 DIAGNOSIS — C50.812 MALIGNANT NEOPLASM OF OVERLAPPING SITES OF LEFT BREAST IN FEMALE, ESTROGEN RECEPTOR NEGATIVE: Primary | ICD-10-CM

## 2024-09-26 DIAGNOSIS — Z17.1 MALIGNANT NEOPLASM OF OVERLAPPING SITES OF LEFT BREAST IN FEMALE, ESTROGEN RECEPTOR NEGATIVE: Primary | ICD-10-CM

## 2024-09-26 LAB
ALBUMIN SERPL-MCNC: 4.6 G/DL (ref 3.5–5.2)
ALBUMIN/GLOB SERPL: 2.1 G/DL
ALP SERPL-CCNC: 81 U/L (ref 39–117)
ALT SERPL W P-5'-P-CCNC: 16 U/L (ref 1–33)
ANION GAP SERPL CALCULATED.3IONS-SCNC: 8 MMOL/L (ref 5–15)
AST SERPL-CCNC: 21 U/L (ref 1–32)
BASOPHILS # BLD AUTO: 0.02 10*3/MM3 (ref 0–0.2)
BASOPHILS NFR BLD AUTO: 0.5 % (ref 0–1.5)
BILIRUB SERPL-MCNC: 0.2 MG/DL (ref 0–1.2)
BUN SERPL-MCNC: 12 MG/DL (ref 6–20)
BUN/CREAT SERPL: 13.3 (ref 7–25)
CALCIUM SPEC-SCNC: 9.6 MG/DL (ref 8.6–10.5)
CHLORIDE SERPL-SCNC: 106 MMOL/L (ref 98–107)
CO2 SERPL-SCNC: 27 MMOL/L (ref 22–29)
CREAT SERPL-MCNC: 0.9 MG/DL (ref 0.57–1)
DEPRECATED RDW RBC AUTO: 47.5 FL (ref 37–54)
EGFRCR SERPLBLD CKD-EPI 2021: 74.7 ML/MIN/1.73
EOSINOPHIL # BLD AUTO: 0.06 10*3/MM3 (ref 0–0.4)
EOSINOPHIL NFR BLD AUTO: 1.5 % (ref 0.3–6.2)
ERYTHROCYTE [DISTWIDTH] IN BLOOD BY AUTOMATED COUNT: 14.4 % (ref 12.3–15.4)
GLOBULIN UR ELPH-MCNC: 2.2 GM/DL
GLUCOSE SERPL-MCNC: 97 MG/DL (ref 65–99)
HCT VFR BLD AUTO: 38.1 % (ref 34–46.6)
HGB BLD-MCNC: 13.1 G/DL (ref 12–15.9)
IMM GRANULOCYTES # BLD AUTO: 0.01 10*3/MM3 (ref 0–0.05)
IMM GRANULOCYTES NFR BLD AUTO: 0.3 % (ref 0–0.5)
LYMPHOCYTES # BLD AUTO: 1.32 10*3/MM3 (ref 0.7–3.1)
LYMPHOCYTES NFR BLD AUTO: 33.3 % (ref 19.6–45.3)
MCH RBC QN AUTO: 34.7 PG (ref 26.6–33)
MCHC RBC AUTO-ENTMCNC: 34.4 G/DL (ref 31.5–35.7)
MCV RBC AUTO: 100.8 FL (ref 79–97)
MONOCYTES # BLD AUTO: 0.41 10*3/MM3 (ref 0.1–0.9)
MONOCYTES NFR BLD AUTO: 10.4 % (ref 5–12)
NEUTROPHILS NFR BLD AUTO: 2.14 10*3/MM3 (ref 1.7–7)
NEUTROPHILS NFR BLD AUTO: 54 % (ref 42.7–76)
PLATELET # BLD AUTO: 136 10*3/MM3 (ref 140–450)
PMV BLD AUTO: 8.2 FL (ref 6–12)
POTASSIUM SERPL-SCNC: 4.1 MMOL/L (ref 3.5–5.2)
PROT SERPL-MCNC: 6.8 G/DL (ref 6–8.5)
RBC # BLD AUTO: 3.78 10*6/MM3 (ref 3.77–5.28)
SODIUM SERPL-SCNC: 141 MMOL/L (ref 136–145)
WBC NRBC COR # BLD AUTO: 3.96 10*3/MM3 (ref 3.4–10.8)

## 2024-09-26 PROCEDURE — 99214 OFFICE O/P EST MOD 30 MIN: CPT | Performed by: NURSE PRACTITIONER

## 2024-09-26 PROCEDURE — 80053 COMPREHEN METABOLIC PANEL: CPT | Performed by: INTERNAL MEDICINE

## 2024-09-26 PROCEDURE — 36591 DRAW BLOOD OFF VENOUS DEVICE: CPT

## 2024-09-26 PROCEDURE — 25010000002 HEPARIN LOCK FLUSH PER 10 UNITS: Performed by: INTERNAL MEDICINE

## 2024-09-26 PROCEDURE — 85025 COMPLETE CBC W/AUTO DIFF WBC: CPT | Performed by: INTERNAL MEDICINE

## 2024-09-26 RX ORDER — HEPARIN SODIUM (PORCINE) LOCK FLUSH IV SOLN 100 UNIT/ML 100 UNIT/ML
500 SOLUTION INTRAVENOUS AS NEEDED
OUTPATIENT
Start: 2024-09-26

## 2024-09-26 RX ORDER — SODIUM CHLORIDE 0.9 % (FLUSH) 0.9 %
10 SYRINGE (ML) INJECTION AS NEEDED
OUTPATIENT
Start: 2024-09-26

## 2024-09-26 RX ORDER — HEPARIN SODIUM (PORCINE) LOCK FLUSH IV SOLN 100 UNIT/ML 100 UNIT/ML
500 SOLUTION INTRAVENOUS AS NEEDED
Status: DISCONTINUED | OUTPATIENT
Start: 2024-09-26 | End: 2024-09-28 | Stop reason: HOSPADM

## 2024-09-26 RX ADMIN — HEPARIN 500 UNITS: 100 SYRINGE at 15:05

## 2024-09-30 ENCOUNTER — SPECIALTY PHARMACY (OUTPATIENT)
Dept: ONCOLOGY | Facility: HOSPITAL | Age: 59
End: 2024-09-30
Payer: COMMERCIAL

## 2024-10-16 ENCOUNTER — HOSPITAL ENCOUNTER (OUTPATIENT)
Dept: NUCLEAR MEDICINE | Facility: HOSPITAL | Age: 59
Discharge: HOME | End: 2024-10-16
Payer: COMMERCIAL

## 2024-10-16 ENCOUNTER — HOSPITAL ENCOUNTER (OUTPATIENT)
Dept: CT IMAGING | Facility: HOSPITAL | Age: 59
Discharge: HOME | End: 2024-10-16
Admitting: NURSE PRACTITIONER
Payer: COMMERCIAL

## 2024-10-16 DIAGNOSIS — C50.812 MALIGNANT NEOPLASM OF OVERLAPPING SITES OF LEFT BREAST IN FEMALE, ESTROGEN RECEPTOR NEGATIVE: ICD-10-CM

## 2024-10-16 DIAGNOSIS — Z17.1 MALIGNANT NEOPLASM OF OVERLAPPING SITES OF LEFT BREAST IN FEMALE, ESTROGEN RECEPTOR NEGATIVE: ICD-10-CM

## 2024-10-16 PROCEDURE — 71260 CT THORAX DX C+: CPT

## 2024-10-16 PROCEDURE — 25510000001 IOPAMIDOL 61 % SOLUTION: Performed by: NURSE PRACTITIONER

## 2024-10-16 PROCEDURE — 78306 BONE IMAGING WHOLE BODY: CPT

## 2024-10-16 PROCEDURE — A9503 TC99M MEDRONATE: HCPCS | Performed by: NURSE PRACTITIONER

## 2024-10-16 PROCEDURE — 0 TECHNETIUM MEDRONATE KIT: Performed by: NURSE PRACTITIONER

## 2024-10-16 PROCEDURE — 74177 CT ABD & PELVIS W/CONTRAST: CPT

## 2024-10-16 RX ORDER — HEPARIN SODIUM (PORCINE) LOCK FLUSH IV SOLN 100 UNIT/ML 100 UNIT/ML
500 SOLUTION INTRAVENOUS ONCE
Status: DISCONTINUED | OUTPATIENT
Start: 2024-10-16 | End: 2024-10-17 | Stop reason: HOSPADM

## 2024-10-16 RX ORDER — HEPARIN SODIUM (PORCINE) LOCK FLUSH IV SOLN 100 UNIT/ML 100 UNIT/ML
500 SOLUTION INTRAVENOUS AS NEEDED
Status: DISCONTINUED | OUTPATIENT
Start: 2024-10-16 | End: 2024-10-17 | Stop reason: HOSPADM

## 2024-10-16 RX ORDER — TC 99M MEDRONATE 20 MG/10ML
26.8 INJECTION, POWDER, LYOPHILIZED, FOR SOLUTION INTRAVENOUS
Status: COMPLETED | OUTPATIENT
Start: 2024-10-16 | End: 2024-10-16

## 2024-10-16 RX ORDER — HEPARIN SODIUM (PORCINE) LOCK FLUSH IV SOLN 100 UNIT/ML 100 UNIT/ML
SOLUTION INTRAVENOUS
Status: DISPENSED
Start: 2024-10-16 | End: 2024-10-16

## 2024-10-16 RX ORDER — IOPAMIDOL 612 MG/ML
100 INJECTION, SOLUTION INTRAVASCULAR
Status: COMPLETED | OUTPATIENT
Start: 2024-10-16 | End: 2024-10-16

## 2024-10-16 RX ADMIN — TC 99M MEDRONATE 26.8 MILLICURIE: 20 INJECTION, POWDER, LYOPHILIZED, FOR SOLUTION INTRAVENOUS at 11:15

## 2024-10-16 RX ADMIN — IOPAMIDOL 88 ML: 612 INJECTION, SOLUTION INTRAVENOUS at 11:53

## 2024-10-17 ENCOUNTER — HOSPITAL ENCOUNTER (OUTPATIENT)
Dept: ONCOLOGY | Facility: HOSPITAL | Age: 59
Discharge: HOME | End: 2024-10-17
Admitting: NURSE PRACTITIONER
Payer: COMMERCIAL

## 2024-10-17 ENCOUNTER — SPECIALTY PHARMACY (OUTPATIENT)
Dept: ONCOLOGY | Facility: HOSPITAL | Age: 59
End: 2024-10-17
Payer: COMMERCIAL

## 2024-10-17 ENCOUNTER — OFFICE VISIT (OUTPATIENT)
Dept: ONCOLOGY | Facility: CLINIC | Age: 59
End: 2024-10-17
Payer: COMMERCIAL

## 2024-10-17 VITALS
DIASTOLIC BLOOD PRESSURE: 84 MMHG | RESPIRATION RATE: 16 BRPM | TEMPERATURE: 98.3 F | HEART RATE: 119 BPM | WEIGHT: 128 LBS | HEIGHT: 65 IN | SYSTOLIC BLOOD PRESSURE: 154 MMHG | BODY MASS INDEX: 21.33 KG/M2 | OXYGEN SATURATION: 100 %

## 2024-10-17 DIAGNOSIS — C50.812 MALIGNANT NEOPLASM OF OVERLAPPING SITES OF LEFT BREAST IN FEMALE, ESTROGEN RECEPTOR NEGATIVE: Primary | ICD-10-CM

## 2024-10-17 DIAGNOSIS — Z17.1 MALIGNANT NEOPLASM OF OVERLAPPING SITES OF LEFT BREAST IN FEMALE, ESTROGEN RECEPTOR NEGATIVE: Primary | ICD-10-CM

## 2024-10-17 LAB
ALBUMIN SERPL-MCNC: 4.3 G/DL (ref 3.5–5.2)
ALBUMIN/GLOB SERPL: 1.7 G/DL
ALP SERPL-CCNC: 82 U/L (ref 39–117)
ALT SERPL W P-5'-P-CCNC: 34 U/L (ref 1–33)
ANION GAP SERPL CALCULATED.3IONS-SCNC: 12 MMOL/L (ref 5–15)
AST SERPL-CCNC: 36 U/L (ref 1–32)
BASOPHILS # BLD AUTO: 0.01 10*3/MM3 (ref 0–0.2)
BASOPHILS NFR BLD AUTO: 0.1 % (ref 0–1.5)
BILIRUB SERPL-MCNC: 0.5 MG/DL (ref 0–1.2)
BUN SERPL-MCNC: 9 MG/DL (ref 6–20)
BUN/CREAT SERPL: 12.7 (ref 7–25)
CALCIUM SPEC-SCNC: 9 MG/DL (ref 8.6–10.5)
CHLORIDE SERPL-SCNC: 104 MMOL/L (ref 98–107)
CO2 SERPL-SCNC: 22 MMOL/L (ref 22–29)
CREAT SERPL-MCNC: 0.71 MG/DL (ref 0.57–1)
DEPRECATED RDW RBC AUTO: 63.2 FL (ref 37–54)
EGFRCR SERPLBLD CKD-EPI 2021: 99.3 ML/MIN/1.73
EOSINOPHIL # BLD AUTO: 0.05 10*3/MM3 (ref 0–0.4)
EOSINOPHIL NFR BLD AUTO: 0.7 % (ref 0.3–6.2)
ERYTHROCYTE [DISTWIDTH] IN BLOOD BY AUTOMATED COUNT: 17 % (ref 12.3–15.4)
GLOBULIN UR ELPH-MCNC: 2.6 GM/DL
GLUCOSE SERPL-MCNC: 120 MG/DL (ref 65–99)
HCT VFR BLD AUTO: 36.4 % (ref 34–46.6)
HGB BLD-MCNC: 12.4 G/DL (ref 12–15.9)
IMM GRANULOCYTES # BLD AUTO: 0.01 10*3/MM3 (ref 0–0.05)
IMM GRANULOCYTES NFR BLD AUTO: 0.1 % (ref 0–0.5)
LYMPHOCYTES # BLD AUTO: 1.52 10*3/MM3 (ref 0.7–3.1)
LYMPHOCYTES NFR BLD AUTO: 20.4 % (ref 19.6–45.3)
MCH RBC QN AUTO: 35.6 PG (ref 26.6–33)
MCHC RBC AUTO-ENTMCNC: 34.1 G/DL (ref 31.5–35.7)
MCV RBC AUTO: 104.6 FL (ref 79–97)
MONOCYTES # BLD AUTO: 0.97 10*3/MM3 (ref 0.1–0.9)
MONOCYTES NFR BLD AUTO: 13 % (ref 5–12)
NEUTROPHILS NFR BLD AUTO: 4.89 10*3/MM3 (ref 1.7–7)
NEUTROPHILS NFR BLD AUTO: 65.7 % (ref 42.7–76)
PLATELET # BLD AUTO: 108 10*3/MM3 (ref 140–450)
PMV BLD AUTO: 8.7 FL (ref 6–12)
POTASSIUM SERPL-SCNC: 3.9 MMOL/L (ref 3.5–5.2)
PROT SERPL-MCNC: 6.9 G/DL (ref 6–8.5)
RBC # BLD AUTO: 3.48 10*6/MM3 (ref 3.77–5.28)
SODIUM SERPL-SCNC: 138 MMOL/L (ref 136–145)
WBC NRBC COR # BLD AUTO: 7.45 10*3/MM3 (ref 3.4–10.8)

## 2024-10-17 PROCEDURE — 99214 OFFICE O/P EST MOD 30 MIN: CPT | Performed by: INTERNAL MEDICINE

## 2024-10-17 PROCEDURE — 80053 COMPREHEN METABOLIC PANEL: CPT | Performed by: NURSE PRACTITIONER

## 2024-10-17 PROCEDURE — 25010000002 HEPARIN LOCK FLUSH PER 10 UNITS: Performed by: INTERNAL MEDICINE

## 2024-10-17 PROCEDURE — 85025 COMPLETE CBC W/AUTO DIFF WBC: CPT | Performed by: NURSE PRACTITIONER

## 2024-10-17 PROCEDURE — 36591 DRAW BLOOD OFF VENOUS DEVICE: CPT

## 2024-10-17 RX ORDER — HEPARIN SODIUM (PORCINE) LOCK FLUSH IV SOLN 100 UNIT/ML 100 UNIT/ML
500 SOLUTION INTRAVENOUS AS NEEDED
Status: DISCONTINUED | OUTPATIENT
Start: 2024-10-17 | End: 2024-10-18 | Stop reason: HOSPADM

## 2024-10-17 RX ORDER — SODIUM CHLORIDE 0.9 % (FLUSH) 0.9 %
10 SYRINGE (ML) INJECTION AS NEEDED
Status: DISCONTINUED | OUTPATIENT
Start: 2024-10-17 | End: 2024-10-18 | Stop reason: HOSPADM

## 2024-10-17 RX ORDER — HEPARIN SODIUM (PORCINE) LOCK FLUSH IV SOLN 100 UNIT/ML 100 UNIT/ML
500 SOLUTION INTRAVENOUS AS NEEDED
OUTPATIENT
Start: 2024-10-17

## 2024-10-17 RX ORDER — SODIUM CHLORIDE 0.9 % (FLUSH) 0.9 %
10 SYRINGE (ML) INJECTION AS NEEDED
OUTPATIENT
Start: 2024-10-17

## 2024-10-17 RX ADMIN — Medication 10 ML: at 11:04

## 2024-10-17 RX ADMIN — HEPARIN 500 UNITS: 100 SYRINGE at 11:04

## 2024-10-17 NOTE — PROGRESS NOTES
"      PROBLEM LIST:  [id number] triple negative (her2 0+) invasive ductal carcinoma of the left breast  Biopsy of a 1.2 cm left breast mass on 10/4/23.  Pathology showed a high grade IDC.  Neoadjuvant chemotherapy with ddAC followed by taxol started 11/9/23  Bilateral mastectomy on 5/14/24.  Pathology showed a grade 2 IDC measuring 1.1 cm.  0/2 SLN involved.  There was evidence of of response in the invasive carcinoma, and possibly in the lymph nodes.  [id number]  PET/CT 6/11/24 showed a hypermetabolic left lobe liver lesion, several small foci of uptake in normal appearing mediastinal and hilar LN, subtle uptake left C2 pedicle.  Subsequent bone scan 6/19/24 showed no suggestion of bone metastases.  Liver biopsy 6/21/24 showed normal hepatic tissue.  Repeat imaging 7/18/24 showed persistent uptake in the liver.  Laparoscopic surgery 8/23/24 showed no visible abnormality in the liver.  Falciform ligament nodule was biopsies which was benign.  Adjuvant Xeloda started 9/8/2024    Subjective     CHIEF COMPLAINT: breast cancer    HISTORY OF PRESENT ILLNESS:   Swathi Cain returns for follow-up.   She has been feeling okay.  The dizziness has resolved.  She is noticing some irritation and redness on her hands and feet.        Objective      /84   Pulse 119   Temp 98.3 °F (36.8 °C) (Temporal)   Resp 16   Ht 165.1 cm (65\")   Wt 58.1 kg (128 lb)   SpO2 100%   BMI 21.30 kg/m²   Vitals:    10/17/24 1113   PainSc: 0-No pain                         ECOG score: 0             General: well appearing female in no acute distress  Neuro: alert and oriented  HEENT: sclera anicteric  Extremeties: no lower extremity edema  Skin: no rashes, lesions, bruising, or petechiae.  Mild erythema on the palms  Psych: mood and affect appropriate            RECENT LABS:  Lab Results   Component Value Date    WBC 7.45 10/17/2024    HGB 12.4 10/17/2024    HCT 36.4 10/17/2024    .6 (H) 10/17/2024     (L) 10/17/2024 "       Lab Results   Component Value Date    GLUCOSE 97 09/26/2024    BUN 12 09/26/2024    CREATININE 0.90 09/26/2024    BCR 13.3 09/26/2024    K 4.1 09/26/2024    CO2 27.0 09/26/2024    CALCIUM 9.6 09/26/2024    ALBUMIN 4.6 09/26/2024    AST 21 09/26/2024    ALT 16 09/26/2024       I personally reviewed the CT and bone scan imaging studies.        ASSESSMENT AND PLAN:     Swathi Cain is a 59 y.o. female with a T1 cN0 M0 triple negative invasive ductal carcinoma of the left breast.  PDL1 CPS < 1 on original resection.    She started adjuvant Xeloda 9/8/2024.  We will proceed with cycle 3.  We will plan on a total of 8 cycles.  I have reviewed her imaging studies.  Final report not yet available but to my eye there is no significant change.  We will contact her with final report.    Lightheadedness: Resolved.    I will plan to continue monitoring with imaging given the questionable findings on her previous scans.  Repeat imaging in about 3 months.    Follow-up in 3 weeks with labs prior to return.                    Marga Arreola MD  [facility]    10/17/2024          CC:

## 2024-10-18 ENCOUNTER — TELEPHONE (OUTPATIENT)
Dept: ONCOLOGY | Facility: CLINIC | Age: 59
End: 2024-10-18
Payer: COMMERCIAL

## 2024-10-18 NOTE — TELEPHONE ENCOUNTER
Reviewed scan results with patient.  CTs and bone scans are stable.  No new concerning areas noted.  We will plan on repeat bone scan and CT scans in 3 months to continue to monitor closely.  These can be ordered at her next visit.

## 2024-11-01 DIAGNOSIS — C50.812 MALIGNANT NEOPLASM OF OVERLAPPING SITES OF LEFT BREAST IN FEMALE, ESTROGEN RECEPTOR NEGATIVE: Primary | ICD-10-CM

## 2024-11-01 DIAGNOSIS — Z17.1 MALIGNANT NEOPLASM OF OVERLAPPING SITES OF LEFT BREAST IN FEMALE, ESTROGEN RECEPTOR NEGATIVE: Primary | ICD-10-CM

## 2024-11-07 ENCOUNTER — OFFICE VISIT (OUTPATIENT)
Dept: ONCOLOGY | Facility: CLINIC | Age: 59
End: 2024-11-07
Payer: COMMERCIAL

## 2024-11-07 ENCOUNTER — HOSPITAL ENCOUNTER (OUTPATIENT)
Dept: ONCOLOGY | Facility: HOSPITAL | Age: 59
Discharge: HOME | End: 2024-11-07
Admitting: INTERNAL MEDICINE
Payer: COMMERCIAL

## 2024-11-07 VITALS
SYSTOLIC BLOOD PRESSURE: 164 MMHG | WEIGHT: 127 LBS | TEMPERATURE: 97.8 F | OXYGEN SATURATION: 99 % | HEIGHT: 65 IN | HEART RATE: 104 BPM | BODY MASS INDEX: 21.16 KG/M2 | DIASTOLIC BLOOD PRESSURE: 72 MMHG | RESPIRATION RATE: 16 BRPM

## 2024-11-07 DIAGNOSIS — Z17.1 MALIGNANT NEOPLASM OF OVERLAPPING SITES OF LEFT BREAST IN FEMALE, ESTROGEN RECEPTOR NEGATIVE: Primary | ICD-10-CM

## 2024-11-07 DIAGNOSIS — C50.812 MALIGNANT NEOPLASM OF OVERLAPPING SITES OF LEFT BREAST IN FEMALE, ESTROGEN RECEPTOR NEGATIVE: Primary | ICD-10-CM

## 2024-11-07 LAB
ALBUMIN SERPL-MCNC: 4.4 G/DL (ref 3.5–5.2)
ALBUMIN/GLOB SERPL: 1.8 G/DL
ALP SERPL-CCNC: 75 U/L (ref 39–117)
ALT SERPL W P-5'-P-CCNC: 15 U/L (ref 1–33)
ANION GAP SERPL CALCULATED.3IONS-SCNC: 12 MMOL/L (ref 5–15)
AST SERPL-CCNC: 26 U/L (ref 1–32)
BASOPHILS # BLD AUTO: 0.01 10*3/MM3 (ref 0–0.2)
BASOPHILS NFR BLD AUTO: 0.2 % (ref 0–1.5)
BILIRUB SERPL-MCNC: 0.4 MG/DL (ref 0–1.2)
BUN SERPL-MCNC: 14 MG/DL (ref 6–20)
BUN/CREAT SERPL: 17.1 (ref 7–25)
CALCIUM SPEC-SCNC: 9.1 MG/DL (ref 8.6–10.5)
CHLORIDE SERPL-SCNC: 103 MMOL/L (ref 98–107)
CO2 SERPL-SCNC: 23 MMOL/L (ref 22–29)
CREAT SERPL-MCNC: 0.82 MG/DL (ref 0.57–1)
DEPRECATED RDW RBC AUTO: 68.4 FL (ref 37–54)
EGFRCR SERPLBLD CKD-EPI 2021: 83.5 ML/MIN/1.73
EOSINOPHIL # BLD AUTO: 0.12 10*3/MM3 (ref 0–0.4)
EOSINOPHIL NFR BLD AUTO: 2.6 % (ref 0.3–6.2)
ERYTHROCYTE [DISTWIDTH] IN BLOOD BY AUTOMATED COUNT: 18.5 % (ref 12.3–15.4)
GLOBULIN UR ELPH-MCNC: 2.5 GM/DL
GLUCOSE SERPL-MCNC: 106 MG/DL (ref 65–99)
HCT VFR BLD AUTO: 35.3 % (ref 34–46.6)
HGB BLD-MCNC: 12.3 G/DL (ref 12–15.9)
IMM GRANULOCYTES # BLD AUTO: 0.01 10*3/MM3 (ref 0–0.05)
IMM GRANULOCYTES NFR BLD AUTO: 0.2 % (ref 0–0.5)
LYMPHOCYTES # BLD AUTO: 1.99 10*3/MM3 (ref 0.7–3.1)
LYMPHOCYTES NFR BLD AUTO: 43.4 % (ref 19.6–45.3)
MCH RBC QN AUTO: 37.2 PG (ref 26.6–33)
MCHC RBC AUTO-ENTMCNC: 34.8 G/DL (ref 31.5–35.7)
MCV RBC AUTO: 106.6 FL (ref 79–97)
MONOCYTES # BLD AUTO: 0.57 10*3/MM3 (ref 0.1–0.9)
MONOCYTES NFR BLD AUTO: 12.4 % (ref 5–12)
NEUTROPHILS NFR BLD AUTO: 1.89 10*3/MM3 (ref 1.7–7)
NEUTROPHILS NFR BLD AUTO: 41.2 % (ref 42.7–76)
PLATELET # BLD AUTO: 128 10*3/MM3 (ref 140–450)
PMV BLD AUTO: 8.7 FL (ref 6–12)
POTASSIUM SERPL-SCNC: 4 MMOL/L (ref 3.5–5.2)
PROT SERPL-MCNC: 6.9 G/DL (ref 6–8.5)
RBC # BLD AUTO: 3.31 10*6/MM3 (ref 3.77–5.28)
SODIUM SERPL-SCNC: 138 MMOL/L (ref 136–145)
WBC NRBC COR # BLD AUTO: 4.59 10*3/MM3 (ref 3.4–10.8)

## 2024-11-07 PROCEDURE — 36591 DRAW BLOOD OFF VENOUS DEVICE: CPT

## 2024-11-07 PROCEDURE — 85025 COMPLETE CBC W/AUTO DIFF WBC: CPT | Performed by: INTERNAL MEDICINE

## 2024-11-07 PROCEDURE — 80053 COMPREHEN METABOLIC PANEL: CPT | Performed by: INTERNAL MEDICINE

## 2024-11-07 PROCEDURE — 25010000002 HEPARIN LOCK FLUSH PER 10 UNITS: Performed by: INTERNAL MEDICINE

## 2024-11-07 PROCEDURE — 99213 OFFICE O/P EST LOW 20 MIN: CPT | Performed by: NURSE PRACTITIONER

## 2024-11-07 RX ORDER — HEPARIN SODIUM (PORCINE) LOCK FLUSH IV SOLN 100 UNIT/ML 100 UNIT/ML
500 SOLUTION INTRAVENOUS AS NEEDED
Status: DISCONTINUED | OUTPATIENT
Start: 2024-11-07 | End: 2024-11-08 | Stop reason: HOSPADM

## 2024-11-07 RX ADMIN — HEPARIN 500 UNITS: 100 SYRINGE at 13:50

## 2024-11-07 NOTE — PROGRESS NOTES
"      PROBLEM LIST:  [id number] triple negative (her2 0+) invasive ductal carcinoma of the left breast  Biopsy of a 1.2 cm left breast mass on 10/4/23.  Pathology showed a high grade IDC.  Neoadjuvant chemotherapy with ddAC followed by taxol started 11/9/23  Bilateral mastectomy on 5/14/24.  Pathology showed a grade 2 IDC measuring 1.1 cm.  0/2 SLN involved.  There was evidence of of response in the invasive carcinoma, and possibly in the lymph nodes.  [id number]  PET/CT 6/11/24 showed a hypermetabolic left lobe liver lesion, several small foci of uptake in normal appearing mediastinal and hilar LN, subtle uptake left C2 pedicle.  Subsequent bone scan 6/19/24 showed no suggestion of bone metastases.  Liver biopsy 6/21/24 showed normal hepatic tissue.  Repeat imaging 7/18/24 showed persistent uptake in the liver.  Laparoscopic surgery 8/23/24 showed no visible abnormality in the liver.  Falciform ligament nodule was biopsies which was benign.  Adjuvant Xeloda started 9/8/2024    Subjective     CHIEF COMPLAINT: breast cancer    HISTORY OF PRESENT ILLNESS:   Swathi Cain returns for follow-up.   She has completed 3 cycles of adjuvant Xeloda.  She is scheduled to start cycle 4 of Xeloda 11/10/2024.  She has noticed some redness on the bottoms of her hands and feet.  She has also noticed some tenderness to her hands and feet with cycle 3.  She has had no cracking or peeling of the palmar plantar surfaces.    She continues to exercise daily.  Her energy level is good.  She denies any chest pain, cough, shortness of air or diaphoresis.    She did receive her flu vaccine yesterday.      Objective      /72   Pulse 104   Temp 97.8 °F (36.6 °C)   Resp 16   Ht 165.1 cm (65\")   Wt 57.6 kg (127 lb)   SpO2 99%   BMI 21.13 kg/m²   Vitals:    11/07/24 1408   PainSc: 0-No pain                           ECOG score: 0             General: well appearing female in no acute distress  Neuro: alert and oriented  HEENT: " sclera anicteric  Extremeties: no lower extremity edema  Skin: no rashes, lesions, bruising, or petechiae.  Mild erythema on the palms/plantar surfaces  Psych: mood and affect appropriate            RECENT LABS:  Lab Results   Component Value Date    WBC 4.59 11/07/2024    HGB 12.3 11/07/2024    HCT 35.3 11/07/2024    .6 (H) 11/07/2024     (L) 11/07/2024       Lab Results   Component Value Date    GLUCOSE 106 (H) 11/07/2024    BUN 14 11/07/2024    CREATININE 0.82 11/07/2024    BCR 17.1 11/07/2024    K 4.0 11/07/2024    CO2 23.0 11/07/2024    CALCIUM 9.1 11/07/2024    ALBUMIN 4.4 11/07/2024    AST 26 11/07/2024    ALT 15 11/07/2024               ASSESSMENT AND PLAN:     Swathi Cain is a 59 y.o. female with a T1 cN0 M0 triple negative invasive ductal carcinoma of the left breast.  PDL1 CPS < 1 on original resection.    She started adjuvant Xeloda 9/8/2024.  We will proceed with cycle 4 on 11/10/2024.  We will plan on a total of 8 cycles.  We discussed if she has increased pain/tenderness in her hands or feet along with any cracking or peeling to contact us immediately.  Labs from today were reviewed and are adequate to proceed with cycle 4.    Lightheadedness: Resolved.    I will plan to continue monitoring with imaging given the questionable findings on her previous scans.  Repeat imaging in about 2 months.    Follow-up in 3 weeks with labs prior to return.                    Sally Arango APRN  [facility]    11/7/2024          CC:

## 2024-11-08 ENCOUNTER — SPECIALTY PHARMACY (OUTPATIENT)
Dept: ONCOLOGY | Facility: HOSPITAL | Age: 59
End: 2024-11-08
Payer: COMMERCIAL

## 2024-11-20 DIAGNOSIS — Z17.1 MALIGNANT NEOPLASM OF OVERLAPPING SITES OF LEFT BREAST IN FEMALE, ESTROGEN RECEPTOR NEGATIVE: Primary | ICD-10-CM

## 2024-11-20 DIAGNOSIS — C50.812 MALIGNANT NEOPLASM OF OVERLAPPING SITES OF LEFT BREAST IN FEMALE, ESTROGEN RECEPTOR NEGATIVE: Primary | ICD-10-CM

## 2024-11-27 ENCOUNTER — HOSPITAL ENCOUNTER (OUTPATIENT)
Dept: ONCOLOGY | Facility: HOSPITAL | Age: 59
Discharge: HOME | End: 2024-11-27
Admitting: INTERNAL MEDICINE
Payer: COMMERCIAL

## 2024-11-27 ENCOUNTER — SPECIALTY PHARMACY (OUTPATIENT)
Dept: ONCOLOGY | Facility: HOSPITAL | Age: 59
End: 2024-11-27
Payer: COMMERCIAL

## 2024-11-27 ENCOUNTER — OFFICE VISIT (OUTPATIENT)
Dept: ONCOLOGY | Facility: CLINIC | Age: 59
End: 2024-11-27
Payer: COMMERCIAL

## 2024-11-27 VITALS
OXYGEN SATURATION: 99 % | TEMPERATURE: 97.7 F | WEIGHT: 127.7 LBS | DIASTOLIC BLOOD PRESSURE: 74 MMHG | BODY MASS INDEX: 21.27 KG/M2 | HEIGHT: 65 IN | SYSTOLIC BLOOD PRESSURE: 140 MMHG | RESPIRATION RATE: 16 BRPM | HEART RATE: 110 BPM

## 2024-11-27 DIAGNOSIS — C50.812 MALIGNANT NEOPLASM OF OVERLAPPING SITES OF LEFT BREAST IN FEMALE, ESTROGEN RECEPTOR NEGATIVE: Primary | ICD-10-CM

## 2024-11-27 DIAGNOSIS — Z17.1 MALIGNANT NEOPLASM OF OVERLAPPING SITES OF LEFT BREAST IN FEMALE, ESTROGEN RECEPTOR NEGATIVE: Primary | ICD-10-CM

## 2024-11-27 LAB
ALBUMIN SERPL-MCNC: 4.5 G/DL (ref 3.5–5.2)
ALBUMIN/GLOB SERPL: 1.9 G/DL
ALP SERPL-CCNC: 76 U/L (ref 39–117)
ALT SERPL W P-5'-P-CCNC: 13 U/L (ref 1–33)
ANION GAP SERPL CALCULATED.3IONS-SCNC: 13 MMOL/L (ref 5–15)
AST SERPL-CCNC: 26 U/L (ref 1–32)
BASOPHILS # BLD AUTO: 0.02 10*3/MM3 (ref 0–0.2)
BASOPHILS NFR BLD AUTO: 0.4 % (ref 0–1.5)
BILIRUB SERPL-MCNC: 0.4 MG/DL (ref 0–1.2)
BUN SERPL-MCNC: 8 MG/DL (ref 6–20)
BUN/CREAT SERPL: 11.3 (ref 7–25)
CALCIUM SPEC-SCNC: 9.4 MG/DL (ref 8.6–10.5)
CHLORIDE SERPL-SCNC: 106 MMOL/L (ref 98–107)
CO2 SERPL-SCNC: 22 MMOL/L (ref 22–29)
CREAT SERPL-MCNC: 0.71 MG/DL (ref 0.57–1)
DEPRECATED RDW RBC AUTO: 75.9 FL (ref 37–54)
EGFRCR SERPLBLD CKD-EPI 2021: 99.3 ML/MIN/1.73
EOSINOPHIL # BLD AUTO: 0.12 10*3/MM3 (ref 0–0.4)
EOSINOPHIL NFR BLD AUTO: 2.6 % (ref 0.3–6.2)
ERYTHROCYTE [DISTWIDTH] IN BLOOD BY AUTOMATED COUNT: 18.9 % (ref 12.3–15.4)
GLOBULIN UR ELPH-MCNC: 2.4 GM/DL
GLUCOSE SERPL-MCNC: 114 MG/DL (ref 65–99)
HCT VFR BLD AUTO: 35.3 % (ref 34–46.6)
HGB BLD-MCNC: 12.4 G/DL (ref 12–15.9)
IMM GRANULOCYTES # BLD AUTO: 0 10*3/MM3 (ref 0–0.05)
IMM GRANULOCYTES NFR BLD AUTO: 0 % (ref 0–0.5)
LYMPHOCYTES # BLD AUTO: 2.02 10*3/MM3 (ref 0.7–3.1)
LYMPHOCYTES NFR BLD AUTO: 43.7 % (ref 19.6–45.3)
MCH RBC QN AUTO: 38.6 PG (ref 26.6–33)
MCHC RBC AUTO-ENTMCNC: 35.1 G/DL (ref 31.5–35.7)
MCV RBC AUTO: 110 FL (ref 79–97)
MONOCYTES # BLD AUTO: 0.53 10*3/MM3 (ref 0.1–0.9)
MONOCYTES NFR BLD AUTO: 11.5 % (ref 5–12)
NEUTROPHILS NFR BLD AUTO: 1.93 10*3/MM3 (ref 1.7–7)
NEUTROPHILS NFR BLD AUTO: 41.8 % (ref 42.7–76)
PLATELET # BLD AUTO: 126 10*3/MM3 (ref 140–450)
PMV BLD AUTO: 8.6 FL (ref 6–12)
POTASSIUM SERPL-SCNC: 3.6 MMOL/L (ref 3.5–5.2)
PROT SERPL-MCNC: 6.9 G/DL (ref 6–8.5)
RBC # BLD AUTO: 3.21 10*6/MM3 (ref 3.77–5.28)
SODIUM SERPL-SCNC: 141 MMOL/L (ref 136–145)
WBC NRBC COR # BLD AUTO: 4.62 10*3/MM3 (ref 3.4–10.8)

## 2024-11-27 PROCEDURE — 25010000002 HEPARIN LOCK FLUSH PER 10 UNITS: Performed by: INTERNAL MEDICINE

## 2024-11-27 PROCEDURE — 99213 OFFICE O/P EST LOW 20 MIN: CPT | Performed by: NURSE PRACTITIONER

## 2024-11-27 PROCEDURE — 85025 COMPLETE CBC W/AUTO DIFF WBC: CPT | Performed by: INTERNAL MEDICINE

## 2024-11-27 PROCEDURE — 80053 COMPREHEN METABOLIC PANEL: CPT | Performed by: INTERNAL MEDICINE

## 2024-11-27 PROCEDURE — 36591 DRAW BLOOD OFF VENOUS DEVICE: CPT

## 2024-11-27 RX ORDER — HEPARIN SODIUM (PORCINE) LOCK FLUSH IV SOLN 100 UNIT/ML 100 UNIT/ML
500 SOLUTION INTRAVENOUS AS NEEDED
Status: DISCONTINUED | OUTPATIENT
Start: 2024-11-27 | End: 2024-11-28 | Stop reason: HOSPADM

## 2024-11-27 RX ORDER — TRAMADOL HYDROCHLORIDE 50 MG/1
50 TABLET ORAL EVERY 6 HOURS PRN
Qty: 60 TABLET | Refills: 1 | Status: SHIPPED | OUTPATIENT
Start: 2024-11-27

## 2024-11-27 RX ADMIN — HEPARIN 500 UNITS: 100 SYRINGE at 12:30

## 2024-11-27 NOTE — PROGRESS NOTES
"      PROBLEM LIST:  [id number] triple negative (her2 0+) invasive ductal carcinoma of the left breast  Biopsy of a 1.2 cm left breast mass on 10/4/23.  Pathology showed a high grade IDC.  Neoadjuvant chemotherapy with ddAC followed by taxol started 11/9/23  Bilateral mastectomy on 5/14/24.  Pathology showed a grade 2 IDC measuring 1.1 cm.  0/2 SLN involved.  There was evidence of of response in the invasive carcinoma, and possibly in the lymph nodes.  [id number]  PET/CT 6/11/24 showed a hypermetabolic left lobe liver lesion, several small foci of uptake in normal appearing mediastinal and hilar LN, subtle uptake left C2 pedicle.  Subsequent bone scan 6/19/24 showed no suggestion of bone metastases.  Liver biopsy 6/21/24 showed normal hepatic tissue.  Repeat imaging 7/18/24 showed persistent uptake in the liver.  Laparoscopic surgery 8/23/24 showed no visible abnormality in the liver.  Falciform ligament nodule was biopsies which was benign.  Adjuvant Xeloda started 9/8/2024    Subjective     CHIEF COMPLAINT: breast cancer    HISTORY OF PRESENT ILLNESS:   Swathi Cain returns for follow-up.   She has completed 4 cycles of adjuvant Xeloda.  She is scheduled to start cycle 5 of Xeloda 12/1/2024.  She is having significant pain on her palmar plantar surfaces.  She also has peeling and cracking of her hands despite using frequent lotion.    She has also noticed her stomach has been more uneasy over the last cycle.  She is still eating and does not consider it to be nausea just unsettled.  She denies any diarrhea.          Objective      /74   Pulse 110   Temp 97.7 °F (36.5 °C) (Temporal)   Resp 16   Ht 165.1 cm (65\")   Wt 57.9 kg (127 lb 11.2 oz)   SpO2 99%   BMI 21.25 kg/m²   Vitals:    11/27/24 1303   PainSc:   7   PainLoc: Hand  Comment: bilateral hands and feet                             ECOG score: 1             General: well appearing female in no acute distress  Neuro: alert and oriented  HEENT: " sclera anicteric  Extremeties: no lower extremity edema  Skin: no rashes, lesions, bruising, or petechiae.  Moderate erythema on the palms/plantar surfaces with cracking and peeling of the palmar surfaces  Psych: mood and affect appropriate            RECENT LABS:  Lab Results   Component Value Date    WBC 4.62 11/27/2024    HGB 12.4 11/27/2024    HCT 35.3 11/27/2024    .0 (H) 11/27/2024     (L) 11/27/2024       Lab Results   Component Value Date    GLUCOSE 114 (H) 11/27/2024    BUN 8 11/27/2024    CREATININE 0.71 11/27/2024    BCR 11.3 11/27/2024    K 3.6 11/27/2024    CO2 22.0 11/27/2024    CALCIUM 9.4 11/27/2024    ALBUMIN 4.5 11/27/2024    AST 26 11/27/2024    ALT 13 11/27/2024               ASSESSMENT AND PLAN:     Swathi Cain is a 59 y.o. female with a T1 cN0 M0 triple negative invasive ductal carcinoma of the left breast.  PDL1 CPS < 1 on original resection.    She started adjuvant Xeloda 9/8/2024.  She has completed 4 cycles of Xeloda.  She is due to start cycle 5 on 12/1/2024.  We will hold Xeloda until 12/8/2024.  If her hands and feet are much better I will have her start the Xeloda at 1000 mg twice a day days 1 through 14 followed by 7 days off.  I have asked her to contact us on 12/6/2024 to let us know if her hands are feeling better and she feels that she can restart with cycle 5 on 12 8 as planned.  CBC from today was reviewed and is stable.    Hand-foot syndrome pain: I will send in a prescription for tramadol to see if this will help with the pain associated with the burning, cracking and peeling of her hands and feet.  We discussed that time and decreased dose of the Xeloda will definitely help with the hand-foot syndrome pain.    I will plan to continue monitoring with imaging given the questionable findings on her previous scans.  Repeat imaging early January 2025.    Follow-up in 4 weeks with labs prior to return.                    Sally Arango, APRN  [facility]  Hematology and Oncology    11/27/2024          CC:

## 2024-12-06 ENCOUNTER — TELEPHONE (OUTPATIENT)
Dept: ONCOLOGY | Facility: CLINIC | Age: 59
End: 2024-12-06
Payer: COMMERCIAL

## 2024-12-06 NOTE — TELEPHONE ENCOUNTER
Patient called she wants Tammy to know that her symptoms are better, and she is going to go ahead and start the Xeloda 1000 mg 2 talbles twice daily. She thinks the pharmacy was trying to get in touch with her maybe about the dosage change? Please call.

## 2024-12-06 NOTE — TELEPHONE ENCOUNTER
I called patient back and told her that  I would let Tammy know and informed her that our pharmacy team had let the specialty pharmacy know about the reduced dose.  Pt verbalized understanding.

## 2024-12-10 ENCOUNTER — SPECIALTY PHARMACY (OUTPATIENT)
Dept: ONCOLOGY | Facility: HOSPITAL | Age: 59
End: 2024-12-10
Payer: COMMERCIAL

## 2024-12-10 DIAGNOSIS — C50.812 MALIGNANT NEOPLASM OF OVERLAPPING SITES OF LEFT BREAST IN FEMALE, ESTROGEN RECEPTOR NEGATIVE: ICD-10-CM

## 2024-12-10 DIAGNOSIS — Z17.1 MALIGNANT NEOPLASM OF OVERLAPPING SITES OF LEFT BREAST IN FEMALE, ESTROGEN RECEPTOR NEGATIVE: ICD-10-CM

## 2024-12-10 RX ORDER — CAPECITABINE 500 MG/1
1000 TABLET, FILM COATED ORAL 2 TIMES DAILY
Qty: 56 TABLET | Refills: 3 | Status: SHIPPED | OUTPATIENT
Start: 2024-12-10

## 2024-12-10 NOTE — PROGRESS NOTES
Re: Refills of Oral Specialty Medication - Xeloda (capecitabine)    Drug-Drug Interactions: The current medication list was reviewed and there are some relevant drug-drug interactions with the oral specialty medication that were discussed during education, including:  Concomitant use of Xeloda and Zofran increases the risk of QTc prolongation.   Medication Allergies: The patient has NKDA.  Review of Labs/Dose Adjustments: DOSE CHANGE - I reviewed the most recent note and labs. Due to hand-foot syndrome, the dose is being held and lowered. I sent refills as described below.    Drug: Xeloda (capecitabine)  Strength: 500 mg  Directions: Take 2 tablets by mouth twice a day on days 1-14, then off for 7 days in each 21-day cycle. Take within 30 minutes of a meal.   Quantity: 56  Refills: 3 (8 total cycles)  Pharmacy prescription sent to: [facility]    Ann Cowden Mayer, PharmD, BCPS  Oncology Clinical Pharmacist  Phone [phone]    12/10/2024  12:50 EST

## 2024-12-10 NOTE — PROGRESS NOTES
Oral Chemotherapy - Double Check    Drug: Xeloda (capecitabine)  Strength: 500 mg  Directions: Take 2 tablets by mouth twice daily with food on days 1-14, then off for 7 days in a 21-day cycle.   QTY: 56 tablets  RF:3    Released to pharmacy: [facility]    Completed independent double check on medication order/RX.    Greta Brito, PharmD  Clinic Oncology Pharmacy Specialist  [phone]    12/10/2024  13:02 EST

## 2024-12-17 ENCOUNTER — SPECIALTY PHARMACY (OUTPATIENT)
Dept: ONCOLOGY | Facility: HOSPITAL | Age: 59
End: 2024-12-17
Payer: COMMERCIAL

## 2024-12-17 NOTE — PROGRESS NOTES
Specialty Pharmacy Note     Capecitabine (reduced dose) received from [facility] specialty pharmacy on 12/16/24.  Patient has tablets left from previous fill that she will finish.  Patient is aware of new dosage/directions.      Yoana Andre, Pharmacy Care Coordinator  Specialty Pharmacy

## 2024-12-18 DIAGNOSIS — Z17.1 MALIGNANT NEOPLASM OF OVERLAPPING SITES OF LEFT BREAST IN FEMALE, ESTROGEN RECEPTOR NEGATIVE: Primary | ICD-10-CM

## 2024-12-18 DIAGNOSIS — C50.812 MALIGNANT NEOPLASM OF OVERLAPPING SITES OF LEFT BREAST IN FEMALE, ESTROGEN RECEPTOR NEGATIVE: Primary | ICD-10-CM

## 2024-12-26 RX ORDER — SODIUM CHLORIDE 0.9 % (FLUSH) 0.9 %
10 SYRINGE (ML) INJECTION AS NEEDED
Status: CANCELLED | OUTPATIENT
Start: 2024-12-26

## 2024-12-26 RX ORDER — HEPARIN SODIUM (PORCINE) LOCK FLUSH IV SOLN 100 UNIT/ML 100 UNIT/ML
500 SOLUTION INTRAVENOUS AS NEEDED
Status: CANCELLED | OUTPATIENT
Start: 2024-12-26

## 2024-12-27 ENCOUNTER — RESEARCH ENCOUNTER (OUTPATIENT)
Dept: OTHER | Facility: OTHER | Age: 59
End: 2024-12-27
Payer: COMMERCIAL

## 2024-12-27 ENCOUNTER — HOSPITAL ENCOUNTER (OUTPATIENT)
Dept: ONCOLOGY | Facility: HOSPITAL | Age: 59
Discharge: HOME | End: 2024-12-27
Admitting: INTERNAL MEDICINE
Payer: COMMERCIAL

## 2024-12-27 ENCOUNTER — OFFICE VISIT (OUTPATIENT)
Dept: ONCOLOGY | Facility: CLINIC | Age: 59
End: 2024-12-27
Payer: COMMERCIAL

## 2024-12-27 VITALS
SYSTOLIC BLOOD PRESSURE: 138 MMHG | RESPIRATION RATE: 16 BRPM | TEMPERATURE: 96.6 F | OXYGEN SATURATION: 99 % | DIASTOLIC BLOOD PRESSURE: 67 MMHG | WEIGHT: 133 LBS | HEART RATE: 118 BPM | HEIGHT: 65 IN | BODY MASS INDEX: 22.16 KG/M2

## 2024-12-27 VITALS
DIASTOLIC BLOOD PRESSURE: 67 MMHG | TEMPERATURE: 96.6 F | SYSTOLIC BLOOD PRESSURE: 138 MMHG | HEART RATE: 118 BPM | BODY MASS INDEX: 22.16 KG/M2 | WEIGHT: 133 LBS | HEIGHT: 65 IN | RESPIRATION RATE: 16 BRPM

## 2024-12-27 DIAGNOSIS — Z17.1 MALIGNANT NEOPLASM OF OVERLAPPING SITES OF LEFT BREAST IN FEMALE, ESTROGEN RECEPTOR NEGATIVE: Primary | ICD-10-CM

## 2024-12-27 DIAGNOSIS — C50.812 MALIGNANT NEOPLASM OF OVERLAPPING SITES OF LEFT BREAST IN FEMALE, ESTROGEN RECEPTOR NEGATIVE: Primary | ICD-10-CM

## 2024-12-27 LAB
ALBUMIN SERPL-MCNC: 4.3 G/DL (ref 3.5–5.2)
ALBUMIN/GLOB SERPL: 1.8 G/DL
ALP SERPL-CCNC: 81 U/L (ref 39–117)
ALT SERPL W P-5'-P-CCNC: 16 U/L (ref 1–33)
ANION GAP SERPL CALCULATED.3IONS-SCNC: 12 MMOL/L (ref 5–15)
AST SERPL-CCNC: 21 U/L (ref 1–32)
BASOPHILS # BLD AUTO: 0.01 10*3/MM3 (ref 0–0.2)
BASOPHILS NFR BLD AUTO: 0.2 % (ref 0–1.5)
BILIRUB SERPL-MCNC: 0.3 MG/DL (ref 0–1.2)
BUN SERPL-MCNC: 12 MG/DL (ref 6–20)
BUN/CREAT SERPL: 16.2 (ref 7–25)
CALCIUM SPEC-SCNC: 9.7 MG/DL (ref 8.6–10.5)
CHLORIDE SERPL-SCNC: 107 MMOL/L (ref 98–107)
CO2 SERPL-SCNC: 24 MMOL/L (ref 22–29)
CREAT SERPL-MCNC: 0.74 MG/DL (ref 0.57–1)
DEPRECATED RDW RBC AUTO: 66.2 FL (ref 37–54)
EGFRCR SERPLBLD CKD-EPI 2021: 94.5 ML/MIN/1.73
EOSINOPHIL # BLD AUTO: 0.12 10*3/MM3 (ref 0–0.4)
EOSINOPHIL NFR BLD AUTO: 2.5 % (ref 0.3–6.2)
ERYTHROCYTE [DISTWIDTH] IN BLOOD BY AUTOMATED COUNT: 15.6 % (ref 12.3–15.4)
GLOBULIN UR ELPH-MCNC: 2.4 GM/DL
GLUCOSE SERPL-MCNC: 100 MG/DL (ref 65–99)
HCT VFR BLD AUTO: 36.3 % (ref 34–46.6)
HGB BLD-MCNC: 12.7 G/DL (ref 12–15.9)
IMM GRANULOCYTES # BLD AUTO: 0.01 10*3/MM3 (ref 0–0.05)
IMM GRANULOCYTES NFR BLD AUTO: 0.2 % (ref 0–0.5)
LYMPHOCYTES # BLD AUTO: 2.13 10*3/MM3 (ref 0.7–3.1)
LYMPHOCYTES NFR BLD AUTO: 44.2 % (ref 19.6–45.3)
MCH RBC QN AUTO: 39.3 PG (ref 26.6–33)
MCHC RBC AUTO-ENTMCNC: 35 G/DL (ref 31.5–35.7)
MCV RBC AUTO: 112.4 FL (ref 79–97)
MONOCYTES # BLD AUTO: 0.49 10*3/MM3 (ref 0.1–0.9)
MONOCYTES NFR BLD AUTO: 10.2 % (ref 5–12)
NEUTROPHILS NFR BLD AUTO: 2.06 10*3/MM3 (ref 1.7–7)
NEUTROPHILS NFR BLD AUTO: 42.7 % (ref 42.7–76)
PLATELET # BLD AUTO: 128 10*3/MM3 (ref 140–450)
PMV BLD AUTO: 8.9 FL (ref 6–12)
POTASSIUM SERPL-SCNC: 3.8 MMOL/L (ref 3.5–5.2)
PROT SERPL-MCNC: 6.7 G/DL (ref 6–8.5)
RBC # BLD AUTO: 3.23 10*6/MM3 (ref 3.77–5.28)
SODIUM SERPL-SCNC: 143 MMOL/L (ref 136–145)
WBC NRBC COR # BLD AUTO: 4.82 10*3/MM3 (ref 3.4–10.8)

## 2024-12-27 PROCEDURE — 25010000002 HEPARIN LOCK FLUSH PER 10 UNITS: Performed by: INTERNAL MEDICINE

## 2024-12-27 PROCEDURE — 36591 DRAW BLOOD OFF VENOUS DEVICE: CPT

## 2024-12-27 PROCEDURE — 85025 COMPLETE CBC W/AUTO DIFF WBC: CPT | Performed by: INTERNAL MEDICINE

## 2024-12-27 PROCEDURE — 80053 COMPREHEN METABOLIC PANEL: CPT | Performed by: INTERNAL MEDICINE

## 2024-12-27 RX ORDER — SODIUM CHLORIDE 0.9 % (FLUSH) 0.9 %
10 SYRINGE (ML) INJECTION AS NEEDED
Status: DISCONTINUED | OUTPATIENT
Start: 2024-12-27 | End: 2024-12-28 | Stop reason: HOSPADM

## 2024-12-27 RX ORDER — SODIUM CHLORIDE 0.9 % (FLUSH) 0.9 %
10 SYRINGE (ML) INJECTION AS NEEDED
OUTPATIENT
Start: 2024-12-27

## 2024-12-27 RX ORDER — HEPARIN SODIUM (PORCINE) LOCK FLUSH IV SOLN 100 UNIT/ML 100 UNIT/ML
500 SOLUTION INTRAVENOUS AS NEEDED
Status: DISCONTINUED | OUTPATIENT
Start: 2024-12-27 | End: 2024-12-28 | Stop reason: HOSPADM

## 2024-12-27 RX ORDER — HEPARIN SODIUM (PORCINE) LOCK FLUSH IV SOLN 100 UNIT/ML 100 UNIT/ML
500 SOLUTION INTRAVENOUS AS NEEDED
OUTPATIENT
Start: 2024-12-27

## 2024-12-27 RX ADMIN — Medication 500 UNITS: at 13:39

## 2024-12-27 RX ADMIN — Medication 10 ML: at 13:39

## 2024-12-27 NOTE — RESEARCH
Patient Name:  Swathi Cain  YOB: 1965  Patient Age:  59 y.o.  Patient's Sex:  female    Date of Service:  12/27/2024    Provider:  ADAM Arreola MD                     RESEARCH NOTE                  Protocol --ICE COMPRESS: Randomized Trial of Limb Cryocompression Versus Continuous Compression Versus Low Cyclic Compression for the Prevention of Taxane-Induced Peripheral Neuropathy   NCT #[id number]     Subject ID:  [id number]  ARM 3:  Low Cyclic Compression     Week 52 Assessment Timepoint:  Required QOLs completed.  The following neuroassessments were completed:  Neuropen (monofilament and Neurotip)  [place] ([address])  Timed Get Up and Go    Medications reviewed.    No report of any neuropathy.  No study related AEs identified.       Pt was reminded that this was the final time point for the study.     Thank you.    ANALI Kinsey, BSN, RN, RNC

## 2024-12-27 NOTE — PROGRESS NOTES
"      PROBLEM LIST:  [id number] triple negative (her2 0+) invasive ductal carcinoma of the left breast  Biopsy of a 1.2 cm left breast mass on 10/4/23.  Pathology showed a high grade IDC.  Neoadjuvant chemotherapy with ddAC followed by taxol started 11/9/23  Bilateral mastectomy on 5/14/24.  Pathology showed a grade 2 IDC measuring 1.1 cm.  0/2 SLN involved.  There was evidence of of response in the invasive carcinoma, and possibly in the lymph nodes.  [id number]  PET/CT 6/11/24 showed a hypermetabolic left lobe liver lesion, several small foci of uptake in normal appearing mediastinal and hilar LN, subtle uptake left C2 pedicle.  Subsequent bone scan 6/19/24 showed no suggestion of bone metastases.  Liver biopsy 6/21/24 showed normal hepatic tissue.  Repeat imaging 7/18/24 showed persistent uptake in the liver.  Laparoscopic surgery 8/23/24 showed no visible abnormality in the liver.  Falciform ligament nodule was biopsies which was benign.  Adjuvant Xeloda started 9/8/2024    Subjective     CHIEF COMPLAINT: breast cancer    HISTORY OF PRESENT ILLNESS:   Swathi Cain returns for follow-up.  We reduced capecitabine to 1000 mg twice a day days 1 through 14 followed by 7 days off starting with cycle 5.  She started cycle 5 on 12/8/2024.  She tolerated the reduced dose much better.  She is no longer having pain on her palmar/plantar surfaces.  She does have some mild peeling and cracking of her hands and feet and is using lotion frequently.    She is eating well.  She denies any nausea or vomiting.  No fevers or chills.  No diarrhea.        Objective      /67   Pulse 118   Temp 96.6 °F (35.9 °C) (Temporal)   Resp 16   Ht 165.1 cm (65\")   Wt 60.3 kg (133 lb)   SpO2 99% Comment: RA  BMI 22.13 kg/m²   Vitals:    12/27/24 1357   PainSc: 0-No pain                               ECOG score: 1             General: well appearing female in no acute distress  Neuro: alert and oriented  HEENT: sclera " anicteric  Extremeties: no lower extremity edema  Skin: no rashes, lesions, bruising, or petechiae.  Mild erythema on the palms/plantar surfaces with cracking and peeling of the palmar surfaces  Psych: mood and affect appropriate            RECENT LABS:  Lab Results   Component Value Date    WBC 4.82 12/27/2024    HGB 12.7 12/27/2024    HCT 36.3 12/27/2024    .4 (H) 12/27/2024     (L) 12/27/2024       Lab Results   Component Value Date    GLUCOSE 114 (H) 11/27/2024    BUN 8 11/27/2024    CREATININE 0.71 11/27/2024    BCR 11.3 11/27/2024    K 3.6 11/27/2024    CO2 22.0 11/27/2024    CALCIUM 9.4 11/27/2024    ALBUMIN 4.5 11/27/2024    AST 26 11/27/2024    ALT 13 11/27/2024               ASSESSMENT AND PLAN:     Swathi Cain is a 59 y.o. female with a T1 cN0 M0 triple negative invasive ductal carcinoma of the left breast.  PDL1 CPS < 1 on original resection.    She started adjuvant Xeloda 9/8/2024.  She has completed 5 cycles of Xeloda.  She is due to start cycle 6 on 12/29/2024.   We reduced the Xeloda to 1000 mg twice a day days 1 through 14 followed by 7 days off starting with cycle 5 on 12/8/2024.  We will proceed with cycle 6 with dose unchanged.  CBC from today was reviewed and is stable.  We will plan on a total of 8 cycles.    Hand-foot syndrome pain: Continue to use lotion frequently.  Can use tramadol as needed for pain.    We will continue monitoring with imaging given the questionable findings on her previous scans.  CT scan of chest, abdomen and pelvis and bone scan ordered for prior to return in 3 weeks.    Follow-up in 3 weeks with labs and scans prior to return                        Sally Arango APRN  [facility] Hematology and Oncology    12/27/2024          CC:

## 2024-12-31 ENCOUNTER — SPECIALTY PHARMACY (OUTPATIENT)
Facility: HOSPITAL | Age: 59
End: 2024-12-31
Payer: COMMERCIAL

## 2025-01-09 ENCOUNTER — HOSPITAL ENCOUNTER (OUTPATIENT)
Dept: NUCLEAR MEDICINE | Facility: HOSPITAL | Age: 60
Discharge: HOME | End: 2025-01-09
Payer: COMMERCIAL

## 2025-01-09 ENCOUNTER — HOSPITAL ENCOUNTER (OUTPATIENT)
Dept: CT IMAGING | Facility: HOSPITAL | Age: 60
Discharge: HOME | End: 2025-01-09
Admitting: NURSE PRACTITIONER
Payer: COMMERCIAL

## 2025-01-09 DIAGNOSIS — Z17.1 MALIGNANT NEOPLASM OF OVERLAPPING SITES OF LEFT BREAST IN FEMALE, ESTROGEN RECEPTOR NEGATIVE: ICD-10-CM

## 2025-01-09 DIAGNOSIS — C50.812 MALIGNANT NEOPLASM OF OVERLAPPING SITES OF LEFT BREAST IN FEMALE, ESTROGEN RECEPTOR NEGATIVE: ICD-10-CM

## 2025-01-09 PROCEDURE — A9503 TC99M MEDRONATE: HCPCS | Performed by: NURSE PRACTITIONER

## 2025-01-09 PROCEDURE — 78306 BONE IMAGING WHOLE BODY: CPT

## 2025-01-09 PROCEDURE — 71260 CT THORAX DX C+: CPT

## 2025-01-09 PROCEDURE — 34310000005 TECHNETIUM MEDRONATE KIT: Performed by: NURSE PRACTITIONER

## 2025-01-09 PROCEDURE — 74177 CT ABD & PELVIS W/CONTRAST: CPT

## 2025-01-09 PROCEDURE — 25510000001 IOPAMIDOL 61 % SOLUTION: Performed by: NURSE PRACTITIONER

## 2025-01-09 RX ORDER — IOPAMIDOL 612 MG/ML
85 INJECTION, SOLUTION INTRAVASCULAR
Status: COMPLETED | OUTPATIENT
Start: 2025-01-09 | End: 2025-01-09

## 2025-01-09 RX ORDER — TC 99M MEDRONATE 20 MG/10ML
26.8 INJECTION, POWDER, LYOPHILIZED, FOR SOLUTION INTRAVENOUS
Status: COMPLETED | OUTPATIENT
Start: 2025-01-09 | End: 2025-01-09

## 2025-01-09 RX ADMIN — TC 99M MEDRONATE 26.8 MILLICURIE: 20 INJECTION, POWDER, LYOPHILIZED, FOR SOLUTION INTRAVENOUS at 09:37

## 2025-01-09 RX ADMIN — IOPAMIDOL 85 ML: 612 INJECTION, SOLUTION INTRAVENOUS at 10:27

## 2025-01-15 ENCOUNTER — OFFICE VISIT (OUTPATIENT)
Dept: ONCOLOGY | Facility: CLINIC | Age: 60
End: 2025-01-15
Payer: COMMERCIAL

## 2025-01-15 ENCOUNTER — HOSPITAL ENCOUNTER (OUTPATIENT)
Dept: ONCOLOGY | Facility: HOSPITAL | Age: 60
Discharge: HOME | End: 2025-01-15
Admitting: NURSE PRACTITIONER
Payer: COMMERCIAL

## 2025-01-15 VITALS
WEIGHT: 131.4 LBS | BODY MASS INDEX: 21.89 KG/M2 | DIASTOLIC BLOOD PRESSURE: 78 MMHG | HEIGHT: 65 IN | SYSTOLIC BLOOD PRESSURE: 157 MMHG | TEMPERATURE: 97.7 F | RESPIRATION RATE: 16 BRPM | OXYGEN SATURATION: 99 % | HEART RATE: 143 BPM

## 2025-01-15 DIAGNOSIS — C50.812 MALIGNANT NEOPLASM OF OVERLAPPING SITES OF LEFT BREAST IN FEMALE, ESTROGEN RECEPTOR NEGATIVE: Primary | ICD-10-CM

## 2025-01-15 DIAGNOSIS — Z17.1 MALIGNANT NEOPLASM OF OVERLAPPING SITES OF LEFT BREAST IN FEMALE, ESTROGEN RECEPTOR NEGATIVE: Primary | ICD-10-CM

## 2025-01-15 LAB
ALBUMIN SERPL-MCNC: 4.4 G/DL (ref 3.5–5.2)
ALBUMIN/GLOB SERPL: 1.8 G/DL
ALP SERPL-CCNC: 80 U/L (ref 39–117)
ALT SERPL W P-5'-P-CCNC: 14 U/L (ref 1–33)
ANION GAP SERPL CALCULATED.3IONS-SCNC: 11 MMOL/L (ref 5–15)
AST SERPL-CCNC: 24 U/L (ref 1–32)
BASOPHILS # BLD AUTO: 0.03 10*3/MM3 (ref 0–0.2)
BASOPHILS NFR BLD AUTO: 0.5 % (ref 0–1.5)
BILIRUB SERPL-MCNC: 0.4 MG/DL (ref 0–1.2)
BUN SERPL-MCNC: 14 MG/DL (ref 6–20)
BUN/CREAT SERPL: 18.7 (ref 7–25)
CALCIUM SPEC-SCNC: 9.2 MG/DL (ref 8.6–10.5)
CHLORIDE SERPL-SCNC: 103 MMOL/L (ref 98–107)
CO2 SERPL-SCNC: 24 MMOL/L (ref 22–29)
CREAT SERPL-MCNC: 0.75 MG/DL (ref 0.57–1)
DEPRECATED RDW RBC AUTO: 62.2 FL (ref 37–54)
EGFRCR SERPLBLD CKD-EPI 2021: 93 ML/MIN/1.73
EOSINOPHIL # BLD AUTO: 0.09 10*3/MM3 (ref 0–0.4)
EOSINOPHIL NFR BLD AUTO: 1.5 % (ref 0.3–6.2)
ERYTHROCYTE [DISTWIDTH] IN BLOOD BY AUTOMATED COUNT: 14.8 % (ref 12.3–15.4)
GLOBULIN UR ELPH-MCNC: 2.4 GM/DL
GLUCOSE SERPL-MCNC: 125 MG/DL (ref 65–99)
HCT VFR BLD AUTO: 36.8 % (ref 34–46.6)
HGB BLD-MCNC: 12.9 G/DL (ref 12–15.9)
IMM GRANULOCYTES # BLD AUTO: 0.08 10*3/MM3 (ref 0–0.05)
IMM GRANULOCYTES NFR BLD AUTO: 1.4 % (ref 0–0.5)
LYMPHOCYTES # BLD AUTO: 2.02 10*3/MM3 (ref 0.7–3.1)
LYMPHOCYTES NFR BLD AUTO: 34.3 % (ref 19.6–45.3)
MCH RBC QN AUTO: 39.4 PG (ref 26.6–33)
MCHC RBC AUTO-ENTMCNC: 35.1 G/DL (ref 31.5–35.7)
MCV RBC AUTO: 112.5 FL (ref 79–97)
MONOCYTES # BLD AUTO: 0.51 10*3/MM3 (ref 0.1–0.9)
MONOCYTES NFR BLD AUTO: 8.7 % (ref 5–12)
NEUTROPHILS NFR BLD AUTO: 3.16 10*3/MM3 (ref 1.7–7)
NEUTROPHILS NFR BLD AUTO: 53.6 % (ref 42.7–76)
PLATELET # BLD AUTO: 144 10*3/MM3 (ref 140–450)
PMV BLD AUTO: 9.1 FL (ref 6–12)
POTASSIUM SERPL-SCNC: 3.7 MMOL/L (ref 3.5–5.2)
PROT SERPL-MCNC: 6.8 G/DL (ref 6–8.5)
RBC # BLD AUTO: 3.27 10*6/MM3 (ref 3.77–5.28)
SODIUM SERPL-SCNC: 138 MMOL/L (ref 136–145)
WBC NRBC COR # BLD AUTO: 5.89 10*3/MM3 (ref 3.4–10.8)

## 2025-01-15 PROCEDURE — 36591 DRAW BLOOD OFF VENOUS DEVICE: CPT

## 2025-01-15 PROCEDURE — 80053 COMPREHEN METABOLIC PANEL: CPT | Performed by: NURSE PRACTITIONER

## 2025-01-15 PROCEDURE — 85025 COMPLETE CBC W/AUTO DIFF WBC: CPT | Performed by: NURSE PRACTITIONER

## 2025-01-15 PROCEDURE — 25010000002 HEPARIN LOCK FLUSH PER 10 UNITS: Performed by: INTERNAL MEDICINE

## 2025-01-15 RX ORDER — HEPARIN SODIUM (PORCINE) LOCK FLUSH IV SOLN 100 UNIT/ML 100 UNIT/ML
500 SOLUTION INTRAVENOUS AS NEEDED
OUTPATIENT
Start: 2025-01-15

## 2025-01-15 RX ORDER — SODIUM CHLORIDE 0.9 % (FLUSH) 0.9 %
10 SYRINGE (ML) INJECTION AS NEEDED
OUTPATIENT
Start: 2025-01-15

## 2025-01-15 RX ORDER — HEPARIN SODIUM (PORCINE) LOCK FLUSH IV SOLN 100 UNIT/ML 100 UNIT/ML
500 SOLUTION INTRAVENOUS AS NEEDED
Status: DISCONTINUED | OUTPATIENT
Start: 2025-01-15 | End: 2025-01-16 | Stop reason: HOSPADM

## 2025-01-15 RX ADMIN — HEPARIN 500 UNITS: 100 SYRINGE at 13:17

## 2025-01-15 NOTE — PROGRESS NOTES
"      PROBLEM LIST:  [id number] triple negative (her2 0+) invasive ductal carcinoma of the left breast  Biopsy of a 1.2 cm left breast mass on 10/4/23.  Pathology showed a high grade IDC.  Neoadjuvant chemotherapy with ddAC followed by taxol started 11/9/23  Bilateral mastectomy on 5/14/24.  Pathology showed a grade 2 IDC measuring 1.1 cm.  0/2 SLN involved.  There was evidence of of response in the invasive carcinoma, and possibly in the lymph nodes.  [id number]  PET/CT 6/11/24 showed a hypermetabolic left lobe liver lesion, several small foci of uptake in normal appearing mediastinal and hilar LN, subtle uptake left C2 pedicle.  Subsequent bone scan 6/19/24 showed no suggestion of bone metastases.  Liver biopsy 6/21/24 showed normal hepatic tissue.  Repeat imaging 7/18/24 showed persistent uptake in the liver.  Laparoscopic surgery 8/23/24 showed no visible abnormality in the liver.  Falciform ligament nodule was biopsies which was benign.  Adjuvant Xeloda started 9/8/2024    Subjective     CHIEF COMPLAINT: breast cancer    HISTORY OF PRESENT ILLNESS:   Swathi Cain returns for follow-up.  We reduced capecitabine to 1000 mg twice a day days 1 through 14 followed by 7 days off starting with cycle 5.  She is tolerating the lower dose much better.  She still gets some irritation of her hands and feet but it is manageable.  Otherwise she is feeling pretty well.        Objective      /78   Pulse (!) 143   Temp 97.7 °F (36.5 °C)   Resp 16   Ht 165.1 cm (65\")   Wt 59.6 kg (131 lb 6.4 oz)   SpO2 99%   BMI 21.87 kg/m²   Vitals:    01/15/25 1323   PainSc: 0-No pain                               ECOG score: 0             General: well appearing female in no acute distress  Neuro: alert and oriented  HEENT: sclera anicteric  Extremeties: no lower extremity edema  Skin: no rashes, lesions, bruising, or petechiae.  Mild erythema on the palms/plantar surfaces   Psych: mood and affect appropriate            RECENT " LABS:  Lab Results   Component Value Date    WBC 5.89 01/15/2025    HGB 12.9 01/15/2025    HCT 36.8 01/15/2025    .5 (H) 01/15/2025     01/15/2025       Lab Results   Component Value Date    GLUCOSE 100 (H) 12/27/2024    BUN 12 12/27/2024    CREATININE 0.74 12/27/2024    BCR 16.2 12/27/2024    K 3.8 12/27/2024    CO2 24.0 12/27/2024    CALCIUM 9.7 12/27/2024    ALBUMIN 4.3 12/27/2024    AST 21 12/27/2024    ALT 16 12/27/2024       NM Bone Scan Whole Body  Narrative: DATE OF EXAM: 1/9/2025 9:39 AM EST    PROCEDURE: NM BONE SCAN WHOLE BODY    INDICATIONS: breast cancer, evaluation of treatment    COMPARISON: 10/16/2024    TECHNIQUE: The patient received 26.8 mCi of technetium 99m MDP intravenously and 3 hour delayed anterior and posterior whole body bone images were obtained.    FINDINGS:  Physiologic activity within the kidneys and urinary bladder. Mild para-articular areas of increased uptake related to degenerative arthrosis, similar prior examination. No foci of abnormal radiotracer uptake are identified.  Impression: No evidence of osseous metastatic disease.    Electronically Signed: Don Galvez MD    1/9/2025 3:24 PM EST    Workstation ID: [id number]  CT Abdomen Pelvis With Contrast  Narrative: CT ABDOMEN PELVIS W CONTRAST, CT CHEST W CONTRAST DIAGNOSTIC    Date of Exam: 1/9/2025 10:20 AM EST    Indication: followup  breast cancer, evaluation of treatment.    Comparison: CT abdomen pelvis dated 10/16/2024 and PET/CT dated 7/18/2024    Technique: Axial CT images were obtained of the chest abdomen and pelvis following the uneventful intravenous administration of intravenous contrast. Reconstructed coronal and sagittal images were also obtained. Automated exposure control and iterative   construction methods were used.    Findings:  CT chest: Hilum and Mediastinum: No pathologically enlarged lymph nodes.  Normal heart size.   No pericardial effusion.  Unremarkable thoracic aorta and pulmonary  arteries.    Lung Parenchyma and Pleura: No focal consolidations.  No suspicious pulmonary nodules.  No endobronchial lesions.  No significant pleural effusions.    Soft tissues: Unremarkable.right internal jugular chest port in place    Osseous structures: No aggressive focal lytic or sclerotic osseous lesions.    CT abdomen/pelvis: The previously seen low attenuating lesion in the left subcapsular hepatic lobe now demonstrates intense mildly heterogeneous enhancement on the arterial phase with isointensity to the liver parenchyma on the delayed phase. The lesion   appears fairly stable in size versus mildly enlarged measuring up to 1.5 cm (image 13 series 2). There are additional hypodense mildly enhancing lesion in the right hepatic lobe is stable and is compatible with a hemangioma. Additional tiny 5 mm   hypodense lesion in segment 4B (image 40 series 2) and 2 mm hypodense lesion in the right hepatic lobe segment 6 too small to characterize but stable and likely tiny cysts (image 49 series 2).    Small calcified gallstones    Spleen:Spleen is normal in size with multiple granulomas     Pancreas:   Pancreas shows homogeneous density. There is no evidence of pancreatic mass or peripancreatic fluid.    Kidneys: Kidneys are normal in size. There are no stones or hydronephrosis. Stable hypodense left adrenal nodule unremarkable. Measures 1.4 cm. Right adrenal gland is unremarkable    Retroperitoneal/Lymph Nodes/Vasculature: No retroperitoneal adenopathy is identified by size criteria.    Gastrointestinal/Mesentery: The bowel loops are non-dilated without definite wall thickening or mass. The appendix appears within normal limits. No evidence of obstruction. No free air.    Bladder: The bladder is unremarkable    No acute abnormalities in the pelvis      Bony Structures:  Visualized bony structures are consistent with the patient's age.  Impression: Impression:  1.No evidence of metastatic disease in the  chest.  2.The previously seen low attenuating lesion in the left hepatic lobe now demonstrates intense enhancement on the arterial phase with isodensity to the liver parenchyma on the delayed views. This enhancement pattern was not present on previous exams. The   appearance is nonspecific. Follow-up recommended  3.Few additional stable chronic ancillary findings as above    Electronically Signed: Neymar Bryant MD    1/9/2025 11:17 AM EST    Workstation ID: [id number]  CT Chest With Contrast Diagnostic  Narrative: CT ABDOMEN PELVIS W CONTRAST, CT CHEST W CONTRAST DIAGNOSTIC    Date of Exam: 1/9/2025 10:20 AM EST    Indication: followup  breast cancer, evaluation of treatment.    Comparison: CT abdomen pelvis dated 10/16/2024 and PET/CT dated 7/18/2024    Technique: Axial CT images were obtained of the chest abdomen and pelvis following the uneventful intravenous administration of intravenous contrast. Reconstructed coronal and sagittal images were also obtained. Automated exposure control and iterative   construction methods were used.    Findings:  CT chest: Hilum and Mediastinum: No pathologically enlarged lymph nodes.  Normal heart size.   No pericardial effusion.  Unremarkable thoracic aorta and pulmonary arteries.    Lung Parenchyma and Pleura: No focal consolidations.  No suspicious pulmonary nodules.  No endobronchial lesions.  No significant pleural effusions.    Soft tissues: Unremarkable.right internal jugular chest port in place    Osseous structures: No aggressive focal lytic or sclerotic osseous lesions.    CT abdomen/pelvis: The previously seen low attenuating lesion in the left subcapsular hepatic lobe now demonstrates intense mildly heterogeneous enhancement on the arterial phase with isointensity to the liver parenchyma on the delayed phase. The lesion   appears fairly stable in size versus mildly enlarged measuring up to 1.5 cm (image 13 series 2). There are additional hypodense mildly enhancing  lesion in the right hepatic lobe is stable and is compatible with a hemangioma. Additional tiny 5 mm   hypodense lesion in segment 4B (image 40 series 2) and 2 mm hypodense lesion in the right hepatic lobe segment 6 too small to characterize but stable and likely tiny cysts (image 49 series 2).    Small calcified gallstones    Spleen:Spleen is normal in size with multiple granulomas     Pancreas:   Pancreas shows homogeneous density. There is no evidence of pancreatic mass or peripancreatic fluid.    Kidneys: Kidneys are normal in size. There are no stones or hydronephrosis. Stable hypodense left adrenal nodule unremarkable. Measures 1.4 cm. Right adrenal gland is unremarkable    Retroperitoneal/Lymph Nodes/Vasculature: No retroperitoneal adenopathy is identified by size criteria.    Gastrointestinal/Mesentery: The bowel loops are non-dilated without definite wall thickening or mass. The appendix appears within normal limits. No evidence of obstruction. No free air.    Bladder: The bladder is unremarkable    No acute abnormalities in the pelvis      Bony Structures:  Visualized bony structures are consistent with the patient's age.  Impression: Impression:  1.No evidence of metastatic disease in the chest.  2.The previously seen low attenuating lesion in the left hepatic lobe now demonstrates intense enhancement on the arterial phase with isodensity to the liver parenchyma on the delayed views. This enhancement pattern was not present on previous exams. The   appearance is nonspecific. Follow-up recommended  3.Few additional stable chronic ancillary findings as above    Electronically Signed: Neymar Bryant MD    1/9/2025 11:17 AM EST    Workstation ID: [id number]    I personally reviewed the imaging studies        ASSESSMENT AND PLAN:     Swathi Cain is a 59 y.o. female with a T1 cN0 M0 triple negative invasive ductal carcinoma of the left breast.  PDL1 CPS < 1 on original resection.    She started adjuvant  Xeloda 9/8/2024.  She has completed 6 cycles of Xeloda.  We will proceed with cycle 7 at the current dose.  We will plan on a total of 8 cycles.    Hand-foot syndrome pain:  Can use tramadol as needed for pain.    We reviewed her imaging studies.  There is no evidence of disease progression.  She continues to have a lesion in the left lobe of the liver of uncertain significance.  I reviewed the imaging with radiology.  There is rapid filling of the lesion on the current scan which may be seen with a hemangioma, but this would typically not have increased activity on PET/CT.  It is reassuring that the MRI images did not show any obvious mass.  For now we will just plan to continue to monitor every 3 months..    Follow-up in 3 weeks with labs and prior to cycle 8 of her Xeloda.      Total time of patient care on day of service including time prior to, face to face with patient, and following visit spent in reviewing records, lab results, imaging studies, discussion with patient, and documentation/charting was > 40 minutes.                    Marga Arreola MD  [facility] Hematology and Oncology    1/15/2025          CC:

## 2025-01-16 ENCOUNTER — SPECIALTY PHARMACY (OUTPATIENT)
Dept: ONCOLOGY | Facility: HOSPITAL | Age: 60
End: 2025-01-16
Payer: COMMERCIAL

## 2025-02-05 ENCOUNTER — HOSPITAL ENCOUNTER (OUTPATIENT)
Dept: ONCOLOGY | Facility: HOSPITAL | Age: 60
Discharge: HOME | End: 2025-02-05
Admitting: INTERNAL MEDICINE
Payer: COMMERCIAL

## 2025-02-05 ENCOUNTER — SPECIALTY PHARMACY (OUTPATIENT)
Dept: ONCOLOGY | Facility: HOSPITAL | Age: 60
End: 2025-02-05
Payer: COMMERCIAL

## 2025-02-05 ENCOUNTER — OFFICE VISIT (OUTPATIENT)
Dept: ONCOLOGY | Facility: CLINIC | Age: 60
End: 2025-02-05
Payer: COMMERCIAL

## 2025-02-05 VITALS
RESPIRATION RATE: 16 BRPM | SYSTOLIC BLOOD PRESSURE: 151 MMHG | OXYGEN SATURATION: 100 % | HEIGHT: 65 IN | HEART RATE: 133 BPM | TEMPERATURE: 97.5 F | WEIGHT: 132.8 LBS | BODY MASS INDEX: 22.13 KG/M2 | DIASTOLIC BLOOD PRESSURE: 88 MMHG

## 2025-02-05 DIAGNOSIS — Z17.1 MALIGNANT NEOPLASM OF OVERLAPPING SITES OF LEFT BREAST IN FEMALE, ESTROGEN RECEPTOR NEGATIVE: Primary | ICD-10-CM

## 2025-02-05 DIAGNOSIS — C50.812 MALIGNANT NEOPLASM OF OVERLAPPING SITES OF LEFT BREAST IN FEMALE, ESTROGEN RECEPTOR NEGATIVE: Primary | ICD-10-CM

## 2025-02-05 LAB
ALBUMIN SERPL-MCNC: 4.5 G/DL (ref 3.5–5.2)
ALBUMIN/GLOB SERPL: 1.9 G/DL
ALP SERPL-CCNC: 79 U/L (ref 39–117)
ALT SERPL W P-5'-P-CCNC: 15 U/L (ref 1–33)
ANION GAP SERPL CALCULATED.3IONS-SCNC: 14 MMOL/L (ref 5–15)
AST SERPL-CCNC: 20 U/L (ref 1–32)
BASOPHILS # BLD AUTO: 0.02 10*3/MM3 (ref 0–0.2)
BASOPHILS NFR BLD AUTO: 0.4 % (ref 0–1.5)
BILIRUB SERPL-MCNC: 0.3 MG/DL (ref 0–1.2)
BUN SERPL-MCNC: 16 MG/DL (ref 6–20)
BUN/CREAT SERPL: 22.2 (ref 7–25)
CALCIUM SPEC-SCNC: 9.6 MG/DL (ref 8.6–10.5)
CHLORIDE SERPL-SCNC: 104 MMOL/L (ref 98–107)
CO2 SERPL-SCNC: 23 MMOL/L (ref 22–29)
CREAT SERPL-MCNC: 0.72 MG/DL (ref 0.57–1)
DEPRECATED RDW RBC AUTO: 58.7 FL (ref 37–54)
EGFRCR SERPLBLD CKD-EPI 2021: 97.7 ML/MIN/1.73
EOSINOPHIL # BLD AUTO: 0.11 10*3/MM3 (ref 0–0.4)
EOSINOPHIL NFR BLD AUTO: 2 % (ref 0.3–6.2)
ERYTHROCYTE [DISTWIDTH] IN BLOOD BY AUTOMATED COUNT: 14.3 % (ref 12.3–15.4)
GLOBULIN UR ELPH-MCNC: 2.4 GM/DL
GLUCOSE SERPL-MCNC: 103 MG/DL (ref 65–99)
HCT VFR BLD AUTO: 38.4 % (ref 34–46.6)
HGB BLD-MCNC: 13.4 G/DL (ref 12–15.9)
IMM GRANULOCYTES # BLD AUTO: 0 10*3/MM3 (ref 0–0.05)
IMM GRANULOCYTES NFR BLD AUTO: 0 % (ref 0–0.5)
LYMPHOCYTES # BLD AUTO: 2.12 10*3/MM3 (ref 0.7–3.1)
LYMPHOCYTES NFR BLD AUTO: 39.3 % (ref 19.6–45.3)
MCH RBC QN AUTO: 38.8 PG (ref 26.6–33)
MCHC RBC AUTO-ENTMCNC: 34.9 G/DL (ref 31.5–35.7)
MCV RBC AUTO: 111.3 FL (ref 79–97)
MONOCYTES # BLD AUTO: 0.58 10*3/MM3 (ref 0.1–0.9)
MONOCYTES NFR BLD AUTO: 10.7 % (ref 5–12)
NEUTROPHILS NFR BLD AUTO: 2.57 10*3/MM3 (ref 1.7–7)
NEUTROPHILS NFR BLD AUTO: 47.6 % (ref 42.7–76)
PLATELET # BLD AUTO: 125 10*3/MM3 (ref 140–450)
PMV BLD AUTO: 9 FL (ref 6–12)
POTASSIUM SERPL-SCNC: 3.9 MMOL/L (ref 3.5–5.2)
PROT SERPL-MCNC: 6.9 G/DL (ref 6–8.5)
RBC # BLD AUTO: 3.45 10*6/MM3 (ref 3.77–5.28)
SODIUM SERPL-SCNC: 141 MMOL/L (ref 136–145)
WBC NRBC COR # BLD AUTO: 5.4 10*3/MM3 (ref 3.4–10.8)

## 2025-02-05 PROCEDURE — 99214 OFFICE O/P EST MOD 30 MIN: CPT | Performed by: NURSE PRACTITIONER

## 2025-02-05 PROCEDURE — 85025 COMPLETE CBC W/AUTO DIFF WBC: CPT | Performed by: INTERNAL MEDICINE

## 2025-02-05 PROCEDURE — 25010000002 HEPARIN LOCK FLUSH PER 10 UNITS: Performed by: INTERNAL MEDICINE

## 2025-02-05 PROCEDURE — 80053 COMPREHEN METABOLIC PANEL: CPT | Performed by: INTERNAL MEDICINE

## 2025-02-05 PROCEDURE — 36591 DRAW BLOOD OFF VENOUS DEVICE: CPT

## 2025-02-05 RX ORDER — HEPARIN SODIUM (PORCINE) LOCK FLUSH IV SOLN 100 UNIT/ML 100 UNIT/ML
500 SOLUTION INTRAVENOUS AS NEEDED
Status: DISCONTINUED | OUTPATIENT
Start: 2025-02-05 | End: 2025-02-06 | Stop reason: HOSPADM

## 2025-02-05 RX ORDER — HEPARIN SODIUM (PORCINE) LOCK FLUSH IV SOLN 100 UNIT/ML 100 UNIT/ML
500 SOLUTION INTRAVENOUS AS NEEDED
OUTPATIENT
Start: 2025-02-05

## 2025-02-05 RX ORDER — SODIUM CHLORIDE 0.9 % (FLUSH) 0.9 %
10 SYRINGE (ML) INJECTION AS NEEDED
Status: DISCONTINUED | OUTPATIENT
Start: 2025-02-05 | End: 2025-02-06 | Stop reason: HOSPADM

## 2025-02-05 RX ORDER — SODIUM CHLORIDE 0.9 % (FLUSH) 0.9 %
10 SYRINGE (ML) INJECTION AS NEEDED
OUTPATIENT
Start: 2025-02-05

## 2025-02-05 RX ADMIN — HEPARIN 500 UNITS: 100 SYRINGE at 11:12

## 2025-02-05 RX ADMIN — Medication 10 ML: at 11:12

## 2025-02-05 NOTE — PROGRESS NOTES
"      PROBLEM LIST:  [id number] triple negative (her2 0+) invasive ductal carcinoma of the left breast  Biopsy of a 1.2 cm left breast mass on 10/4/23.  Pathology showed a high grade IDC.  Neoadjuvant chemotherapy with ddAC followed by taxol started 11/9/23  Bilateral mastectomy on 5/14/24.  Pathology showed a grade 2 IDC measuring 1.1 cm.  0/2 SLN involved.  There was evidence of of response in the invasive carcinoma, and possibly in the lymph nodes.  [id number]  PET/CT 6/11/24 showed a hypermetabolic left lobe liver lesion, several small foci of uptake in normal appearing mediastinal and hilar LN, subtle uptake left C2 pedicle.  Subsequent bone scan 6/19/24 showed no suggestion of bone metastases.  Liver biopsy 6/21/24 showed normal hepatic tissue.  Repeat imaging 7/18/24 showed persistent uptake in the liver.  Laparoscopic surgery 8/23/24 showed no visible abnormality in the liver.  Falciform ligament nodule was biopsies which was benign.  Adjuvant Xeloda started 9/8/2024    Subjective     CHIEF COMPLAINT: breast cancer    HISTORY OF PRESENT ILLNESS:   Swathi Cain returns for follow-up.  We reduced capecitabine to 1000 mg twice a day days 1 through 14 followed by 7 days off starting with cycle 5.  She is tolerating the lower dose much better.  She still has some irritation and redness on the bottoms of her hands and feet along with some mild cracking in the creases on her hands that is manageable.  She denies any new medical concerns.          Objective      /88   Pulse (!) 133   Temp 97.5 °F (36.4 °C) (Temporal)   Resp 16   Ht 165.1 cm (65\")   Wt 60.2 kg (132 lb 12.8 oz)   SpO2 100%   BMI 22.10 kg/m²   Vitals:    02/05/25 1127   PainSc: 0-No pain                                 ECOG score: 0             General: well appearing female in no acute distress  Neuro: alert and oriented  HEENT: sclera anicteric  Extremeties: no lower extremity edema  Skin: no rashes, lesions, bruising, or petechiae.  " Mild erythema on the palms/plantar surfaces   Psych: mood and affect appropriate            RECENT LABS:  Lab Results   Component Value Date    WBC 5.40 02/05/2025    HGB 13.4 02/05/2025    HCT 38.4 02/05/2025    .3 (H) 02/05/2025     (L) 02/05/2025       Lab Results   Component Value Date    GLUCOSE 125 (H) 01/15/2025    BUN 14 01/15/2025    CREATININE 0.75 01/15/2025    BCR 18.7 01/15/2025    K 3.7 01/15/2025    CO2 24.0 01/15/2025    CALCIUM 9.2 01/15/2025    ALBUMIN 4.4 01/15/2025    AST 24 01/15/2025    ALT 14 01/15/2025       NM Bone Scan Whole Body  Narrative: DATE OF EXAM: 1/9/2025 9:39 AM EST    PROCEDURE: NM BONE SCAN WHOLE BODY    INDICATIONS: breast cancer, evaluation of treatment    COMPARISON: 10/16/2024    TECHNIQUE: The patient received 26.8 mCi of technetium 99m MDP intravenously and 3 hour delayed anterior and posterior whole body bone images were obtained.    FINDINGS:  Physiologic activity within the kidneys and urinary bladder. Mild para-articular areas of increased uptake related to degenerative arthrosis, similar prior examination. No foci of abnormal radiotracer uptake are identified.  Impression: No evidence of osseous metastatic disease.    Electronically Signed: Don Galvez MD    1/9/2025 3:24 PM EST    Workstation ID: [id number]  CT Abdomen Pelvis With Contrast  Narrative: CT ABDOMEN PELVIS W CONTRAST, CT CHEST W CONTRAST DIAGNOSTIC    Date of Exam: 1/9/2025 10:20 AM EST    Indication: followup  breast cancer, evaluation of treatment.    Comparison: CT abdomen pelvis dated 10/16/2024 and PET/CT dated 7/18/2024    Technique: Axial CT images were obtained of the chest abdomen and pelvis following the uneventful intravenous administration of intravenous contrast. Reconstructed coronal and sagittal images were also obtained. Automated exposure control and iterative   construction methods were used.    Findings:  CT chest: Hilum and Mediastinum: No pathologically enlarged lymph  nodes.  Normal heart size.   No pericardial effusion.  Unremarkable thoracic aorta and pulmonary arteries.    Lung Parenchyma and Pleura: No focal consolidations.  No suspicious pulmonary nodules.  No endobronchial lesions.  No significant pleural effusions.    Soft tissues: Unremarkable.right internal jugular chest port in place    Osseous structures: No aggressive focal lytic or sclerotic osseous lesions.    CT abdomen/pelvis: The previously seen low attenuating lesion in the left subcapsular hepatic lobe now demonstrates intense mildly heterogeneous enhancement on the arterial phase with isointensity to the liver parenchyma on the delayed phase. The lesion   appears fairly stable in size versus mildly enlarged measuring up to 1.5 cm (image 13 series 2). There are additional hypodense mildly enhancing lesion in the right hepatic lobe is stable and is compatible with a hemangioma. Additional tiny 5 mm   hypodense lesion in segment 4B (image 40 series 2) and 2 mm hypodense lesion in the right hepatic lobe segment 6 too small to characterize but stable and likely tiny cysts (image 49 series 2).    Small calcified gallstones    Spleen:Spleen is normal in size with multiple granulomas     Pancreas:   Pancreas shows homogeneous density. There is no evidence of pancreatic mass or peripancreatic fluid.    Kidneys: Kidneys are normal in size. There are no stones or hydronephrosis. Stable hypodense left adrenal nodule unremarkable. Measures 1.4 cm. Right adrenal gland is unremarkable    Retroperitoneal/Lymph Nodes/Vasculature: No retroperitoneal adenopathy is identified by size criteria.    Gastrointestinal/Mesentery: The bowel loops are non-dilated without definite wall thickening or mass. The appendix appears within normal limits. No evidence of obstruction. No free air.    Bladder: The bladder is unremarkable    No acute abnormalities in the pelvis      Bony Structures:  Visualized bony structures are consistent with the  patient's age.  Impression: Impression:  1.No evidence of metastatic disease in the chest.  2.The previously seen low attenuating lesion in the left hepatic lobe now demonstrates intense enhancement on the arterial phase with isodensity to the liver parenchyma on the delayed views. This enhancement pattern was not present on previous exams. The   appearance is nonspecific. Follow-up recommended  3.Few additional stable chronic ancillary findings as above    Electronically Signed: Neymar Bryant MD    1/9/2025 11:17 AM EST    Workstation ID: [id number]  CT Chest With Contrast Diagnostic  Narrative: CT ABDOMEN PELVIS W CONTRAST, CT CHEST W CONTRAST DIAGNOSTIC    Date of Exam: 1/9/2025 10:20 AM EST    Indication: followup  breast cancer, evaluation of treatment.    Comparison: CT abdomen pelvis dated 10/16/2024 and PET/CT dated 7/18/2024    Technique: Axial CT images were obtained of the chest abdomen and pelvis following the uneventful intravenous administration of intravenous contrast. Reconstructed coronal and sagittal images were also obtained. Automated exposure control and iterative   construction methods were used.    Findings:  CT chest: Hilum and Mediastinum: No pathologically enlarged lymph nodes.  Normal heart size.   No pericardial effusion.  Unremarkable thoracic aorta and pulmonary arteries.    Lung Parenchyma and Pleura: No focal consolidations.  No suspicious pulmonary nodules.  No endobronchial lesions.  No significant pleural effusions.    Soft tissues: Unremarkable.right internal jugular chest port in place    Osseous structures: No aggressive focal lytic or sclerotic osseous lesions.    CT abdomen/pelvis: The previously seen low attenuating lesion in the left subcapsular hepatic lobe now demonstrates intense mildly heterogeneous enhancement on the arterial phase with isointensity to the liver parenchyma on the delayed phase. The lesion   appears fairly stable in size versus mildly enlarged measuring  up to 1.5 cm (image 13 series 2). There are additional hypodense mildly enhancing lesion in the right hepatic lobe is stable and is compatible with a hemangioma. Additional tiny 5 mm   hypodense lesion in segment 4B (image 40 series 2) and 2 mm hypodense lesion in the right hepatic lobe segment 6 too small to characterize but stable and likely tiny cysts (image 49 series 2).    Small calcified gallstones    Spleen:Spleen is normal in size with multiple granulomas     Pancreas:   Pancreas shows homogeneous density. There is no evidence of pancreatic mass or peripancreatic fluid.    Kidneys: Kidneys are normal in size. There are no stones or hydronephrosis. Stable hypodense left adrenal nodule unremarkable. Measures 1.4 cm. Right adrenal gland is unremarkable    Retroperitoneal/Lymph Nodes/Vasculature: No retroperitoneal adenopathy is identified by size criteria.    Gastrointestinal/Mesentery: The bowel loops are non-dilated without definite wall thickening or mass. The appendix appears within normal limits. No evidence of obstruction. No free air.    Bladder: The bladder is unremarkable    No acute abnormalities in the pelvis      Bony Structures:  Visualized bony structures are consistent with the patient's age.  Impression: Impression:  1.No evidence of metastatic disease in the chest.  2.The previously seen low attenuating lesion in the left hepatic lobe now demonstrates intense enhancement on the arterial phase with isodensity to the liver parenchyma on the delayed views. This enhancement pattern was not present on previous exams. The   appearance is nonspecific. Follow-up recommended  3.Few additional stable chronic ancillary findings as above    Electronically Signed: Neymar Bryant MD    1/9/2025 11:17 AM EST    Workstation ID: [id number]            ASSESSMENT AND PLAN:     Swathi Cain is a 59 y.o. female with a T1 cN0 M0 triple negative invasive ductal carcinoma of the left breast.  PDL1 CPS < 1 on  original resection.    She started adjuvant Xeloda 9/8/2024.  She has completed 7 cycles of Xeloda.  We will proceed with cycle 8 at the current dose.  She is due to start cycle 8 on 2/9/2025.  We will plan on a total of 8 cycles.  She will be due for repeat scans middle of April 2025.  Orders placed today. CBC from today was reviewed and is stable. Will continue to monitor.     Hand-foot syndrome pain:  Can use tramadol as needed for pain.    Follow-up in 1 month with labs and port flush.                    Sally Arango APRN  [facility]    2/5/2025          CC:

## 2025-03-05 ENCOUNTER — OFFICE VISIT (OUTPATIENT)
Dept: ONCOLOGY | Facility: CLINIC | Age: 60
End: 2025-03-05
Payer: COMMERCIAL

## 2025-03-05 ENCOUNTER — HOSPITAL ENCOUNTER (OUTPATIENT)
Dept: ONCOLOGY | Facility: HOSPITAL | Age: 60
Discharge: HOME | End: 2025-03-05
Admitting: INTERNAL MEDICINE
Payer: COMMERCIAL

## 2025-03-05 ENCOUNTER — SPECIALTY PHARMACY (OUTPATIENT)
Dept: ONCOLOGY | Facility: HOSPITAL | Age: 60
End: 2025-03-05
Payer: COMMERCIAL

## 2025-03-05 VITALS
OXYGEN SATURATION: 96 % | BODY MASS INDEX: 22.49 KG/M2 | WEIGHT: 135 LBS | RESPIRATION RATE: 18 BRPM | HEIGHT: 65 IN | HEART RATE: 120 BPM | TEMPERATURE: 97.6 F | SYSTOLIC BLOOD PRESSURE: 150 MMHG | DIASTOLIC BLOOD PRESSURE: 81 MMHG

## 2025-03-05 DIAGNOSIS — Z17.1 MALIGNANT NEOPLASM OF OVERLAPPING SITES OF LEFT BREAST IN FEMALE, ESTROGEN RECEPTOR NEGATIVE: Primary | ICD-10-CM

## 2025-03-05 DIAGNOSIS — C50.812 MALIGNANT NEOPLASM OF OVERLAPPING SITES OF LEFT BREAST IN FEMALE, ESTROGEN RECEPTOR NEGATIVE: Primary | ICD-10-CM

## 2025-03-05 LAB
ALBUMIN SERPL-MCNC: 4.2 G/DL (ref 3.5–5.2)
ALBUMIN/GLOB SERPL: 2 G/DL
ALP SERPL-CCNC: 82 U/L (ref 39–117)
ALT SERPL W P-5'-P-CCNC: 15 U/L (ref 1–33)
ANION GAP SERPL CALCULATED.3IONS-SCNC: 12 MMOL/L (ref 5–15)
AST SERPL-CCNC: 26 U/L (ref 1–32)
BASOPHILS # BLD AUTO: 0.02 10*3/MM3 (ref 0–0.2)
BASOPHILS NFR BLD AUTO: 0.4 % (ref 0–1.5)
BILIRUB SERPL-MCNC: 0.3 MG/DL (ref 0–1.2)
BUN SERPL-MCNC: 12 MG/DL (ref 6–20)
BUN/CREAT SERPL: 15.8 (ref 7–25)
CALCIUM SPEC-SCNC: 9 MG/DL (ref 8.6–10.5)
CHLORIDE SERPL-SCNC: 105 MMOL/L (ref 98–107)
CO2 SERPL-SCNC: 21 MMOL/L (ref 22–29)
CREAT SERPL-MCNC: 0.76 MG/DL (ref 0.57–1)
DEPRECATED RDW RBC AUTO: 59 FL (ref 37–54)
EGFRCR SERPLBLD CKD-EPI 2021: 91.5 ML/MIN/1.73
EOSINOPHIL # BLD AUTO: 0.12 10*3/MM3 (ref 0–0.4)
EOSINOPHIL NFR BLD AUTO: 2.5 % (ref 0.3–6.2)
ERYTHROCYTE [DISTWIDTH] IN BLOOD BY AUTOMATED COUNT: 13.9 % (ref 12.3–15.4)
GLOBULIN UR ELPH-MCNC: 2.1 GM/DL
GLUCOSE SERPL-MCNC: 97 MG/DL (ref 65–99)
HCT VFR BLD AUTO: 37.7 % (ref 34–46.6)
HGB BLD-MCNC: 12.9 G/DL (ref 12–15.9)
IMM GRANULOCYTES # BLD AUTO: 0.01 10*3/MM3 (ref 0–0.05)
IMM GRANULOCYTES NFR BLD AUTO: 0.2 % (ref 0–0.5)
LYMPHOCYTES # BLD AUTO: 1.67 10*3/MM3 (ref 0.7–3.1)
LYMPHOCYTES NFR BLD AUTO: 34.5 % (ref 19.6–45.3)
MCH RBC QN AUTO: 38.2 PG (ref 26.6–33)
MCHC RBC AUTO-ENTMCNC: 34.2 G/DL (ref 31.5–35.7)
MCV RBC AUTO: 111.5 FL (ref 79–97)
MONOCYTES # BLD AUTO: 0.45 10*3/MM3 (ref 0.1–0.9)
MONOCYTES NFR BLD AUTO: 9.3 % (ref 5–12)
NEUTROPHILS NFR BLD AUTO: 2.57 10*3/MM3 (ref 1.7–7)
NEUTROPHILS NFR BLD AUTO: 53.1 % (ref 42.7–76)
PLATELET # BLD AUTO: 103 10*3/MM3 (ref 140–450)
PMV BLD AUTO: 9.2 FL (ref 6–12)
POTASSIUM SERPL-SCNC: 4 MMOL/L (ref 3.5–5.2)
PROT SERPL-MCNC: 6.3 G/DL (ref 6–8.5)
RBC # BLD AUTO: 3.38 10*6/MM3 (ref 3.77–5.28)
SODIUM SERPL-SCNC: 138 MMOL/L (ref 136–145)
WBC NRBC COR # BLD AUTO: 4.84 10*3/MM3 (ref 3.4–10.8)

## 2025-03-05 PROCEDURE — 36415 COLL VENOUS BLD VENIPUNCTURE: CPT

## 2025-03-05 PROCEDURE — 96523 IRRIG DRUG DELIVERY DEVICE: CPT

## 2025-03-05 PROCEDURE — 85025 COMPLETE CBC W/AUTO DIFF WBC: CPT | Performed by: NURSE PRACTITIONER

## 2025-03-05 PROCEDURE — 99213 OFFICE O/P EST LOW 20 MIN: CPT | Performed by: NURSE PRACTITIONER

## 2025-03-05 PROCEDURE — 25010000002 HEPARIN LOCK FLUSH PER 10 UNITS: Performed by: INTERNAL MEDICINE

## 2025-03-05 PROCEDURE — G0463 HOSPITAL OUTPT CLINIC VISIT: HCPCS

## 2025-03-05 PROCEDURE — 80053 COMPREHEN METABOLIC PANEL: CPT | Performed by: NURSE PRACTITIONER

## 2025-03-05 RX ORDER — HEPARIN SODIUM (PORCINE) LOCK FLUSH IV SOLN 100 UNIT/ML 100 UNIT/ML
500 SOLUTION INTRAVENOUS AS NEEDED
Status: DISCONTINUED | OUTPATIENT
Start: 2025-03-05 | End: 2025-03-06 | Stop reason: HOSPADM

## 2025-03-05 RX ORDER — SODIUM CHLORIDE 0.9 % (FLUSH) 0.9 %
10 SYRINGE (ML) INJECTION AS NEEDED
OUTPATIENT
Start: 2025-03-05

## 2025-03-05 RX ORDER — HEPARIN SODIUM (PORCINE) LOCK FLUSH IV SOLN 100 UNIT/ML 100 UNIT/ML
500 SOLUTION INTRAVENOUS AS NEEDED
OUTPATIENT
Start: 2025-03-05

## 2025-03-05 RX ADMIN — HEPARIN 500 UNITS: 100 SYRINGE at 10:29

## 2025-03-05 NOTE — PROGRESS NOTES
"      PROBLEM LIST:  [id number] triple negative (her2 0+) invasive ductal carcinoma of the left breast  Biopsy of a 1.2 cm left breast mass on 10/4/23.  Pathology showed a high grade IDC.  Neoadjuvant chemotherapy with ddAC followed by taxol started 11/9/23  Bilateral mastectomy on 5/14/24.  Pathology showed a grade 2 IDC measuring 1.1 cm.  0/2 SLN involved.  There was evidence of of response in the invasive carcinoma, and possibly in the lymph nodes.  [id number]  PET/CT 6/11/24 showed a hypermetabolic left lobe liver lesion, several small foci of uptake in normal appearing mediastinal and hilar LN, subtle uptake left C2 pedicle.  Subsequent bone scan 6/19/24 showed no suggestion of bone metastases.  Liver biopsy 6/21/24 showed normal hepatic tissue.  Repeat imaging 7/18/24 showed persistent uptake in the liver.  Laparoscopic surgery 8/23/24 showed no visible abnormality in the liver.  Falciform ligament nodule was biopsies which was benign.  Adjuvant Xeloda started 9/8/2024    Subjective     CHIEF COMPLAINT: breast cancer    HISTORY OF PRESENT ILLNESS:   Swathi Cain returns for follow-up.  She completed 8 cycles of adjuvant Xeloda 2/22/2025. She is doing well overall with no new medical concerns. She and her  were able to go to [place] for vacation about 2 weeks ago.             Objective      /81   Pulse 120   Temp 97.6 °F (36.4 °C)   Resp 18   Ht 165.1 cm (65\")   Wt 61.2 kg (135 lb)   SpO2 96%   BMI 22.47 kg/m²   Vitals:    03/05/25 1043   PainSc: 0-No pain                                 ECOG score: 0             General: well appearing female in no acute distress  Neuro: alert and oriented  HEENT: sclera anicteric  Extremeties: no lower extremity edema  Skin: no rashes, lesions, bruising, or petechiae.  Mild erythema on the palms/plantar surfaces   Psych: mood and affect appropriate            RECENT LABS:  Lab Results   Component Value Date    WBC 4.84 03/05/2025    HGB 12.9 03/05/2025 "    HCT 37.7 03/05/2025    .5 (H) 03/05/2025     (L) 03/05/2025       Lab Results   Component Value Date    GLUCOSE 103 (H) 02/05/2025    BUN 16 02/05/2025    CREATININE 0.72 02/05/2025    BCR 22.2 02/05/2025    K 3.9 02/05/2025    CO2 23.0 02/05/2025    CALCIUM 9.6 02/05/2025    ALBUMIN 4.5 02/05/2025    AST 20 02/05/2025    ALT 15 02/05/2025       NM Bone Scan Whole Body  Narrative: DATE OF EXAM: 1/9/2025 9:39 AM EST    PROCEDURE: NM BONE SCAN WHOLE BODY    INDICATIONS: breast cancer, evaluation of treatment    COMPARISON: 10/16/2024    TECHNIQUE: The patient received 26.8 mCi of technetium 99m MDP intravenously and 3 hour delayed anterior and posterior whole body bone images were obtained.    FINDINGS:  Physiologic activity within the kidneys and urinary bladder. Mild para-articular areas of increased uptake related to degenerative arthrosis, similar prior examination. No foci of abnormal radiotracer uptake are identified.  Impression: No evidence of osseous metastatic disease.    Electronically Signed: Don Galvez MD    1/9/2025 3:24 PM EST    Workstation ID: [id number]  CT Abdomen Pelvis With Contrast  Narrative: CT ABDOMEN PELVIS W CONTRAST, CT CHEST W CONTRAST DIAGNOSTIC    Date of Exam: 1/9/2025 10:20 AM EST    Indication: followup  breast cancer, evaluation of treatment.    Comparison: CT abdomen pelvis dated 10/16/2024 and PET/CT dated 7/18/2024    Technique: Axial CT images were obtained of the chest abdomen and pelvis following the uneventful intravenous administration of intravenous contrast. Reconstructed coronal and sagittal images were also obtained. Automated exposure control and iterative   construction methods were used.    Findings:  CT chest: Hilum and Mediastinum: No pathologically enlarged lymph nodes.  Normal heart size.   No pericardial effusion.  Unremarkable thoracic aorta and pulmonary arteries.    Lung Parenchyma and Pleura: No focal consolidations.  No suspicious pulmonary  nodules.  No endobronchial lesions.  No significant pleural effusions.    Soft tissues: Unremarkable.right internal jugular chest port in place    Osseous structures: No aggressive focal lytic or sclerotic osseous lesions.    CT abdomen/pelvis: The previously seen low attenuating lesion in the left subcapsular hepatic lobe now demonstrates intense mildly heterogeneous enhancement on the arterial phase with isointensity to the liver parenchyma on the delayed phase. The lesion   appears fairly stable in size versus mildly enlarged measuring up to 1.5 cm (image 13 series 2). There are additional hypodense mildly enhancing lesion in the right hepatic lobe is stable and is compatible with a hemangioma. Additional tiny 5 mm   hypodense lesion in segment 4B (image 40 series 2) and 2 mm hypodense lesion in the right hepatic lobe segment 6 too small to characterize but stable and likely tiny cysts (image 49 series 2).    Small calcified gallstones    Spleen:Spleen is normal in size with multiple granulomas     Pancreas:   Pancreas shows homogeneous density. There is no evidence of pancreatic mass or peripancreatic fluid.    Kidneys: Kidneys are normal in size. There are no stones or hydronephrosis. Stable hypodense left adrenal nodule unremarkable. Measures 1.4 cm. Right adrenal gland is unremarkable    Retroperitoneal/Lymph Nodes/Vasculature: No retroperitoneal adenopathy is identified by size criteria.    Gastrointestinal/Mesentery: The bowel loops are non-dilated without definite wall thickening or mass. The appendix appears within normal limits. No evidence of obstruction. No free air.    Bladder: The bladder is unremarkable    No acute abnormalities in the pelvis      Bony Structures:  Visualized bony structures are consistent with the patient's age.  Impression: Impression:  1.No evidence of metastatic disease in the chest.  2.The previously seen low attenuating lesion in the left hepatic lobe now demonstrates intense  enhancement on the arterial phase with isodensity to the liver parenchyma on the delayed views. This enhancement pattern was not present on previous exams. The   appearance is nonspecific. Follow-up recommended  3.Few additional stable chronic ancillary findings as above    Electronically Signed: Neymar Bryant MD    1/9/2025 11:17 AM EST    Workstation ID: [id number]  CT Chest With Contrast Diagnostic  Narrative: CT ABDOMEN PELVIS W CONTRAST, CT CHEST W CONTRAST DIAGNOSTIC    Date of Exam: 1/9/2025 10:20 AM EST    Indication: followup  breast cancer, evaluation of treatment.    Comparison: CT abdomen pelvis dated 10/16/2024 and PET/CT dated 7/18/2024    Technique: Axial CT images were obtained of the chest abdomen and pelvis following the uneventful intravenous administration of intravenous contrast. Reconstructed coronal and sagittal images were also obtained. Automated exposure control and iterative   construction methods were used.    Findings:  CT chest: Hilum and Mediastinum: No pathologically enlarged lymph nodes.  Normal heart size.   No pericardial effusion.  Unremarkable thoracic aorta and pulmonary arteries.    Lung Parenchyma and Pleura: No focal consolidations.  No suspicious pulmonary nodules.  No endobronchial lesions.  No significant pleural effusions.    Soft tissues: Unremarkable.right internal jugular chest port in place    Osseous structures: No aggressive focal lytic or sclerotic osseous lesions.    CT abdomen/pelvis: The previously seen low attenuating lesion in the left subcapsular hepatic lobe now demonstrates intense mildly heterogeneous enhancement on the arterial phase with isointensity to the liver parenchyma on the delayed phase. The lesion   appears fairly stable in size versus mildly enlarged measuring up to 1.5 cm (image 13 series 2). There are additional hypodense mildly enhancing lesion in the right hepatic lobe is stable and is compatible with a hemangioma. Additional tiny 5 mm    hypodense lesion in segment 4B (image 40 series 2) and 2 mm hypodense lesion in the right hepatic lobe segment 6 too small to characterize but stable and likely tiny cysts (image 49 series 2).    Small calcified gallstones    Spleen:Spleen is normal in size with multiple granulomas     Pancreas:   Pancreas shows homogeneous density. There is no evidence of pancreatic mass or peripancreatic fluid.    Kidneys: Kidneys are normal in size. There are no stones or hydronephrosis. Stable hypodense left adrenal nodule unremarkable. Measures 1.4 cm. Right adrenal gland is unremarkable    Retroperitoneal/Lymph Nodes/Vasculature: No retroperitoneal adenopathy is identified by size criteria.    Gastrointestinal/Mesentery: The bowel loops are non-dilated without definite wall thickening or mass. The appendix appears within normal limits. No evidence of obstruction. No free air.    Bladder: The bladder is unremarkable    No acute abnormalities in the pelvis      Bony Structures:  Visualized bony structures are consistent with the patient's age.  Impression: Impression:  1.No evidence of metastatic disease in the chest.  2.The previously seen low attenuating lesion in the left hepatic lobe now demonstrates intense enhancement on the arterial phase with isodensity to the liver parenchyma on the delayed views. This enhancement pattern was not present on previous exams. The   appearance is nonspecific. Follow-up recommended  3.Few additional stable chronic ancillary findings as above    Electronically Signed: Neymar Bryant MD    1/9/2025 11:17 AM EST    Workstation ID: [id number]            ASSESSMENT AND PLAN:     Swathi Cain is a 59 y.o. female with a T1 cN0 M0 triple negative invasive ductal carcinoma of the left breast.  PDL1 CPS < 1 on original resection.    She started adjuvant Xeloda 9/8/2024.  She has completed 8 cycles of Xeloda 2/22/2025. Clinically she is doing well. CBC from today was reviewed and is stable. CMP  is pending. We will plan on repeat CT and bone scans prior to return in 6 weeks.     Follow-up in 6 weeks with scans and labs on return.                     Sally Arango APRN  [facility]    3/5/2025          CC:

## 2025-04-09 ENCOUNTER — HOSPITAL ENCOUNTER (OUTPATIENT)
Dept: NUCLEAR MEDICINE | Facility: HOSPITAL | Age: 60
Discharge: HOME | End: 2025-04-09
Payer: COMMERCIAL

## 2025-04-09 ENCOUNTER — HOSPITAL ENCOUNTER (OUTPATIENT)
Dept: CT IMAGING | Facility: HOSPITAL | Age: 60
Discharge: HOME | End: 2025-04-09
Admitting: NURSE PRACTITIONER
Payer: COMMERCIAL

## 2025-04-09 DIAGNOSIS — C50.812 MALIGNANT NEOPLASM OF OVERLAPPING SITES OF LEFT BREAST IN FEMALE, ESTROGEN RECEPTOR NEGATIVE: ICD-10-CM

## 2025-04-09 DIAGNOSIS — Z17.1 MALIGNANT NEOPLASM OF OVERLAPPING SITES OF LEFT BREAST IN FEMALE, ESTROGEN RECEPTOR NEGATIVE: ICD-10-CM

## 2025-04-09 PROCEDURE — 78306 BONE IMAGING WHOLE BODY: CPT

## 2025-04-09 PROCEDURE — 34310000005 TECHNETIUM MEDRONATE KIT: Performed by: NURSE PRACTITIONER

## 2025-04-09 PROCEDURE — 71260 CT THORAX DX C+: CPT

## 2025-04-09 PROCEDURE — A9503 TC99M MEDRONATE: HCPCS | Performed by: NURSE PRACTITIONER

## 2025-04-09 PROCEDURE — 25510000001 IOPAMIDOL 61 % SOLUTION: Performed by: NURSE PRACTITIONER

## 2025-04-09 PROCEDURE — 74177 CT ABD & PELVIS W/CONTRAST: CPT

## 2025-04-09 RX ORDER — IOPAMIDOL 612 MG/ML
100 INJECTION, SOLUTION INTRAVASCULAR
Status: COMPLETED | OUTPATIENT
Start: 2025-04-09 | End: 2025-04-09

## 2025-04-09 RX ORDER — TC 99M MEDRONATE 20 MG/10ML
26.6 INJECTION, POWDER, LYOPHILIZED, FOR SOLUTION INTRAVENOUS
Status: COMPLETED | OUTPATIENT
Start: 2025-04-09 | End: 2025-04-09

## 2025-04-09 RX ADMIN — IOPAMIDOL 90 ML: 612 INJECTION, SOLUTION INTRAVENOUS at 11:33

## 2025-04-09 RX ADMIN — TC 99M MEDRONATE 26.6 MILLICURIE: 20 INJECTION, POWDER, LYOPHILIZED, FOR SOLUTION INTRAVENOUS at 10:20

## 2025-04-16 ENCOUNTER — OFFICE VISIT (OUTPATIENT)
Dept: ONCOLOGY | Facility: CLINIC | Age: 60
End: 2025-04-16
Payer: COMMERCIAL

## 2025-04-16 ENCOUNTER — HOSPITAL ENCOUNTER (OUTPATIENT)
Dept: ONCOLOGY | Facility: HOSPITAL | Age: 60
Discharge: HOME | End: 2025-04-16
Admitting: INTERNAL MEDICINE
Payer: COMMERCIAL

## 2025-04-16 VITALS
BODY MASS INDEX: 22.33 KG/M2 | SYSTOLIC BLOOD PRESSURE: 152 MMHG | OXYGEN SATURATION: 100 % | WEIGHT: 134 LBS | RESPIRATION RATE: 18 BRPM | HEART RATE: 130 BPM | HEIGHT: 65 IN | TEMPERATURE: 97.3 F | DIASTOLIC BLOOD PRESSURE: 77 MMHG

## 2025-04-16 DIAGNOSIS — Z17.1 MALIGNANT NEOPLASM OF OVERLAPPING SITES OF LEFT BREAST IN FEMALE, ESTROGEN RECEPTOR NEGATIVE: Primary | ICD-10-CM

## 2025-04-16 DIAGNOSIS — C50.812 MALIGNANT NEOPLASM OF OVERLAPPING SITES OF LEFT BREAST IN FEMALE, ESTROGEN RECEPTOR NEGATIVE: Primary | ICD-10-CM

## 2025-04-16 LAB
ALBUMIN SERPL-MCNC: 4.5 G/DL (ref 3.5–5.2)
ALBUMIN/GLOB SERPL: 1.6 G/DL
ALP SERPL-CCNC: 93 U/L (ref 39–117)
ALT SERPL W P-5'-P-CCNC: 12 U/L (ref 1–33)
ANION GAP SERPL CALCULATED.3IONS-SCNC: 12 MMOL/L (ref 5–15)
AST SERPL-CCNC: 22 U/L (ref 1–32)
BASOPHILS # BLD AUTO: 0.01 10*3/MM3 (ref 0–0.2)
BASOPHILS NFR BLD AUTO: 0.2 % (ref 0–1.5)
BILIRUB SERPL-MCNC: 0.3 MG/DL (ref 0–1.2)
BUN SERPL-MCNC: 12 MG/DL (ref 6–20)
BUN/CREAT SERPL: 15.6 (ref 7–25)
CALCIUM SPEC-SCNC: 9.9 MG/DL (ref 8.6–10.5)
CHLORIDE SERPL-SCNC: 105 MMOL/L (ref 98–107)
CO2 SERPL-SCNC: 22 MMOL/L (ref 22–29)
CREAT SERPL-MCNC: 0.77 MG/DL (ref 0.57–1)
DEPRECATED RDW RBC AUTO: 47.5 FL (ref 37–54)
EGFRCR SERPLBLD CKD-EPI 2021: 90.1 ML/MIN/1.73
EOSINOPHIL # BLD AUTO: 0.09 10*3/MM3 (ref 0–0.4)
EOSINOPHIL NFR BLD AUTO: 1.8 % (ref 0.3–6.2)
ERYTHROCYTE [DISTWIDTH] IN BLOOD BY AUTOMATED COUNT: 11.7 % (ref 12.3–15.4)
GLOBULIN UR ELPH-MCNC: 2.9 GM/DL
GLUCOSE SERPL-MCNC: 116 MG/DL (ref 65–99)
HCT VFR BLD AUTO: 41.4 % (ref 34–46.6)
HGB BLD-MCNC: 14.1 G/DL (ref 12–15.9)
IMM GRANULOCYTES # BLD AUTO: 0 10*3/MM3 (ref 0–0.05)
IMM GRANULOCYTES NFR BLD AUTO: 0 % (ref 0–0.5)
LYMPHOCYTES # BLD AUTO: 2.01 10*3/MM3 (ref 0.7–3.1)
LYMPHOCYTES NFR BLD AUTO: 40.4 % (ref 19.6–45.3)
MCH RBC QN AUTO: 36.7 PG (ref 26.6–33)
MCHC RBC AUTO-ENTMCNC: 34.1 G/DL (ref 31.5–35.7)
MCV RBC AUTO: 107.8 FL (ref 79–97)
MONOCYTES # BLD AUTO: 0.41 10*3/MM3 (ref 0.1–0.9)
MONOCYTES NFR BLD AUTO: 8.2 % (ref 5–12)
NEUTROPHILS NFR BLD AUTO: 2.45 10*3/MM3 (ref 1.7–7)
NEUTROPHILS NFR BLD AUTO: 49.4 % (ref 42.7–76)
PLATELET # BLD AUTO: 140 10*3/MM3 (ref 140–450)
PMV BLD AUTO: 8.8 FL (ref 6–12)
POTASSIUM SERPL-SCNC: 4 MMOL/L (ref 3.5–5.2)
PROT SERPL-MCNC: 7.4 G/DL (ref 6–8.5)
RBC # BLD AUTO: 3.84 10*6/MM3 (ref 3.77–5.28)
SODIUM SERPL-SCNC: 139 MMOL/L (ref 136–145)
WBC NRBC COR # BLD AUTO: 4.97 10*3/MM3 (ref 3.4–10.8)

## 2025-04-16 PROCEDURE — 99214 OFFICE O/P EST MOD 30 MIN: CPT | Performed by: INTERNAL MEDICINE

## 2025-04-16 PROCEDURE — 25010000002 HEPARIN LOCK FLUSH PER 10 UNITS: Performed by: INTERNAL MEDICINE

## 2025-04-16 PROCEDURE — 85025 COMPLETE CBC W/AUTO DIFF WBC: CPT | Performed by: NURSE PRACTITIONER

## 2025-04-16 PROCEDURE — 36591 DRAW BLOOD OFF VENOUS DEVICE: CPT

## 2025-04-16 PROCEDURE — 80053 COMPREHEN METABOLIC PANEL: CPT | Performed by: NURSE PRACTITIONER

## 2025-04-16 RX ORDER — SODIUM CHLORIDE 0.9 % (FLUSH) 0.9 %
10 SYRINGE (ML) INJECTION AS NEEDED
OUTPATIENT
Start: 2025-04-16

## 2025-04-16 RX ORDER — HEPARIN SODIUM (PORCINE) LOCK FLUSH IV SOLN 100 UNIT/ML 100 UNIT/ML
500 SOLUTION INTRAVENOUS AS NEEDED
Status: DISCONTINUED | OUTPATIENT
Start: 2025-04-16 | End: 2025-04-17 | Stop reason: HOSPADM

## 2025-04-16 RX ORDER — HEPARIN SODIUM (PORCINE) LOCK FLUSH IV SOLN 100 UNIT/ML 100 UNIT/ML
500 SOLUTION INTRAVENOUS AS NEEDED
OUTPATIENT
Start: 2025-04-16

## 2025-04-16 RX ADMIN — HEPARIN 500 UNITS: 100 SYRINGE at 11:07

## 2025-04-16 NOTE — PROGRESS NOTES
"      PROBLEM LIST:  [id number] triple negative (her2 0+) invasive ductal carcinoma of the left breast  Biopsy of a 1.2 cm left breast mass on 10/4/23.  Pathology showed a high grade IDC.  Neoadjuvant chemotherapy with ddAC followed by taxol started 11/9/23  Bilateral mastectomy on 5/14/24.  Pathology showed a grade 2 IDC measuring 1.1 cm.  0/2 SLN involved.  There was evidence of of response in the invasive carcinoma, and possibly in the lymph nodes.  [id number]  PET/CT 6/11/24 showed a hypermetabolic left lobe liver lesion, several small foci of uptake in normal appearing mediastinal and hilar LN, subtle uptake left C2 pedicle.  Subsequent bone scan 6/19/24 showed no suggestion of bone metastases.  Liver biopsy 6/21/24 showed normal hepatic tissue.  Repeat imaging 7/18/24 showed persistent uptake in the liver.  Laparoscopic surgery 8/23/24 showed no visible abnormality in the liver.  Falciform ligament nodule was biopsies which was benign.  Adjuvant Xeloda started 9/8/2024.  Completed 8 cycles February 2025.    Subjective     CHIEF COMPLAINT: breast cancer    HISTORY OF PRESENT ILLNESS:   Swathi Cain returns for follow-up.  She has been feeling well.  Her energy level has improved since stopping chemo and her her hands and feet are feeling better as well.          Objective      /77   Pulse (!) 130   Temp 97.3 °F (36.3 °C)   Resp 18   Ht 165.1 cm (65\")   Wt 60.8 kg (134 lb)   SpO2 100%   BMI 22.30 kg/m²   Vitals:    04/16/25 1113   PainSc: 0-No pain                                 ECOG score: 0             General: well appearing female in no acute distress  Neuro: alert and oriented  HEENT: sclera anicteric  Extremeties: no lower extremity edema  Skin: no rashes, lesions, bruising, or petechiae.  Mild erythema on the palms/plantar surfaces   Psych: mood and affect appropriate            RECENT LABS:  Lab Results   Component Value Date    WBC 4.97 04/16/2025    HGB 14.1 04/16/2025    HCT 41.4 " 04/16/2025    .8 (H) 04/16/2025     04/16/2025       Lab Results   Component Value Date    GLUCOSE 97 03/05/2025    BUN 12 03/05/2025    CREATININE 0.76 03/05/2025    BCR 15.8 03/05/2025    K 4.0 03/05/2025    CO2 21.0 (L) 03/05/2025    CALCIUM 9.0 03/05/2025    ALBUMIN 4.2 03/05/2025    AST 26 03/05/2025    ALT 15 03/05/2025       CT Abdomen Pelvis With Contrast  Narrative: CT ABDOMEN PELVIS W CONTRAST, CT CHEST W CONTRAST DIAGNOSTIC    Date of Exam: 4/9/2025 11:03 AM EDT    Indication: followup  breast cancer, evaluation of treatment.    Comparison: January 9, 2025    Technique: Axial CT images were obtained of the chest abdomen and pelvis following the uneventful intravenous administration of 90 mL Isovue-300. Reconstructed coronal and sagittal images were also obtained. Automated exposure control and iterative   construction methods were used.    Findings:  Chest:    The central tracheobronchial tree is clear. The lungs are clear. No pulmonary nodule is identified. There is no pleural effusion.    A right internal jugular port is its tip at the upper SVC. The heart size appears normal. The great vessels are normal in caliber. No abnormally enlarged lymph nodes are identified.    No aggressive osseous lesions are identified.    Abdomen/pelvis:    A 1.5 cm hyperenhancing lesion within the left intrahepatic lobe on image 17 of series 2 is fairly isointense on delayed imaging, is stable in size and is concerning for a hypervascular metastasis. There is a stable known hemangioma in the right   posterior hepatic lobe. Tiny hypodensities within the more inferior right hepatic lobe are too small to characterize, and likely represent incidental cysts. There are several stones in the gallbladder. The right adrenal gland, kidneys, spleen, and   pancreas are unremarkable. There is a stable small left adrenal nodule, likely an adenoma.    The stomach appears normal. The small bowel appears normal in caliber and  configuration. The colon appears normal. The appendix appears normal. There is no ascites or loculated collection. No abnormally enlarged lymph nodes are identified.    The rectum, uterus, and urinary bladder are unremarkable.    No aggressive osseous lesions are identified.  Impression: Impression:  1.1.5 cm hyperenhancing lesion within left intrahepatic lobe is unchanged in size, concerning for a hypervascular metastasis.  2.No evidence of metastasis within chest.  3.Cholelithiasis.  4.Stable small left adrenal nodule, likely an adenoma.    Electronically Signed: Amado Diop MD    4/13/2025 1:00 PM EDT    Workstation ID: [id number]  CT Chest With Contrast Diagnostic  Narrative: CT ABDOMEN PELVIS W CONTRAST, CT CHEST W CONTRAST DIAGNOSTIC    Date of Exam: 4/9/2025 11:03 AM EDT    Indication: followup  breast cancer, evaluation of treatment.    Comparison: January 9, 2025    Technique: Axial CT images were obtained of the chest abdomen and pelvis following the uneventful intravenous administration of 90 mL Isovue-300. Reconstructed coronal and sagittal images were also obtained. Automated exposure control and iterative   construction methods were used.    Findings:  Chest:    The central tracheobronchial tree is clear. The lungs are clear. No pulmonary nodule is identified. There is no pleural effusion.    A right internal jugular port is its tip at the upper SVC. The heart size appears normal. The great vessels are normal in caliber. No abnormally enlarged lymph nodes are identified.    No aggressive osseous lesions are identified.    Abdomen/pelvis:    A 1.5 cm hyperenhancing lesion within the left intrahepatic lobe on image 17 of series 2 is fairly isointense on delayed imaging, is stable in size and is concerning for a hypervascular metastasis. There is a stable known hemangioma in the right   posterior hepatic lobe. Tiny hypodensities within the more inferior right hepatic lobe are too small to characterize,  and likely represent incidental cysts. There are several stones in the gallbladder. The right adrenal gland, kidneys, spleen, and   pancreas are unremarkable. There is a stable small left adrenal nodule, likely an adenoma.    The stomach appears normal. The small bowel appears normal in caliber and configuration. The colon appears normal. The appendix appears normal. There is no ascites or loculated collection. No abnormally enlarged lymph nodes are identified.    The rectum, uterus, and urinary bladder are unremarkable.    No aggressive osseous lesions are identified.  Impression: Impression:  1.1.5 cm hyperenhancing lesion within left intrahepatic lobe is unchanged in size, concerning for a hypervascular metastasis.  2.No evidence of metastasis within chest.  3.Cholelithiasis.  4.Stable small left adrenal nodule, likely an adenoma.    Electronically Signed: Amado Diop MD    4/13/2025 1:00 PM EDT    Workstation ID: [id number]        I personally reviewed the imaging studies    ASSESSMENT AND PLAN:     Swathi Cain is a 59 y.o. female with a T1 cN0 M0 triple negative invasive ductal carcinoma of the left breast.  PDL1 CPS < 1 on original resection.    She has completed 8 cycles of Xeloda 2/22/2025. Clinically she is doing well.  We reviewed her scans which show a stable hypervascular lesion in the liver, unchanged now for about 9 months.  I think this is unlikely to be malignant given stability but we will continue to monitor.  I will plan to repeat CT imaging in 3 months.    If her next scans are stable we can plan to remove her port at that time.    Follow-up in 3 months with labs and scans.  Port flush in 6 weeks                    Marga Arreola MD  [facility]ology [facility] On[facility]    4/16/2025          CC:

## 2025-05-27 ENCOUNTER — HOSPITAL ENCOUNTER (OUTPATIENT)
Dept: ONCOLOGY | Facility: HOSPITAL | Age: 60
Discharge: HOME | End: 2025-05-27
Admitting: INTERNAL MEDICINE
Payer: COMMERCIAL

## 2025-05-27 VITALS
BODY MASS INDEX: 22.16 KG/M2 | HEIGHT: 65 IN | DIASTOLIC BLOOD PRESSURE: 74 MMHG | SYSTOLIC BLOOD PRESSURE: 162 MMHG | WEIGHT: 133 LBS | RESPIRATION RATE: 16 BRPM | TEMPERATURE: 97.4 F | HEART RATE: 100 BPM

## 2025-05-27 DIAGNOSIS — C50.812 MALIGNANT NEOPLASM OF OVERLAPPING SITES OF LEFT BREAST IN FEMALE, ESTROGEN RECEPTOR NEGATIVE: Primary | ICD-10-CM

## 2025-05-27 DIAGNOSIS — Z17.1 MALIGNANT NEOPLASM OF OVERLAPPING SITES OF LEFT BREAST IN FEMALE, ESTROGEN RECEPTOR NEGATIVE: Primary | ICD-10-CM

## 2025-05-27 PROCEDURE — 96523 IRRIG DRUG DELIVERY DEVICE: CPT

## 2025-05-27 PROCEDURE — 25010000002 HEPARIN LOCK FLUSH PER 10 UNITS: Performed by: INTERNAL MEDICINE

## 2025-05-27 RX ORDER — SODIUM CHLORIDE 0.9 % (FLUSH) 0.9 %
10 SYRINGE (ML) INJECTION AS NEEDED
OUTPATIENT
Start: 2025-05-27

## 2025-05-27 RX ORDER — HEPARIN SODIUM (PORCINE) LOCK FLUSH IV SOLN 100 UNIT/ML 100 UNIT/ML
500 SOLUTION INTRAVENOUS AS NEEDED
Status: DISCONTINUED | OUTPATIENT
Start: 2025-05-27 | End: 2025-05-28 | Stop reason: HOSPADM

## 2025-05-27 RX ORDER — HEPARIN SODIUM (PORCINE) LOCK FLUSH IV SOLN 100 UNIT/ML 100 UNIT/ML
500 SOLUTION INTRAVENOUS AS NEEDED
OUTPATIENT
Start: 2025-05-27

## 2025-05-27 RX ADMIN — HEPARIN 500 UNITS: 100 SYRINGE at 11:06

## 2025-06-09 ENCOUNTER — TELEPHONE (OUTPATIENT)
Dept: ONCOLOGY | Facility: CLINIC | Age: 60
End: 2025-06-09

## 2025-06-09 NOTE — TELEPHONE ENCOUNTER
Caller: Swathi Cain    Relationship to patient: Self    Best call back number: [phone]    Chief complaint: CANC. -R/S     Type of visit: PORT FLUSH & F/U 1    Requested date: 7/8/25     If rescheduling, when is the original appointment: 7/11/25

## 2025-06-12 ENCOUNTER — TELEPHONE (OUTPATIENT)
Dept: ONCOLOGY | Facility: CLINIC | Age: 60
End: 2025-06-12
Payer: COMMERCIAL

## 2025-06-12 NOTE — TELEPHONE ENCOUNTER
Caller: Swathi Cain    Relationship to patient: Self    Best call back number: [phone]     Chief complaint: PATIENT TO RESCHEDULE 7/10/25 APPT    Type of visit: PORT FLUSH AND FU    Requested date: EARLIER THAT WEEK OR NEXT AVAILABLE

## 2025-06-30 ENCOUNTER — HOSPITAL ENCOUNTER (OUTPATIENT)
Dept: CT IMAGING | Facility: HOSPITAL | Age: 60
Discharge: HOME | End: 2025-06-30
Admitting: INTERNAL MEDICINE
Payer: COMMERCIAL

## 2025-06-30 DIAGNOSIS — C50.812 MALIGNANT NEOPLASM OF OVERLAPPING SITES OF LEFT BREAST IN FEMALE, ESTROGEN RECEPTOR NEGATIVE: ICD-10-CM

## 2025-06-30 DIAGNOSIS — Z17.1 MALIGNANT NEOPLASM OF OVERLAPPING SITES OF LEFT BREAST IN FEMALE, ESTROGEN RECEPTOR NEGATIVE: ICD-10-CM

## 2025-06-30 PROCEDURE — 25510000001 IOPAMIDOL 61 % SOLUTION: Performed by: INTERNAL MEDICINE

## 2025-06-30 PROCEDURE — 71260 CT THORAX DX C+: CPT

## 2025-06-30 PROCEDURE — 74178 CT ABD&PLV WO CNTR FLWD CNTR: CPT

## 2025-06-30 RX ORDER — IOPAMIDOL 612 MG/ML
65 INJECTION, SOLUTION INTRAVASCULAR
Status: COMPLETED | OUTPATIENT
Start: 2025-06-30 | End: 2025-06-30

## 2025-06-30 RX ADMIN — IOPAMIDOL 65 ML: 612 INJECTION, SOLUTION INTRAVENOUS at 12:09

## 2025-07-03 ENCOUNTER — OFFICE VISIT (OUTPATIENT)
Dept: ONCOLOGY | Facility: CLINIC | Age: 60
End: 2025-07-03
Payer: COMMERCIAL

## 2025-07-03 ENCOUNTER — HOSPITAL ENCOUNTER (OUTPATIENT)
Dept: ONCOLOGY | Facility: HOSPITAL | Age: 60
Discharge: HOME | End: 2025-07-03
Admitting: INTERNAL MEDICINE
Payer: COMMERCIAL

## 2025-07-03 VITALS
HEIGHT: 65 IN | OXYGEN SATURATION: 99 % | BODY MASS INDEX: 22.01 KG/M2 | DIASTOLIC BLOOD PRESSURE: 82 MMHG | WEIGHT: 132.1 LBS | TEMPERATURE: 97.1 F | RESPIRATION RATE: 18 BRPM | HEART RATE: 131 BPM | SYSTOLIC BLOOD PRESSURE: 154 MMHG

## 2025-07-03 DIAGNOSIS — C50.812 MALIGNANT NEOPLASM OF OVERLAPPING SITES OF LEFT BREAST IN FEMALE, ESTROGEN RECEPTOR NEGATIVE: Primary | ICD-10-CM

## 2025-07-03 DIAGNOSIS — Z17.1 MALIGNANT NEOPLASM OF OVERLAPPING SITES OF LEFT BREAST IN FEMALE, ESTROGEN RECEPTOR NEGATIVE: Primary | ICD-10-CM

## 2025-07-03 LAB
ALBUMIN SERPL-MCNC: 4.6 G/DL (ref 3.5–5.2)
ALBUMIN/GLOB SERPL: 1.8 G/DL
ALP SERPL-CCNC: 98 U/L (ref 39–117)
ALT SERPL W P-5'-P-CCNC: 16 U/L (ref 1–33)
ANION GAP SERPL CALCULATED.3IONS-SCNC: 13.3 MMOL/L (ref 5–15)
AST SERPL-CCNC: 23 U/L (ref 1–32)
BASOPHILS # BLD AUTO: 0.01 10*3/MM3 (ref 0–0.2)
BASOPHILS NFR BLD AUTO: 0.2 % (ref 0–1.5)
BILIRUB SERPL-MCNC: 0.4 MG/DL (ref 0–1.2)
BUN SERPL-MCNC: 9.1 MG/DL (ref 6–20)
BUN/CREAT SERPL: 14.2 (ref 7–25)
CALCIUM SPEC-SCNC: 9.6 MG/DL (ref 8.6–10.5)
CHLORIDE SERPL-SCNC: 104 MMOL/L (ref 98–107)
CO2 SERPL-SCNC: 21.7 MMOL/L (ref 22–29)
CREAT SERPL-MCNC: 0.64 MG/DL (ref 0.57–1)
DEPRECATED RDW RBC AUTO: 48.3 FL (ref 37–54)
EGFRCR SERPLBLD CKD-EPI 2021: 103.2 ML/MIN/1.73
EOSINOPHIL # BLD AUTO: 0.02 10*3/MM3 (ref 0–0.4)
EOSINOPHIL NFR BLD AUTO: 0.4 % (ref 0.3–6.2)
ERYTHROCYTE [DISTWIDTH] IN BLOOD BY AUTOMATED COUNT: 12.5 % (ref 12.3–15.4)
GLOBULIN UR ELPH-MCNC: 2.6 GM/DL
GLUCOSE SERPL-MCNC: 113 MG/DL (ref 65–99)
HCT VFR BLD AUTO: 43.7 % (ref 34–46.6)
HGB BLD-MCNC: 14.6 G/DL (ref 12–15.9)
IMM GRANULOCYTES # BLD AUTO: 0.01 10*3/MM3 (ref 0–0.05)
IMM GRANULOCYTES NFR BLD AUTO: 0.2 % (ref 0–0.5)
LYMPHOCYTES # BLD AUTO: 0.97 10*3/MM3 (ref 0.7–3.1)
LYMPHOCYTES NFR BLD AUTO: 18.7 % (ref 19.6–45.3)
MCH RBC QN AUTO: 34.4 PG (ref 26.6–33)
MCHC RBC AUTO-ENTMCNC: 33.4 G/DL (ref 31.5–35.7)
MCV RBC AUTO: 102.8 FL (ref 79–97)
MONOCYTES # BLD AUTO: 0.33 10*3/MM3 (ref 0.1–0.9)
MONOCYTES NFR BLD AUTO: 6.3 % (ref 5–12)
NEUTROPHILS NFR BLD AUTO: 3.86 10*3/MM3 (ref 1.7–7)
NEUTROPHILS NFR BLD AUTO: 74.2 % (ref 42.7–76)
PLATELET # BLD AUTO: 150 10*3/MM3 (ref 140–450)
PMV BLD AUTO: 8.9 FL (ref 6–12)
POTASSIUM SERPL-SCNC: 3.8 MMOL/L (ref 3.5–5.2)
PROT SERPL-MCNC: 7.2 G/DL (ref 6–8.5)
RBC # BLD AUTO: 4.25 10*6/MM3 (ref 3.77–5.28)
SODIUM SERPL-SCNC: 139 MMOL/L (ref 136–145)
WBC NRBC COR # BLD AUTO: 5.2 10*3/MM3 (ref 3.4–10.8)

## 2025-07-03 PROCEDURE — 85025 COMPLETE CBC W/AUTO DIFF WBC: CPT | Performed by: INTERNAL MEDICINE

## 2025-07-03 PROCEDURE — 25010000002 HEPARIN LOCK FLUSH PER 10 UNITS: Performed by: INTERNAL MEDICINE

## 2025-07-03 PROCEDURE — 80053 COMPREHEN METABOLIC PANEL: CPT | Performed by: INTERNAL MEDICINE

## 2025-07-03 PROCEDURE — 36591 DRAW BLOOD OFF VENOUS DEVICE: CPT

## 2025-07-03 RX ORDER — SODIUM CHLORIDE 0.9 % (FLUSH) 0.9 %
10 SYRINGE (ML) INJECTION AS NEEDED
OUTPATIENT
Start: 2025-07-03

## 2025-07-03 RX ORDER — HEPARIN SODIUM (PORCINE) LOCK FLUSH IV SOLN 100 UNIT/ML 100 UNIT/ML
500 SOLUTION INTRAVENOUS AS NEEDED
Status: DISCONTINUED | OUTPATIENT
Start: 2025-07-03 | End: 2025-07-04 | Stop reason: HOSPADM

## 2025-07-03 RX ORDER — HEPARIN SODIUM (PORCINE) LOCK FLUSH IV SOLN 100 UNIT/ML 100 UNIT/ML
500 SOLUTION INTRAVENOUS AS NEEDED
OUTPATIENT
Start: 2025-07-03

## 2025-07-03 RX ADMIN — HEPARIN 500 UNITS: 100 SYRINGE at 11:47

## 2025-07-03 NOTE — PROGRESS NOTES
"      PROBLEM LIST:  [id number] triple negative (her2 0+) invasive ductal carcinoma of the left breast  Biopsy of a 1.2 cm left breast mass on 10/4/23.  Pathology showed a high grade IDC.  Neoadjuvant chemotherapy with ddAC followed by taxol started 11/9/23  Bilateral mastectomy on 5/14/24.  Pathology showed a grade 2 IDC measuring 1.1 cm.  0/2 SLN involved.  There was evidence of of response in the invasive carcinoma, and possibly in the lymph nodes.  [id number]  PET/CT 6/11/24 showed a hypermetabolic left lobe liver lesion, several small foci of uptake in normal appearing mediastinal and hilar LN, subtle uptake left C2 pedicle.  Subsequent bone scan 6/19/24 showed no suggestion of bone metastases.  Liver biopsy 6/21/24 showed normal hepatic tissue.  Repeat imaging 7/18/24 showed persistent uptake in the liver.  Laparoscopic surgery 8/23/24 showed no visible abnormality in the liver.  Falciform ligament nodule was biopsies which was benign.  Adjuvant Xeloda started 9/8/2024.  Completed 8 cycles February 2025.    Subjective     CHIEF COMPLAINT: breast cancer    HISTORY OF PRESENT ILLNESS:   Swathi Cain returns for follow-up.  He is feeling well.  No new symptoms or complaints.          Objective      /82   Pulse (!) 131   Temp 97.1 °F (36.2 °C) (Temporal)   Resp 18   Ht 165.1 cm (65\")   Wt 59.9 kg (132 lb 1.6 oz)   SpO2 99%   BMI 21.98 kg/m²   Vitals:    07/03/25 1014   PainSc: 0-No pain                                 ECOG score: 0             General: well appearing female in no acute distress  Neuro: alert and oriented  HEENT: sclera anicteric  Extremeties: no lower extremity edema  Skin: no rashes, lesions, bruising, or petechiae.    Psych: mood and affect appropriate            RECENT LABS:  Lab Results   Component Value Date    WBC 4.97 04/16/2025    HGB 14.1 04/16/2025    HCT 41.4 04/16/2025    .8 (H) 04/16/2025     04/16/2025       Lab Results   Component Value Date    GLUCOSE " 116 (H) 04/16/2025    BUN 12 04/16/2025    CREATININE 0.77 04/16/2025    BCR 15.6 04/16/2025    K 4.0 04/16/2025    CO2 22.0 04/16/2025    CALCIUM 9.9 04/16/2025    ALBUMIN 4.5 04/16/2025    AST 22 04/16/2025    ALT 12 04/16/2025       CT Abdomen Pelvis With & Without Contrast  Narrative: CT ABDOMEN PELVIS W WO CONTRAST, CT CHEST W CONTRAST DIAGNOSTIC    Date of Exam: 6/30/2025 12:01 PM EDT    Indication: breast cancer.    Comparison: 4/9/2025. 10/16/2024, PET/CT 7/18/2024    Technique: Noncontrast CT images of the abdomen and pelvis. Axial CT images were obtained of the chest, abdomen and pelvis following the uneventful intravenous administration of 85 cc Isovue-300 as well as additional delayed phase imaging of the abdomen   and pelvis.. Sagittal and coronal reconstructions were performed.  Automated exposure control and iterative reconstruction methods were used.    Findings:    Right-sided MediPort. Cardiac size is within normal limits. No thoracic aortic aneurysm or dissection. No pulmonary emboli.  Visualized thyroid gland is unremarkable.     No significant mediastinal, hilar or axillary lymphadenopathy. No significant pleural or pericardial fluid. Trace fluid in the superior pericardial recesses.     Central airways are patent. No pneumothorax. Lungs are clear.    The liver is normal in size, morphology and attenuation. Hypodense lesion of the right hepatic lobe measures 1.5 x 1.6 cm, smaller than before, previously characterized as hemangioma.. Enhancing subcapsular lesion of the left hepatic lobe measures 2 x   2.2 cm, previously 1.4 x 1.7 cm, concerning for metastatic hypervascular metastatic lesion. This lesion demonstrated hypermetabolic activity on PET/CT July 2024. Cholelithiasis without findings of acute cholecystitis.. There is no significant biliary   ductal dilatation.    The pancreas, spleen and right adrenal gland are within normal limits. Unchanged 13 mm left adrenal nodule.    The kidneys  enhance symmetrically. No hydronephrosis. No focal renal lesions.    No significant mesenteric or retroperitoneal lymphadenopathy. No ascites. Abdominal aorta is normal in caliber.    GI tract within the abdomen appears grossly unremarkable.    The uterus is anteverted.. The bladder is within normal limits. The rectum is grossly unremarkable. No significant pelvic or inguinal lymphadenopathy.No significant free fluid.    No aggressive osseous lesions are identified. Post bilateral mastectomy.  Impression: Impression:    1. Enlarging hypervascular lesion in the subcapsular left hepatic lobe concerning for metastatic focus.    2.. No evidence of metastasis within the chest.    3. Cholelithiasis.    Electronically Signed: Benita Conner MD    7/2/2025 11:04 AM EDT    Workstation ID: [id number]  CT Chest With Contrast Diagnostic  Narrative: CT ABDOMEN PELVIS W WO CONTRAST, CT CHEST W CONTRAST DIAGNOSTIC    Date of Exam: 6/30/2025 12:01 PM EDT    Indication: breast cancer.    Comparison: 4/9/2025. 10/16/2024, PET/CT 7/18/2024    Technique: Noncontrast CT images of the abdomen and pelvis. Axial CT images were obtained of the chest, abdomen and pelvis following the uneventful intravenous administration of 85 cc Isovue-300 as well as additional delayed phase imaging of the abdomen   and pelvis.. Sagittal and coronal reconstructions were performed.  Automated exposure control and iterative reconstruction methods were used.    Findings:    Right-sided MediPort. Cardiac size is within normal limits. No thoracic aortic aneurysm or dissection. No pulmonary emboli.  Visualized thyroid gland is unremarkable.     No significant mediastinal, hilar or axillary lymphadenopathy. No significant pleural or pericardial fluid. Trace fluid in the superior pericardial recesses.     Central airways are patent. No pneumothorax. Lungs are clear.    The liver is normal in size, morphology and attenuation. Hypodense lesion of the right hepatic  lobe measures 1.5 x 1.6 cm, smaller than before, previously characterized as hemangioma.. Enhancing subcapsular lesion of the left hepatic lobe measures 2 x   2.2 cm, previously 1.4 x 1.7 cm, concerning for metastatic hypervascular metastatic lesion. This lesion demonstrated hypermetabolic activity on PET/CT July 2024. Cholelithiasis without findings of acute cholecystitis.. There is no significant biliary   ductal dilatation.    The pancreas, spleen and right adrenal gland are within normal limits. Unchanged 13 mm left adrenal nodule.    The kidneys enhance symmetrically. No hydronephrosis. No focal renal lesions.    No significant mesenteric or retroperitoneal lymphadenopathy. No ascites. Abdominal aorta is normal in caliber.    GI tract within the abdomen appears grossly unremarkable.    The uterus is anteverted.. The bladder is within normal limits. The rectum is grossly unremarkable. No significant pelvic or inguinal lymphadenopathy.No significant free fluid.    No aggressive osseous lesions are identified. Post bilateral mastectomy.  Impression: Impression:    1. Enlarging hypervascular lesion in the subcapsular left hepatic lobe concerning for metastatic focus.    2.. No evidence of metastasis within the chest.    3. Cholelithiasis.    Electronically Signed: Benita Conner MD    7/2/2025 11:04 AM EDT    Workstation ID: [id number]        I personally reviewed the imaging studies and discussed with radiology.    ASSESSMENT AND PLAN:     Swathi Cain is a 59 y.o. female with a T1 cN0 M0 triple negative invasive ductal carcinoma of the left breast.  PDL1 CPS < 1 on original resection.    She has completed 8 cycles of Xeloda 2/22/2025. Clinically she is doing well.  We reviewed her imaging studies.  She again has a hypervascular lesion in the liver.  This has now been present on imaging for over a year.  I discussed her scans with 2 different radiologists.  They are fairly confident that this is a vascular  lesion, potentially a vascular shunt.  The change in size is likely dependent on the phase of contrast administration.  I will plan to continue scans every 6 months for the next couple years just to follow-up on this but I am reassured that this is not malignant.    We will go ahead and remove her port.    Follow-up in 3 months with labs.  We will repeat scans with CT with IV contrast liver protocol chest abdomen pelvis in 6 months.    Total time of patient care on day of service including time prior to, face to face with patient, and following visit spent in reviewing records, lab results, imaging studies, discussion with patient, discussion with other providers, and documentation/charting was > 50 minutes.                      Marga Arreola MD  [facility] Hematology and Oncology    7/3/2025          CC:

## 2025-07-28 DIAGNOSIS — Z17.1 MALIGNANT NEOPLASM OF OVERLAPPING SITES OF LEFT BREAST IN FEMALE, ESTROGEN RECEPTOR NEGATIVE: Primary | ICD-10-CM

## 2025-07-28 DIAGNOSIS — C50.812 MALIGNANT NEOPLASM OF OVERLAPPING SITES OF LEFT BREAST IN FEMALE, ESTROGEN RECEPTOR NEGATIVE: Primary | ICD-10-CM

## 2025-08-14 ENCOUNTER — HOSPITAL ENCOUNTER (OUTPATIENT)
Dept: ONCOLOGY | Facility: HOSPITAL | Age: 60
Discharge: HOME | End: 2025-08-14
Payer: COMMERCIAL

## (undated) DEVICE — CVR HNDL LIGHT RIGID

## (undated) DEVICE — HDRST POSTIN FM CRDL TRACH SLOT NONCOMRESS 9X8X4IN

## (undated) DEVICE — SUT SILK 3/0 TIES 18IN A184H

## (undated) DEVICE — ENDOPATH PNEUMONEEDLE INSUFFLATION NEEDLES WITH LUER LOCK CONNECTORS 120MM: Brand: ENDOPATH

## (undated) DEVICE — ENDOPATH XCEL UNIVERSAL TROCAR STABLILITY SLEEVES: Brand: ENDOPATH XCEL

## (undated) DEVICE — APPL CHLORAPREP TINTED 26ML TEAL

## (undated) DEVICE — BLANKT WARM UPPR/BDY ARM/OUT 57X196CM

## (undated) DEVICE — JACKSON-PRATT 100CC BULB RESERVOIR: Brand: CARDINAL HEALTH

## (undated) DEVICE — SUT SILK 2/0 PS 18IN 1588H

## (undated) DEVICE — SHEATH GUIDE SCOUT SURG TPR 8.3TO3.2CM 264CM STRL

## (undated) DEVICE — MEDI-VAC YANKAUER SUCTION HANDLE W/BULBOUS TIP: Brand: CARDINAL HEALTH

## (undated) DEVICE — GLV SURG SENSICARE PI ORTHO SZ7.5 LF STRL

## (undated) DEVICE — SUT MNCRYL PLS ANTIB UD 4/0 PS2 18IN

## (undated) DEVICE — LAPAROVUE VISIBILITY SYSTEM LAPAROSCOPIC SOLUTIONS: Brand: LAPAROVUE

## (undated) DEVICE — PK LAP LASR CHOLE 10

## (undated) DEVICE — ANTIBACTERIAL UNDYED BRAIDED (POLYGLACTIN 910), SYNTHETIC ABSORBABLE SUTURE: Brand: COATED VICRYL

## (undated) DEVICE — ELECTRD BLD EZ CLN MOD XLNG 2.75IN

## (undated) DEVICE — LAPAROSCOPIC SCISSORS: Brand: EPIX LAPAROSCOPIC SCISSORS

## (undated) DEVICE — DISH PETRI 3.5IN MD STRL LF

## (undated) DEVICE — [HIGH FLOW INSUFFLATOR,  DO NOT USE IF PACKAGE IS DAMAGED,  KEEP DRY,  KEEP AWAY FROM SUNLIGHT,  PROTECT FROM HEAT AND RADIOACTIVE SOURCES.]: Brand: PNEUMOSURE

## (undated) DEVICE — DRAIN JACKSON PRATT ROUND 15FR: Brand: CARDINAL HEALTH

## (undated) DEVICE — INTENDED FOR TISSUE SEPARATION, AND OTHER PROCEDURES THAT REQUIRE A SHARP SURGICAL BLADE TO PUNCTURE OR CUT.: Brand: BARD-PARKER ® STAINLESS STEEL BLADES

## (undated) DEVICE — UNDRGLV SURG BIOGEL PUNCTUREINDICATION SZ7 PF STRL

## (undated) DEVICE — DRAPE,UTILITY,TAPE,15X26,STERILE: Brand: MEDLINE

## (undated) DEVICE — SYS CLS SKIN PREMIERPRO EXOFINFUSION 22CM

## (undated) DEVICE — GOWN,NON-REINFORCED,SIRUS,SET IN SLV,XL: Brand: MEDLINE

## (undated) DEVICE — BNDG,ELSTC,MATRIX,STRL,6"X5YD,LF,HOOK&LP: Brand: MEDLINE

## (undated) DEVICE — SUT VIC 0 UR6 27IN VCP603H

## (undated) DEVICE — DRSNG PAD ABD 8X10IN STRL

## (undated) DEVICE — LEX GENERAL BREAST: Brand: MEDLINE INDUSTRIES, INC.

## (undated) DEVICE — GLV SURG BIOGEL LTX PF 7

## (undated) DEVICE — TRAP FLD MINIVAC MEGADYNE 100ML

## (undated) DEVICE — MARYLAND JAW LAPAROSCOPIC SEALER/DIVIDER COATED: Brand: LIGASURE

## (undated) DEVICE — TUBING, SUCTION, 1/4" X 10', STRAIGHT: Brand: MEDLINE

## (undated) DEVICE — MAX-CORE® DISPOSABLE CORE BIOPSY INSTRUMENT, 18G X 25CM: Brand: MAX-CORE

## (undated) DEVICE — 3M™ IOBAN™ 2 ANTIMICROBIAL INCISE DRAPE 6650EZ: Brand: IOBAN™ 2